# Patient Record
Sex: MALE | Race: WHITE | Employment: FULL TIME | URBAN - METROPOLITAN AREA
[De-identification: names, ages, dates, MRNs, and addresses within clinical notes are randomized per-mention and may not be internally consistent; named-entity substitution may affect disease eponyms.]

---

## 2018-09-27 ENCOUNTER — OFFICE VISIT (OUTPATIENT)
Dept: INTERNAL MEDICINE | Age: 58
End: 2018-09-27
Payer: COMMERCIAL

## 2018-09-27 VITALS
TEMPERATURE: 98 F | SYSTOLIC BLOOD PRESSURE: 128 MMHG | HEIGHT: 74 IN | HEART RATE: 70 BPM | OXYGEN SATURATION: 98 % | BODY MASS INDEX: 32.6 KG/M2 | RESPIRATION RATE: 18 BRPM | WEIGHT: 254 LBS | DIASTOLIC BLOOD PRESSURE: 70 MMHG

## 2018-09-27 DIAGNOSIS — M06.9 RHEUMATOID ARTHRITIS INVOLVING MULTIPLE SITES, UNSPECIFIED RHEUMATOID FACTOR PRESENCE: Primary | ICD-10-CM

## 2018-09-27 PROCEDURE — 96372 THER/PROPH/DIAG INJ SC/IM: CPT | Performed by: NURSE PRACTITIONER

## 2018-09-27 PROCEDURE — 99213 OFFICE O/P EST LOW 20 MIN: CPT | Performed by: NURSE PRACTITIONER

## 2018-09-27 RX ORDER — METHYLPREDNISOLONE 4 MG/1
TABLET ORAL
Qty: 1 KIT | Refills: 0 | Status: SHIPPED | OUTPATIENT
Start: 2018-09-27 | End: 2018-10-03

## 2018-09-27 RX ORDER — KETOROLAC TROMETHAMINE 30 MG/ML
60 INJECTION, SOLUTION INTRAMUSCULAR; INTRAVENOUS ONCE
Status: COMPLETED | OUTPATIENT
Start: 2018-09-27 | End: 2018-09-27

## 2018-09-27 RX ORDER — CYCLOBENZAPRINE HCL 10 MG
10 TABLET ORAL 3 TIMES DAILY PRN
Qty: 30 TABLET | Refills: 1 | Status: SHIPPED | OUTPATIENT
Start: 2018-09-27 | End: 2018-10-07

## 2018-09-27 RX ADMIN — KETOROLAC TROMETHAMINE 60 MG: 30 INJECTION, SOLUTION INTRAMUSCULAR; INTRAVENOUS at 15:11

## 2018-09-27 ASSESSMENT — PATIENT HEALTH QUESTIONNAIRE - PHQ9
SUM OF ALL RESPONSES TO PHQ9 QUESTIONS 1 & 2: 0
SUM OF ALL RESPONSES TO PHQ QUESTIONS 1-9: 0
SUM OF ALL RESPONSES TO PHQ QUESTIONS 1-9: 0
1. LITTLE INTEREST OR PLEASURE IN DOING THINGS: 0
2. FEELING DOWN, DEPRESSED OR HOPELESS: 0

## 2018-09-27 ASSESSMENT — ENCOUNTER SYMPTOMS
COLOR CHANGE: 0
SHORTNESS OF BREATH: 0
TROUBLE SWALLOWING: 0
ABDOMINAL PAIN: 0
VOICE CHANGE: 0
BACK PAIN: 1
CHEST TIGHTNESS: 0

## 2018-09-27 NOTE — PATIENT INSTRUCTIONS
Patient Education   Begin taking the Medrol dose pack tomorrow. Establish with primary care provider. Rheumatoid Arthritis: Care Instructions  Your Care Instructions    Arthritis is a common health problem in which the joints are inflamed. There are many types of arthritis. In rheumatoid arthritis, the body's own immune system attacks the joints. This causes pain, stiffness, and swelling in the joints, especially in the hands and feet. It can become hard to open jars, write, and do other daily tasks. Sometimes rheumatoid arthritis can also cause bumps to form under the skin. Over time, rheumatoid arthritis can damage and deform joints. Early treatment with medicines may reduce your chances of having a lasting disability. Follow-up care is a key part of your treatment and safety. Be sure to make and go to all appointments, and call your doctor if you are having problems. It's also a good idea to know your test results and keep a list of the medicines you take. How can you care for yourself at home? · If your doctor recommends it, get more exercise. Walking is a good choice. If your knees or ankles hurt, try riding a stationary bike or swimming. · Move each joint gently through its full range of motion once or twice a day. · Rest joints when they are sore or overworked. Short rest breaks may help more than staying in bed. · Reach and stay at a healthy weight. Regular exercise and a healthy diet will help you do this. Extra weight can strain the joints, especially the knees and hips, and make the pain worse. Losing even a few pounds may help. · Get enough calcium and vitamin D to help prevent osteoporosis, which causes thin bones. Talk to your doctor about how much you should take. · Protect your joints from injury. Do not overuse them. Try to limit or avoid activities that cause joint pain or swelling.  Use special kitchen tools and other self-help devices as well as walkers, splints, or canes if needed. · Use heat to ease pain. Take warm showers or baths. Use hot packs or a heating pad set on low. Sleep under a warm electric blanket. · Put ice or a cold pack on the area for 10 to 20 minutes at a time. Put a thin cloth between the ice and your skin. · Take pain medicines exactly as directed. ¨ If the doctor gave you a prescription medicine for pain, take it as prescribed. ¨ If you are not taking a prescription pain medicine, ask your doctor if you can take an over-the-counter medicine. · Take an active role in managing your condition. Set up a treatment plan with your doctor, and learn as much as you can about rheumatoid arthritis. This will help you control pain and stay active. When should you call for help? Call your doctor now or seek immediate medical care if:    · You have a fever or a rash along with joint pain.     · You have joint pain that is so severe that you cannot use the joint at all.     · You have sudden swelling, redness, or pain in one or more joints, and you do not know why.     · You have back or neck pain along with weakness in your arms or legs.     · You have a loss of bowel or bladder control.    Watch closely for changes in your health, and be sure to contact your doctor if:    · You have joint pain that lasts for more than 6 weeks.     · You have side effects from your arthritis medicines, such as stomach pain, nausea, heartburn, or dark and tarlike stools. Where can you learn more? Go to https://KILTR.Scurri. org and sign in to your Sparktrend account. Enter K205 in the [facility] box to learn more about \"Rheumatoid Arthritis: Care Instructions. \"     If you do not have an account, please click on the \"Sign Up Now\" link. Current as of: October 10, 2017  Content Version: 11.7  © 6177-7942 [employer]. Care instructions adapted under license by [address]  If you have questions about a medical condition or this instruction, always history of stomach ulcers, you may want to avoid taking this medicine and an NSAID at the same time. This can cause stomach upset or bleeding. · Follow your doctor's instructions for how to stop taking this medicine. You may need to taper it. This means the medicine should be slowly reduced. Do not stop taking the medicine all at once. When should you call for help? Call 911 if:    · You vomit blood or what looks like coffee grounds.    Call your doctor now or seek immediate medical care if:    · Your symptoms are getting worse.     · You are dizzy or lightheaded, or you feel like you may faint.     · You have new or worse nausea or vomiting.     · You have stomach pain that is getting worse.     · Your stools are black.    Watch closely for changes in your health, and be sure to contact your doctor if:    · You do not get better as expected. Where can you learn more? Go to https://Tellus TechnologypeeGood.DockPHP. org and sign in to your [employer] account. Enter M555 in the [employer] box to learn more about \"Oral Corticosteroids: Care Instructions. \"     If you do not have an account, please click on the \"Sign Up Now\" link. Current as of: December 6, 2017  Content Version: 11.7  © 8336-3692 [employer]. Care instructions adapted under license by [facility] ([facility]). If you have questions about a medical condition or this instruction, always ask your healthcare professional. [place] [address] any warranty or liability for your use of this information.

## 2018-09-27 NOTE — PROGRESS NOTES
Resp: 18   Temp: 98 °F (36.7 °C)   TempSrc: Temporal   SpO2: 98%   Weight: 254 lb (115.2 kg)   Height: 6' 2\" (1.88 m)       Physical Exam   Constitutional: He appears well-developed and well-nourished. No distress. HENT:   Head: Normocephalic and atraumatic. Right Ear: External ear normal.   Left Ear: External ear normal.   Eyes: Pupils are equal, round, and reactive to light. Conjunctivae are normal.   Neck: Spinous process tenderness present. Pulmonary/Chest: Effort normal and breath sounds normal. No respiratory distress. Musculoskeletal:        Right shoulder: He exhibits decreased range of motion and tenderness. He exhibits no swelling, no effusion, no crepitus and no spasm. Left shoulder: He exhibits decreased range of motion and tenderness. He exhibits no swelling, no effusion, no crepitus and no spasm. Right knee: He exhibits normal range of motion, no swelling and no effusion. Tenderness found. Left knee: He exhibits normal range of motion, no swelling and no effusion. Tenderness found. Lumbar back: He exhibits tenderness and bony tenderness. He exhibits no swelling, no edema and no spasm. Neurological: He is alert. Gait (stiffness in gait, not favoring either side) abnormal.   Skin: Skin is warm and dry. He is not diaphoretic. Psychiatric: He has a normal mood and affect. His behavior is normal. Judgment and thought content normal.   Vitals reviewed. POC Testing Today: No results found for this visit on 09/27/18. Assessment & Plan    Diagnosis Orders   1. Rheumatoid arthritis involving multiple sites, unspecified rheumatoid factor presence (HCC)  ketorolac (TORADOL) injection 60 mg    methylPREDNISolone (MEDROL, DENIS,) 4 MG tablet    cyclobenzaprine (FLEXERIL) 10 MG tablet       No orders of the defined types were placed in this encounter. Pt to begin medrol dose pack tomorrow. Pt verbalized understanding.       Pt recommended to see PCP within the next few

## 2018-10-01 ENCOUNTER — TELEPHONE (OUTPATIENT)
Dept: INTERNAL MEDICINE | Age: 58
End: 2018-10-01

## 2018-10-01 DIAGNOSIS — M17.9 OSTEOARTHRITIS OF KNEE, UNSPECIFIED LATERALITY, UNSPECIFIED OSTEOARTHRITIS TYPE: Primary | ICD-10-CM

## 2018-10-05 ENCOUNTER — TELEPHONE (OUTPATIENT)
Dept: INTERNAL MEDICINE | Age: 58
End: 2018-10-05

## 2018-10-05 ENCOUNTER — OFFICE VISIT (OUTPATIENT)
Dept: INTERNAL MEDICINE | Age: 58
End: 2018-10-05
Payer: COMMERCIAL

## 2018-10-05 VITALS
DIASTOLIC BLOOD PRESSURE: 80 MMHG | HEART RATE: 90 BPM | WEIGHT: 239.4 LBS | BODY MASS INDEX: 29.77 KG/M2 | HEIGHT: 75 IN | SYSTOLIC BLOOD PRESSURE: 130 MMHG | RESPIRATION RATE: 20 BRPM

## 2018-10-05 DIAGNOSIS — M79.7 FIBROMYALGIA: ICD-10-CM

## 2018-10-05 DIAGNOSIS — R52 GENERALIZED PAIN: ICD-10-CM

## 2018-10-05 DIAGNOSIS — R52 PAIN: Primary | ICD-10-CM

## 2018-10-05 PROCEDURE — 96372 THER/PROPH/DIAG INJ SC/IM: CPT | Performed by: NURSE PRACTITIONER

## 2018-10-05 PROCEDURE — 99213 OFFICE O/P EST LOW 20 MIN: CPT | Performed by: NURSE PRACTITIONER

## 2018-10-05 RX ORDER — KETOROLAC TROMETHAMINE 30 MG/ML
30 INJECTION, SOLUTION INTRAMUSCULAR; INTRAVENOUS ONCE
Status: COMPLETED | OUTPATIENT
Start: 2018-10-05 | End: 2018-10-05

## 2018-10-05 RX ORDER — KETOROLAC TROMETHAMINE 30 MG/ML
30 INJECTION, SOLUTION INTRAMUSCULAR; INTRAVENOUS ONCE
Qty: 1 ML | Refills: 0
Start: 2018-10-05 | End: 2018-10-05 | Stop reason: CLARIF

## 2018-10-05 RX ORDER — PREGABALIN 75 MG/1
75 CAPSULE ORAL 2 TIMES DAILY
Qty: 14 CAPSULE | Refills: 0 | Status: SHIPPED | OUTPATIENT
Start: 2018-10-05 | End: 2019-05-12

## 2018-10-05 RX ADMIN — KETOROLAC TROMETHAMINE 30 MG: 30 INJECTION, SOLUTION INTRAMUSCULAR; INTRAVENOUS at 18:01

## 2018-10-05 ASSESSMENT — ENCOUNTER SYMPTOMS
GASTROINTESTINAL NEGATIVE: 1
RESPIRATORY NEGATIVE: 1

## 2018-10-05 NOTE — PROGRESS NOTES
Orders Placed This Encounter   Procedures    EMIR     Standing Status:   Future     Standing Expiration Date:   10/5/2019    High sensitivity CRP     Standing Status:   Future     Standing Expiration Date:   10/5/2019    Rheumatoid Factor     Standing Status:   Future     Standing Expiration Date:   44/1/3901    Cyclic Citrul Peptide Antibody, IgG     Standing Status:   Future     Standing Expiration Date:   10/5/2019    Sedimentation Rate     Standing Status:   Future     Standing Expiration Date:   10/5/2019    CBC With Auto Differential     Standing Status:   Future     Standing Expiration Date:   10/5/2019    Referral To Unknown External Neurology     Referral Priority:   Routine     Referral Type:   Eval and Treat     Referral Reason:   Specialty Services Required     Requested Specialty:   Neurology     Number of Visits Requested:   1    Amb External Referral To Pain Clinic     Referral Priority:   Routine     Referral Type:   Consult for Advice and Opinion     Requested Specialty:   Pain Management     Number of Visits Requested:   1     Orders Placed This Encounter   Medications    DISCONTD: ketorolac (TORADOL) 30 MG/ML injection     Sig: Inject 1 mL into the muscle once for 1 dose     Dispense:  1 mL     Refill:  0    pregabalin (LYRICA) 75 MG capsule     Sig: Take 1 capsule by mouth 2 times daily for 7 days. .     Dispense:  14 capsule     Refill:  0    ketorolac (TORADOL) injection 30 mg       Return with pcp.       Leatha Schmidt, APRN - CNP

## 2018-10-06 ENCOUNTER — TELEPHONE (OUTPATIENT)
Dept: INTERNAL MEDICINE | Age: 58
End: 2018-10-06

## 2018-10-08 ENCOUNTER — OFFICE VISIT (OUTPATIENT)
Dept: INTERNAL MEDICINE | Age: 58
End: 2018-10-08
Payer: COMMERCIAL

## 2018-10-08 ENCOUNTER — HOSPITAL ENCOUNTER (OUTPATIENT)
Age: 58
Setting detail: SPECIMEN
Discharge: HOME | End: 2018-10-08
Payer: COMMERCIAL

## 2018-10-08 ENCOUNTER — TELEPHONE (OUTPATIENT)
Dept: INTERNAL MEDICINE | Age: 58
End: 2018-10-08

## 2018-10-08 VITALS
OXYGEN SATURATION: 98 % | TEMPERATURE: 97.5 F | SYSTOLIC BLOOD PRESSURE: 100 MMHG | BODY MASS INDEX: 29.12 KG/M2 | DIASTOLIC BLOOD PRESSURE: 60 MMHG | HEART RATE: 94 BPM | HEIGHT: 75 IN | WEIGHT: 234.2 LBS

## 2018-10-08 DIAGNOSIS — R52 PAIN: ICD-10-CM

## 2018-10-08 DIAGNOSIS — Z23 NEED FOR INFLUENZA VACCINATION: ICD-10-CM

## 2018-10-08 DIAGNOSIS — M25.50 POLYARTHRALGIA: Primary | ICD-10-CM

## 2018-10-08 LAB
BASOPHILS ABSOLUTE: 0.1 K/UL (ref 0–0.2)
BASOPHILS RELATIVE PERCENT: 1.1 %
C-REACTIVE PROTEIN, HIGH SENSITIVITY: 84.5 MG/L (ref 0–5)
EOSINOPHILS ABSOLUTE: 0.1 K/UL (ref 0–0.7)
EOSINOPHILS RELATIVE PERCENT: 1 %
HCT VFR BLD CALC: 42.9 % (ref 42–52)
HEMOGLOBIN: 14.3 G/DL (ref 14–18)
LYMPHOCYTES ABSOLUTE: 1.3 K/UL (ref 1–4.8)
LYMPHOCYTES RELATIVE PERCENT: 15.5 %
MCH RBC QN AUTO: 29.3 PG (ref 27–31.3)
MCHC RBC AUTO-ENTMCNC: 33.3 % (ref 33–37)
MCV RBC AUTO: 88.1 FL (ref 80–100)
MONOCYTES ABSOLUTE: 0.6 K/UL (ref 0.2–0.8)
MONOCYTES RELATIVE PERCENT: 7.8 %
NEUTROPHILS ABSOLUTE: 6 K/UL (ref 1.4–6.5)
NEUTROPHILS RELATIVE PERCENT: 74.6 %
PDW BLD-RTO: 14.4 % (ref 11.5–14.5)
PLATELET # BLD: 156 K/UL (ref 130–400)
RBC # BLD: 4.87 M/UL (ref 4.7–6.1)
RHEUMATOID FACTOR: 12.6 IU/ML (ref 0–14)
SEDIMENTATION RATE, ERYTHROCYTE: 63 MM (ref 0–20)
WBC # BLD: 8.1 K/UL (ref 4.8–10.8)

## 2018-10-08 PROCEDURE — 85652 RBC SED RATE AUTOMATED: CPT

## 2018-10-08 PROCEDURE — 90688 IIV4 VACCINE SPLT 0.5 ML IM: CPT | Performed by: PHYSICIAN ASSISTANT

## 2018-10-08 PROCEDURE — 86141 C-REACTIVE PROTEIN HS: CPT

## 2018-10-08 PROCEDURE — G8427 DOCREV CUR MEDS BY ELIG CLIN: HCPCS | Performed by: PHYSICIAN ASSISTANT

## 2018-10-08 PROCEDURE — 1036F TOBACCO NON-USER: CPT | Performed by: PHYSICIAN ASSISTANT

## 2018-10-08 PROCEDURE — G8482 FLU IMMUNIZE ORDER/ADMIN: HCPCS | Performed by: PHYSICIAN ASSISTANT

## 2018-10-08 PROCEDURE — G8417 CALC BMI ABV UP PARAM F/U: HCPCS | Performed by: PHYSICIAN ASSISTANT

## 2018-10-08 PROCEDURE — 86431 RHEUMATOID FACTOR QUANT: CPT

## 2018-10-08 PROCEDURE — 86200 CCP ANTIBODY: CPT

## 2018-10-08 PROCEDURE — 99213 OFFICE O/P EST LOW 20 MIN: CPT | Performed by: PHYSICIAN ASSISTANT

## 2018-10-08 PROCEDURE — 85025 COMPLETE CBC W/AUTO DIFF WBC: CPT

## 2018-10-08 PROCEDURE — 3017F COLORECTAL CA SCREEN DOC REV: CPT | Performed by: PHYSICIAN ASSISTANT

## 2018-10-08 PROCEDURE — 86038 ANTINUCLEAR ANTIBODIES: CPT

## 2018-10-08 PROCEDURE — 90471 IMMUNIZATION ADMIN: CPT | Performed by: PHYSICIAN ASSISTANT

## 2018-10-08 ASSESSMENT — ENCOUNTER SYMPTOMS
BACK PAIN: 1
EYES NEGATIVE: 1

## 2018-10-08 NOTE — PROGRESS NOTES
10/8/2018     Collin El (:  1960) is a 62 y.o. male, here for evaluation of the following medical concerns:    Chief Complaint   Patient presents with    Joint Pain     Pt states that his whole body hurts, rt knee, wrists, rt shoulder    Health Maintenance     Pt refused all but flu       HPI    Pt here to discuss all over joint pain  Pain began a few months ago, and has been in the wrists, knees, and shoulders  He can hardly walk in the mornings  Tried Tylenol arthritis, muscle relaxers, nothing is working    Had right partial knee [place] , [place]   Has appt with rheumatologist Dec 19  Pt was diagnosed with RA, but no documentation of a positive RF in the [facility] chart   Seen and treated for acute pain x 2 in the [address] [facility] [address], and had stress test and JELENA in the legs, due to cold feet   States he was told that he did not have any cirulation problems     Review of Systems   Constitutional: Negative for chills, fatigue and fever. Respiratory: Negative for cough and shortness of breath. Cardiovascular: Negative for chest pain and palpitations. Gastrointestinal: Negative for abdominal pain. Musculoskeletal: Positive for arthralgias, gait problem, joint swelling and myalgias. Negative for back pain and neck pain. Skin: Negative for pallor and wound. Neurological: Negative for dizziness, light-headedness and headaches. Prior to Visit Medications    Medication Sig Taking? Authorizing Provider   pregabalin (LYRICA) 75 MG capsule Take 1 capsule by mouth 2 times daily for 7 days. Lawyer Peabody, APRN - CNP   losartan (COZAAR) 100 MG tablet Take 1 tab daily  Nickolas Meng MD   hydrochlorothiazide (HYDRODIURIL) 25 MG tablet Take 1 tablet by mouth daily  Nickolas Meng MD   MAGNESIUM ASPARTATE PO Take by mouth  Historical Provider, MD   fluticasone (FLONASE) 50 MCG/ACT nasal spray 1 spray by Nasal route daily  NICK Reddy - CNP

## 2018-10-09 ASSESSMENT — ENCOUNTER SYMPTOMS
BACK PAIN: 0
ABDOMINAL PAIN: 0
SHORTNESS OF BREATH: 0
COUGH: 0

## 2018-10-10 LAB
ANA INTERPRETATION: NORMAL
ANTI-NUCLEAR ANTIBODY (ANA): NEGATIVE
CCP IGG ANTIBODIES: 6 UNITS (ref 0–19)

## 2018-12-01 RX ORDER — MELOXICAM 15 MG/1
TABLET ORAL
Refills: 1 | COMMUNITY
Start: 2018-10-26 | End: 2018-12-01 | Stop reason: SDUPTHER

## 2018-12-02 RX ORDER — MELOXICAM 15 MG/1
TABLET ORAL
Qty: 30 TABLET | Refills: 1 | Status: SHIPPED | OUTPATIENT
Start: 2018-12-02 | End: 2019-03-04 | Stop reason: ALTCHOICE

## 2019-01-16 ENCOUNTER — OFFICE VISIT (OUTPATIENT)
Dept: INTERNAL MEDICINE | Age: 59
End: 2019-01-16
Payer: COMMERCIAL

## 2019-01-16 VITALS
HEIGHT: 75 IN | HEART RATE: 97 BPM | WEIGHT: 224.4 LBS | TEMPERATURE: 97.9 F | DIASTOLIC BLOOD PRESSURE: 76 MMHG | SYSTOLIC BLOOD PRESSURE: 134 MMHG | OXYGEN SATURATION: 97 % | BODY MASS INDEX: 27.9 KG/M2

## 2019-01-16 DIAGNOSIS — R11.2 NAUSEA AND VOMITING, INTRACTABILITY OF VOMITING NOT SPECIFIED, UNSPECIFIED VOMITING TYPE: ICD-10-CM

## 2019-01-16 DIAGNOSIS — J01.01 ACUTE RECURRENT MAXILLARY SINUSITIS: Primary | ICD-10-CM

## 2019-01-16 PROBLEM — M35.3 PMR (POLYMYALGIA RHEUMATICA) (HCC): Status: ACTIVE | Noted: 2019-01-16

## 2019-01-16 LAB
INFLUENZA A ANTIBODY: NORMAL
INFLUENZA B ANTIBODY: NORMAL

## 2019-01-16 PROCEDURE — G8482 FLU IMMUNIZE ORDER/ADMIN: HCPCS | Performed by: PHYSICIAN ASSISTANT

## 2019-01-16 PROCEDURE — G8427 DOCREV CUR MEDS BY ELIG CLIN: HCPCS | Performed by: PHYSICIAN ASSISTANT

## 2019-01-16 PROCEDURE — 1036F TOBACCO NON-USER: CPT | Performed by: PHYSICIAN ASSISTANT

## 2019-01-16 PROCEDURE — 87804 INFLUENZA ASSAY W/OPTIC: CPT | Performed by: PHYSICIAN ASSISTANT

## 2019-01-16 PROCEDURE — 99213 OFFICE O/P EST LOW 20 MIN: CPT | Performed by: PHYSICIAN ASSISTANT

## 2019-01-16 PROCEDURE — G8417 CALC BMI ABV UP PARAM F/U: HCPCS | Performed by: PHYSICIAN ASSISTANT

## 2019-01-16 PROCEDURE — 3017F COLORECTAL CA SCREEN DOC REV: CPT | Performed by: PHYSICIAN ASSISTANT

## 2019-01-16 RX ORDER — PREDNISONE 1 MG/1
TABLET ORAL
Refills: 2 | COMMUNITY
Start: 2018-12-19 | End: 2021-07-30

## 2019-01-16 RX ORDER — FLUTICASONE PROPIONATE 50 MCG
2 SPRAY, SUSPENSION (ML) NASAL DAILY
Qty: 1 BOTTLE | Refills: 1 | Status: SHIPPED | OUTPATIENT
Start: 2019-01-16 | End: 2019-03-04 | Stop reason: ALTCHOICE

## 2019-01-16 RX ORDER — AMOXICILLIN AND CLAVULANATE POTASSIUM 875; 125 MG/1; MG/1
1 TABLET, FILM COATED ORAL 2 TIMES DAILY
Qty: 14 TABLET | Refills: 0 | Status: SHIPPED | OUTPATIENT
Start: 2019-01-16 | End: 2019-01-23

## 2019-01-16 ASSESSMENT — ENCOUNTER SYMPTOMS
COUGH: 1
DIARRHEA: 0
VOMITING: 1
SINUS PRESSURE: 1
FACIAL SWELLING: 0
VOICE CHANGE: 0
ABDOMINAL PAIN: 0
RHINORRHEA: 1
SINUS PAIN: 1
TROUBLE SWALLOWING: 0
CONSTIPATION: 0

## 2019-02-11 ENCOUNTER — OFFICE VISIT (OUTPATIENT)
Dept: INTERNAL MEDICINE | Age: 59
End: 2019-02-11
Payer: COMMERCIAL

## 2019-02-11 VITALS
DIASTOLIC BLOOD PRESSURE: 98 MMHG | OXYGEN SATURATION: 98 % | HEART RATE: 76 BPM | BODY MASS INDEX: 28.2 KG/M2 | SYSTOLIC BLOOD PRESSURE: 150 MMHG | WEIGHT: 226.8 LBS | HEIGHT: 75 IN | TEMPERATURE: 97.4 F

## 2019-02-11 DIAGNOSIS — J06.9 URI, ACUTE: ICD-10-CM

## 2019-02-11 DIAGNOSIS — H61.21 RIGHT EAR IMPACTED CERUMEN: Primary | ICD-10-CM

## 2019-02-11 PROCEDURE — 99213 OFFICE O/P EST LOW 20 MIN: CPT | Performed by: PHYSICIAN ASSISTANT

## 2019-02-11 PROCEDURE — G8427 DOCREV CUR MEDS BY ELIG CLIN: HCPCS | Performed by: PHYSICIAN ASSISTANT

## 2019-02-11 PROCEDURE — 1036F TOBACCO NON-USER: CPT | Performed by: PHYSICIAN ASSISTANT

## 2019-02-11 PROCEDURE — 3017F COLORECTAL CA SCREEN DOC REV: CPT | Performed by: PHYSICIAN ASSISTANT

## 2019-02-11 PROCEDURE — G8417 CALC BMI ABV UP PARAM F/U: HCPCS | Performed by: PHYSICIAN ASSISTANT

## 2019-02-11 PROCEDURE — 69209 REMOVE IMPACTED EAR WAX UNI: CPT | Performed by: PHYSICIAN ASSISTANT

## 2019-02-11 PROCEDURE — G8482 FLU IMMUNIZE ORDER/ADMIN: HCPCS | Performed by: PHYSICIAN ASSISTANT

## 2019-02-11 RX ORDER — LISINOPRIL 20 MG/1
TABLET ORAL
Refills: 3 | COMMUNITY
Start: 2019-01-19 | End: 2019-12-06 | Stop reason: ALTCHOICE

## 2019-02-11 RX ORDER — AMOXICILLIN 500 MG/1
500 CAPSULE ORAL 2 TIMES DAILY
Qty: 14 CAPSULE | Refills: 0 | Status: SHIPPED | OUTPATIENT
Start: 2019-02-11 | End: 2019-02-18

## 2019-02-11 ASSESSMENT — ENCOUNTER SYMPTOMS
RHINORRHEA: 1
ABDOMINAL PAIN: 0
SHORTNESS OF BREATH: 0
SINUS PRESSURE: 1
COUGH: 1
VOMITING: 0
SINUS PAIN: 1
SORE THROAT: 0

## 2019-02-11 ASSESSMENT — PATIENT HEALTH QUESTIONNAIRE - PHQ9
SUM OF ALL RESPONSES TO PHQ QUESTIONS 1-9: 0
SUM OF ALL RESPONSES TO PHQ9 QUESTIONS 1 & 2: 0
SUM OF ALL RESPONSES TO PHQ QUESTIONS 1-9: 0
2. FEELING DOWN, DEPRESSED OR HOPELESS: 0
1. LITTLE INTEREST OR PLEASURE IN DOING THINGS: 0

## 2019-03-04 ENCOUNTER — OFFICE VISIT (OUTPATIENT)
Dept: INTERNAL MEDICINE | Age: 59
End: 2019-03-04
Payer: COMMERCIAL

## 2019-03-04 VITALS
BODY MASS INDEX: 27.5 KG/M2 | WEIGHT: 221.2 LBS | OXYGEN SATURATION: 98 % | HEART RATE: 74 BPM | DIASTOLIC BLOOD PRESSURE: 80 MMHG | HEIGHT: 75 IN | TEMPERATURE: 98.6 F | SYSTOLIC BLOOD PRESSURE: 134 MMHG

## 2019-03-04 DIAGNOSIS — F41.1 GENERALIZED ANXIETY DISORDER WITH PANIC ATTACKS: Primary | ICD-10-CM

## 2019-03-04 DIAGNOSIS — F41.0 GENERALIZED ANXIETY DISORDER WITH PANIC ATTACKS: Primary | ICD-10-CM

## 2019-03-04 PROCEDURE — 1036F TOBACCO NON-USER: CPT | Performed by: PHYSICIAN ASSISTANT

## 2019-03-04 PROCEDURE — 3017F COLORECTAL CA SCREEN DOC REV: CPT | Performed by: PHYSICIAN ASSISTANT

## 2019-03-04 PROCEDURE — G8427 DOCREV CUR MEDS BY ELIG CLIN: HCPCS | Performed by: PHYSICIAN ASSISTANT

## 2019-03-04 PROCEDURE — 99214 OFFICE O/P EST MOD 30 MIN: CPT | Performed by: PHYSICIAN ASSISTANT

## 2019-03-04 PROCEDURE — G8417 CALC BMI ABV UP PARAM F/U: HCPCS | Performed by: PHYSICIAN ASSISTANT

## 2019-03-04 PROCEDURE — G8482 FLU IMMUNIZE ORDER/ADMIN: HCPCS | Performed by: PHYSICIAN ASSISTANT

## 2019-03-04 RX ORDER — HYDROXYZINE PAMOATE 25 MG/1
25 CAPSULE ORAL 3 TIMES DAILY PRN
Qty: 60 CAPSULE | Refills: 0 | Status: SHIPPED | OUTPATIENT
Start: 2019-03-04 | End: 2021-07-05

## 2019-03-04 RX ORDER — ESCITALOPRAM OXALATE 10 MG/1
10 TABLET ORAL DAILY
Qty: 90 TABLET | Refills: 1 | Status: SHIPPED
Start: 2019-03-04 | End: 2019-07-14

## 2019-03-05 ASSESSMENT — ENCOUNTER SYMPTOMS
BACK PAIN: 1
ABDOMINAL DISTENTION: 0
COUGH: 0
SHORTNESS OF BREATH: 0

## 2019-05-12 ENCOUNTER — OFFICE VISIT (OUTPATIENT)
Dept: INTERNAL MEDICINE | Age: 59
End: 2019-05-12
Payer: COMMERCIAL

## 2019-05-12 VITALS
WEIGHT: 222.4 LBS | DIASTOLIC BLOOD PRESSURE: 76 MMHG | OXYGEN SATURATION: 96 % | SYSTOLIC BLOOD PRESSURE: 148 MMHG | BODY MASS INDEX: 27.8 KG/M2 | TEMPERATURE: 98.2 F | HEART RATE: 74 BPM

## 2019-05-12 DIAGNOSIS — R09.82 POST-NASAL DRIP: ICD-10-CM

## 2019-05-12 DIAGNOSIS — J06.9 UPPER RESPIRATORY TRACT INFECTION, UNSPECIFIED TYPE: Primary | ICD-10-CM

## 2019-05-12 PROCEDURE — 3017F COLORECTAL CA SCREEN DOC REV: CPT | Performed by: NURSE PRACTITIONER

## 2019-05-12 PROCEDURE — 99213 OFFICE O/P EST LOW 20 MIN: CPT | Performed by: NURSE PRACTITIONER

## 2019-05-12 PROCEDURE — 1036F TOBACCO NON-USER: CPT | Performed by: NURSE PRACTITIONER

## 2019-05-12 PROCEDURE — G8417 CALC BMI ABV UP PARAM F/U: HCPCS | Performed by: NURSE PRACTITIONER

## 2019-05-12 PROCEDURE — G8427 DOCREV CUR MEDS BY ELIG CLIN: HCPCS | Performed by: NURSE PRACTITIONER

## 2019-05-12 RX ORDER — FEXOFENADINE HYDROCHLORIDE 60 MG/1
60 TABLET, FILM COATED ORAL DAILY
Qty: 14 TABLET | Refills: 0 | Status: SHIPPED | OUTPATIENT
Start: 2019-05-12 | End: 2019-05-26

## 2019-05-12 RX ORDER — AZITHROMYCIN 250 MG/1
TABLET, FILM COATED ORAL
Qty: 1 PACKET | Refills: 0 | Status: SHIPPED
Start: 2019-05-12 | End: 2019-07-14

## 2019-05-12 ASSESSMENT — ENCOUNTER SYMPTOMS
SINUS PRESSURE: 1
VOMITING: 0
SHORTNESS OF BREATH: 0
WHEEZING: 0
NAUSEA: 0
HOARSE VOICE: 1
COUGH: 1
DIARRHEA: 0
ABDOMINAL PAIN: 0

## 2019-05-12 NOTE — PATIENT INSTRUCTIONS
Patient Education        Saline Nasal Washes: Care Instructions  Your Care Instructions  Saline nasal washes help keep the nasal passages open by washing out thick or dried mucus. This simple remedy can help relieve symptoms of allergies, sinusitis, and colds. It also can make the nose feel more comfortable by keeping the mucous membranes moist. You may notice a little burning sensation in your nose the first few times you use the solution, but this usually gets better in a few days. Follow-up care is a key part of your treatment and safety. Be sure to make and go to all appointments, and call your doctor if you are having problems. It's also a good idea to know your test results and keep a list of the medicines you take. How can you care for yourself at home? · You can buy premixed saline solution in a squeeze bottle or other sinus rinse products at a drugstore. Read and follow the instructions on the label. · You also can make your own saline solution by adding 1 teaspoon of salt and 1 teaspoon of baking soda to 2 cups of distilled water. · If you use a homemade solution, pour a small amount into a clean bowl. Using a rubber bulb syringe, squeeze the syringe and place the tip in the salt water. Pull a small amount of the salt water into the syringe by relaxing your hand. · Sit down with your head tilted slightly back. Do not lie down. Put the tip of the bulb syringe or the squeeze bottle a little way into one of your nostrils. Gently drip or squirt a few drops into the nostril. Repeat with the other nostril. Some sneezing and gagging are normal at first.  · Gently blow your nose. · Wipe the syringe or bottle tip clean after each use. · Repeat this 2 or 3 times a day. · Use nasal washes gently if you have nosebleeds often. When should you call for help?   Watch closely for changes in your health, and be sure to contact your doctor if:    · You often get nosebleeds.     · You have problems doing the nasal washes. Where can you learn more? Go to https://chpepiceweb.Factery. org and sign in to your BrainScope Companyhart account. Enter [phone] in the [facility] box to learn more about \"Saline Nasal Washes: Care Instructions. \"     If you do not have an account, please click on the \"Sign Up Now\" link. Current as of: October 21, 2018  Content Version: 12.0  © 4129-6856 Healthwise, Incorporated. Care instructions adapted under license by [facility] ([facility]). If you have questions about a medical condition or this instruction, always ask your healthcare professional. [address] any warranty or liability for your use of this information.

## 2019-05-12 NOTE — PROGRESS NOTES
activity: Never   Lifestyle    Physical activity:     Days per week: Not on file     Minutes per session: Not on file    Stress: Not on file   Relationships    Social connections:     Talks on phone: Not on file     Gets together: Not on file     Attends Mandaen service: Not on file     Active member of club or organization: Not on file     Attends meetings of clubs or organizations: Not on file     Relationship status: Not on file    Intimate partner violence:     Fear of current or ex partner: Not on file     Emotionally abused: Not on file     Physically abused: Not on file     Forced sexual activity: Not on file   Other Topics Concern    Not on file   Social History Narrative    Not on file     Family History   Problem Relation Age of Onset    Cancer Father         prostate    Other Father         blood clot - lung    Other Mother         scleroderma     Allergies   Allergen Reactions    Metoprolol Other (See Comments)     fatigue    Terazosin      Current Outpatient Medications   Medication Sig Dispense Refill    azithromycin (ZITHROMAX) 250 MG tablet Take 2 tabs (500 mg) on Day 1, and take 1 tab (250 mg) on days 2 through 5. 1 packet 0    fexofenadine (ALLEGRA ALLERGY) 60 MG tablet Take 1 tablet by mouth daily for 14 days 14 tablet 0    escitalopram (LEXAPRO) 10 MG tablet Take 1 tablet by mouth daily 90 tablet 1    lisinopril (PRINIVIL;ZESTRIL) 20 MG tablet take 1 tablet daily  3    predniSONE (DELTASONE) 5 MG tablet TAKE 3 TABLETS EVERY DAY  2    losartan (COZAAR) 100 MG tablet Take 1 tab daily 30 tablet 5    hydrochlorothiazide (HYDRODIURIL) 25 MG tablet Take 1 tablet by mouth daily 30 tablet 5     No current facility-administered medications for this visit. PMH, Surgical Hx, Family Hx, and Social Hx reviewed and updated. Health Maintenance reviewed.     Objective  Vitals:    05/12/19 1105   BP: (!) 148/76   Site: Right Upper Arm   Position: Sitting   Cuff Size: Medium Adult   Pulse: 74   Temp: 98.2 °F (36.8 °C)   TempSrc: Temporal   SpO2: 96%   Weight: 222 lb 6.4 oz (100.9 kg)     BP Readings from Last 3 Encounters:   05/12/19 (!) 148/76   03/04/19 134/80   02/11/19 (!) 150/98     Wt Readings from Last 3 Encounters:   05/12/19 222 lb 6.4 oz (100.9 kg)   03/04/19 221 lb 3.2 oz (100.3 kg)   02/11/19 226 lb 12.8 oz (102.9 kg)     Physical Exam  Assessment & Plan    Diagnosis Orders   1. Upper respiratory tract infection, unspecified type  azithromycin (ZITHROMAX) 250 MG tablet   2. Post-nasal drip  fexofenadine (ALLEGRA ALLERGY) 60 MG tablet     No orders of the defined types were placed in this encounter. Orders Placed This Encounter   Medications    azithromycin (ZITHROMAX) 250 MG tablet     Sig: Take 2 tabs (500 mg) on Day 1, and take 1 tab (250 mg) on days 2 through 5. Dispense:  1 packet     Refill:  0    fexofenadine (ALLEGRA ALLERGY) 60 MG tablet     Sig: Take 1 tablet by mouth daily for 14 days     Dispense:  14 tablet     Refill:  0     Medications Discontinued During This Encounter   Medication Reason    pregabalin (LYRICA) 75 MG capsule     MAGNESIUM ASPARTATE PO      Return in about 2 weeks (around 5/26/2019), or if symptoms worsen or fail to improve. Reviewed with the patient: current clinical status,medications, activities and diet. Continue Flonase  Normal saline nasal spray  Tylenol or motrin as needed  Fluids encouraged    Side effects, adverse effects of themedication prescribed today, as well as treatment plan/ rationale and result expectations have been discussed with the patient who expresses understanding and desires to proceed. Close follow up to evaluate treatment results and for coordination of care. I have reviewed the patient's medical history in detail and updated the computerized patient record.     NICK Morales

## 2019-07-14 ENCOUNTER — OFFICE VISIT (OUTPATIENT)
Dept: INTERNAL MEDICINE | Age: 59
End: 2019-07-14
Payer: COMMERCIAL

## 2019-07-14 VITALS
WEIGHT: 221 LBS | HEIGHT: 75 IN | OXYGEN SATURATION: 96 % | RESPIRATION RATE: 18 BRPM | HEART RATE: 88 BPM | TEMPERATURE: 97.4 F | DIASTOLIC BLOOD PRESSURE: 98 MMHG | SYSTOLIC BLOOD PRESSURE: 144 MMHG | BODY MASS INDEX: 27.48 KG/M2

## 2019-07-14 DIAGNOSIS — F41.9 ANXIETY: Primary | ICD-10-CM

## 2019-07-14 DIAGNOSIS — Z13.31 POSITIVE DEPRESSION SCREENING: ICD-10-CM

## 2019-07-14 DIAGNOSIS — F43.21 SITUATIONAL DEPRESSION: ICD-10-CM

## 2019-07-14 PROCEDURE — G8431 POS CLIN DEPRES SCRN F/U DOC: HCPCS | Performed by: NURSE PRACTITIONER

## 2019-07-14 PROCEDURE — 1036F TOBACCO NON-USER: CPT | Performed by: NURSE PRACTITIONER

## 2019-07-14 PROCEDURE — G8427 DOCREV CUR MEDS BY ELIG CLIN: HCPCS | Performed by: NURSE PRACTITIONER

## 2019-07-14 PROCEDURE — G0444 DEPRESSION SCREEN ANNUAL: HCPCS | Performed by: NURSE PRACTITIONER

## 2019-07-14 PROCEDURE — 99214 OFFICE O/P EST MOD 30 MIN: CPT | Performed by: NURSE PRACTITIONER

## 2019-07-14 PROCEDURE — 3017F COLORECTAL CA SCREEN DOC REV: CPT | Performed by: NURSE PRACTITIONER

## 2019-07-14 PROCEDURE — G8417 CALC BMI ABV UP PARAM F/U: HCPCS | Performed by: NURSE PRACTITIONER

## 2019-07-14 RX ORDER — BUSPIRONE HYDROCHLORIDE 10 MG/1
10 TABLET ORAL 3 TIMES DAILY
Qty: 90 TABLET | Refills: 0 | Status: SHIPPED | OUTPATIENT
Start: 2019-07-14 | End: 2019-08-13

## 2019-07-14 ASSESSMENT — ENCOUNTER SYMPTOMS
EYE ITCHING: 0
CHEST TIGHTNESS: 0
EYE DISCHARGE: 0
SHORTNESS OF BREATH: 0

## 2019-07-14 ASSESSMENT — PATIENT HEALTH QUESTIONNAIRE - PHQ9
10. IF YOU CHECKED OFF ANY PROBLEMS, HOW DIFFICULT HAVE THESE PROBLEMS MADE IT FOR YOU TO DO YOUR WORK, TAKE CARE OF THINGS AT HOME, OR GET ALONG WITH OTHER PEOPLE: 2
6. FEELING BAD ABOUT YOURSELF - OR THAT YOU ARE A FAILURE OR HAVE LET YOURSELF OR YOUR FAMILY DOWN: 2
1. LITTLE INTEREST OR PLEASURE IN DOING THINGS: 0
7. TROUBLE CONCENTRATING ON THINGS, SUCH AS READING THE NEWSPAPER OR WATCHING TELEVISION: 1
3. TROUBLE FALLING OR STAYING ASLEEP: 3
SUM OF ALL RESPONSES TO PHQ9 QUESTIONS 1 & 2: 3
5. POOR APPETITE OR OVEREATING: 3
4. FEELING TIRED OR HAVING LITTLE ENERGY: 3
9. THOUGHTS THAT YOU WOULD BE BETTER OFF DEAD, OR OF HURTING YOURSELF: 0
SUM OF ALL RESPONSES TO PHQ QUESTIONS 1-9: 18
8. MOVING OR SPEAKING SO SLOWLY THAT OTHER PEOPLE COULD HAVE NOTICED. OR THE OPPOSITE, BEING SO FIGETY OR RESTLESS THAT YOU HAVE BEEN MOVING AROUND A LOT MORE THAN USUAL: 3
SUM OF ALL RESPONSES TO PHQ QUESTIONS 1-9: 18
2. FEELING DOWN, DEPRESSED OR HOPELESS: 3

## 2019-07-15 ENCOUNTER — OFFICE VISIT (OUTPATIENT)
Dept: INTERNAL MEDICINE | Age: 59
End: 2019-07-15
Payer: COMMERCIAL

## 2019-07-15 VITALS
HEART RATE: 92 BPM | BODY MASS INDEX: 27.35 KG/M2 | WEIGHT: 220 LBS | DIASTOLIC BLOOD PRESSURE: 80 MMHG | SYSTOLIC BLOOD PRESSURE: 130 MMHG | HEIGHT: 75 IN | TEMPERATURE: 98.6 F | OXYGEN SATURATION: 98 %

## 2019-07-15 DIAGNOSIS — F41.9 ANXIETY: Primary | ICD-10-CM

## 2019-07-15 DIAGNOSIS — F43.21 SITUATIONAL DEPRESSION: ICD-10-CM

## 2019-07-15 PROCEDURE — G8417 CALC BMI ABV UP PARAM F/U: HCPCS | Performed by: PHYSICIAN ASSISTANT

## 2019-07-15 PROCEDURE — 3017F COLORECTAL CA SCREEN DOC REV: CPT | Performed by: PHYSICIAN ASSISTANT

## 2019-07-15 PROCEDURE — 1036F TOBACCO NON-USER: CPT | Performed by: PHYSICIAN ASSISTANT

## 2019-07-15 PROCEDURE — G8427 DOCREV CUR MEDS BY ELIG CLIN: HCPCS | Performed by: PHYSICIAN ASSISTANT

## 2019-07-15 PROCEDURE — 99213 OFFICE O/P EST LOW 20 MIN: CPT | Performed by: PHYSICIAN ASSISTANT

## 2019-07-15 ASSESSMENT — ENCOUNTER SYMPTOMS
SHORTNESS OF BREATH: 0
COUGH: 0

## 2019-07-15 NOTE — PROGRESS NOTES
7/15/2019     Yaya Hernandez (:  1960) is a 62 y.o. male, here for evaluation of the following medical concerns:      Chief Complaint   Patient presents with    Anxiety     Pt was at [address], for anxiety here to f/u         HPI       Follow up on anxiety and depression  he is going through a divorce and he is having a hard time managing his anxiety   He was seen in the  [address] yesterday, and had a panic attack while here  His b/p was elevated, and he was so hot, that he had to take his shirt off . He was also pacing and could not sit still   He states he has also had some similar situations in the car, while driving   He was started on Zoloft and Buspar yesterday   Has a friend with him today, which he states helps a lot     Review of Systems   Constitutional: Negative for chills, fatigue and fever. Respiratory: Negative for cough and shortness of breath. Cardiovascular: Negative for chest pain, palpitations and leg swelling. Psychiatric/Behavioral: Positive for agitation and dysphoric mood. Negative for behavioral problems, decreased concentration, hallucinations, self-injury, sleep disturbance and suicidal ideas. The patient is nervous/anxious. Prior to Visit Medications    Medication Sig Taking? Authorizing Provider   sertraline (ZOLOFT) 50 MG tablet Take 1/2 tablet for 1 week. Then 1 full tablet.  Yes NICK Bhardwaj CNP   busPIRone (BUSPAR) 10 MG tablet Take 1 tablet by mouth 3 times daily Yes NICK Bhardwaj CNP   lisinopril (PRINIVIL;ZESTRIL) 20 MG tablet take 1 tablet daily Yes Historical Provider, MD   predniSONE (DELTASONE) 5 MG tablet TAKE 3 TABLETS EVERY DAY Yes Historical Provider, MD        Social History     Tobacco Use    Smoking status: Never Smoker    Smokeless tobacco: Never Used   Substance Use Topics    Alcohol use: No        Vitals:    07/15/19 0812   BP: 130/80   Site: Left Upper Arm   Position: Sitting   Cuff Size: Large Adult   Pulse: 92   Temp: 98.6 °F (37 °C)   TempSrc: Temporal   SpO2: 98%   Weight: 220 lb (99.8 kg)   Height: 6' 3\" (1.905 m)     Estimated body mass index is 27.5 kg/m² as calculated from the following:    Height as of this encounter: 6' 3\" (1.905 m). Weight as of this encounter: 220 lb (99.8 kg). Physical Exam   Constitutional: He is oriented to person, place, and time. He appears well-developed and well-nourished. HENT:   Head: Normocephalic and atraumatic. Cardiovascular: Normal rate, regular rhythm and normal heart sounds. Pulmonary/Chest: Effort normal and breath sounds normal.   Musculoskeletal: Normal range of motion. Neurological: He is alert and oriented to person, place, and time. Psychiatric: He has a normal mood and affect. His behavior is normal. Judgment and thought content normal.   Vitals reviewed. ASSESSMENT/PLAN:  1. Anxiety  2. Situational depression  - doing better today   - strongly advised compliance the medication as prescribed   - Betsey Lynch, PhD, Psychology, [place]  - f/u in 3 weeks, sooner if needed     Return in about 3 weeks (around 8/5/2019), or f/u anxiety. An electronic signature was used to authenticate this note.     --ERON Romero on 7/15/2019 at 9:18 AM

## 2019-07-22 ENCOUNTER — TELEPHONE (OUTPATIENT)
Dept: INTERNAL MEDICINE | Age: 59
End: 2019-07-22

## 2019-07-22 ENCOUNTER — OFFICE VISIT (OUTPATIENT)
Dept: BEHAVIORAL/MENTAL HEALTH CLINIC | Age: 59
End: 2019-07-22
Payer: COMMERCIAL

## 2019-07-22 DIAGNOSIS — F43.23 ADJUSTMENT DISORDER WITH MIXED ANXIETY AND DEPRESSED MOOD: Primary | ICD-10-CM

## 2019-07-22 DIAGNOSIS — Z63.0 MARITAL CONFLICT: ICD-10-CM

## 2019-07-22 DIAGNOSIS — F41.9 ANXIETY: ICD-10-CM

## 2019-07-22 DIAGNOSIS — F43.21 SITUATIONAL DEPRESSION: ICD-10-CM

## 2019-07-22 PROCEDURE — 90791 PSYCH DIAGNOSTIC EVALUATION: CPT | Performed by: PSYCHOLOGIST

## 2019-07-22 PROCEDURE — 1036F TOBACCO NON-USER: CPT | Performed by: PSYCHOLOGIST

## 2019-07-22 SDOH — SOCIAL STABILITY - SOCIAL INSECURITY: PROBLEMS IN RELATIONSHIP WITH SPOUSE OR PARTNER: Z63.0

## 2019-07-22 ASSESSMENT — PATIENT HEALTH QUESTIONNAIRE - PHQ9
5. POOR APPETITE OR OVEREATING: 2
6. FEELING BAD ABOUT YOURSELF - OR THAT YOU ARE A FAILURE OR HAVE LET YOURSELF OR YOUR FAMILY DOWN: 0
1. LITTLE INTEREST OR PLEASURE IN DOING THINGS: 3
SUM OF ALL RESPONSES TO PHQ9 QUESTIONS 1 & 2: 3
10. IF YOU CHECKED OFF ANY PROBLEMS, HOW DIFFICULT HAVE THESE PROBLEMS MADE IT FOR YOU TO DO YOUR WORK, TAKE CARE OF THINGS AT HOME, OR GET ALONG WITH OTHER PEOPLE: 1
9. THOUGHTS THAT YOU WOULD BE BETTER OFF DEAD, OR OF HURTING YOURSELF: 0
SUM OF ALL RESPONSES TO PHQ QUESTIONS 1-9: 12
SUM OF ALL RESPONSES TO PHQ QUESTIONS 1-9: 12
8. MOVING OR SPEAKING SO SLOWLY THAT OTHER PEOPLE COULD HAVE NOTICED. OR THE OPPOSITE, BEING SO FIGETY OR RESTLESS THAT YOU HAVE BEEN MOVING AROUND A LOT MORE THAN USUAL: 3
7. TROUBLE CONCENTRATING ON THINGS, SUCH AS READING THE NEWSPAPER OR WATCHING TELEVISION: 1
2. FEELING DOWN, DEPRESSED OR HOPELESS: 0
3. TROUBLE FALLING OR STAYING ASLEEP: 2
4. FEELING TIRED OR HAVING LITTLE ENERGY: 1

## 2019-07-22 NOTE — PROGRESS NOTES
of the week. A year ago, his daughter moved out and \"this all started\". Pt reports a very positive relationship with children, they speak daily. Pt Works FT as a  of a [email or web address] but does not enjoys his work. 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 Pt describes a \"svetlana\" relationship with wife- Fariba Valera keeps telling me that everything is going to be okay\" related to encouraging him to move on without her. Pt has gone on a few dates. Pt's wife has stated she has no intention to reconcile, even if her current relationship doesn't work out. Pt note this is confusing as she gives some signals that she cares because they kiss and hug everyday. Pt is from this area, has a positive relationship with extended family, good friends in his life. Pt is somewhat social with friends, not other participation. Pt enjoys [employer], [employer] but doesn't currently. Pt started Zoloft but at night because he was feeling very lethargic, takes Buspar. Has started to read the five love languages but his wife encouraged him to stop. Pt denies SI/HI. O:  MSE:    Appearance    alert, cooperative  Appetite abnormal  Sleep disturbance Yes  Fatigue Yes  Loss of pleasure Yes  Impulsive behavior Yes  Speech    spontaneous, normal rate and normal volume  Mood    Depressed  Affect    depressed affect  Thought Content    intact  Thought Process    linear, goal directed and coherent  Associations    logical connections  Insight    Fair  Judgment    Intact  Orientation    oriented to person, place, time, and general circumstances  Memory    recent and remote memory intact  Attention/Concentration    intact  Morbid ideation No  Suicide Assessment    no suicidal ideation      History:    Medications:   Current Outpatient Medications   Medication Sig Dispense Refill    sertraline (ZOLOFT) 50 MG tablet Take 1/2 tablet for 1 week. Then 1 full tablet.  30 tablet 0    busPIRone (BUSPAR) 10 MG tablet Take 1

## 2019-08-01 ENCOUNTER — OFFICE VISIT (OUTPATIENT)
Dept: INTERNAL MEDICINE | Age: 59
End: 2019-08-01
Payer: COMMERCIAL

## 2019-08-01 ENCOUNTER — OFFICE VISIT (OUTPATIENT)
Dept: BEHAVIORAL/MENTAL HEALTH CLINIC | Age: 59
End: 2019-08-01
Payer: COMMERCIAL

## 2019-08-01 VITALS
DIASTOLIC BLOOD PRESSURE: 82 MMHG | TEMPERATURE: 98.2 F | HEART RATE: 88 BPM | HEIGHT: 75 IN | BODY MASS INDEX: 27.35 KG/M2 | OXYGEN SATURATION: 98 % | SYSTOLIC BLOOD PRESSURE: 132 MMHG | WEIGHT: 220 LBS

## 2019-08-01 DIAGNOSIS — F41.9 ANXIETY: ICD-10-CM

## 2019-08-01 DIAGNOSIS — J01.01 ACUTE RECURRENT MAXILLARY SINUSITIS: Primary | ICD-10-CM

## 2019-08-01 DIAGNOSIS — F43.23 ADJUSTMENT DISORDER WITH MIXED ANXIETY AND DEPRESSED MOOD: Primary | ICD-10-CM

## 2019-08-01 DIAGNOSIS — F43.21 SITUATIONAL DEPRESSION: ICD-10-CM

## 2019-08-01 DIAGNOSIS — Z63.0 MARITAL CONFLICT: ICD-10-CM

## 2019-08-01 DIAGNOSIS — T50.905A MEDICATION SIDE EFFECT, INITIAL ENCOUNTER: ICD-10-CM

## 2019-08-01 PROCEDURE — G8427 DOCREV CUR MEDS BY ELIG CLIN: HCPCS | Performed by: PHYSICIAN ASSISTANT

## 2019-08-01 PROCEDURE — 3017F COLORECTAL CA SCREEN DOC REV: CPT | Performed by: PHYSICIAN ASSISTANT

## 2019-08-01 PROCEDURE — 90832 PSYTX W PT 30 MINUTES: CPT | Performed by: PSYCHOLOGIST

## 2019-08-01 PROCEDURE — 1036F TOBACCO NON-USER: CPT | Performed by: PHYSICIAN ASSISTANT

## 2019-08-01 PROCEDURE — G8417 CALC BMI ABV UP PARAM F/U: HCPCS | Performed by: PHYSICIAN ASSISTANT

## 2019-08-01 PROCEDURE — 1036F TOBACCO NON-USER: CPT | Performed by: PSYCHOLOGIST

## 2019-08-01 PROCEDURE — 99214 OFFICE O/P EST MOD 30 MIN: CPT | Performed by: PHYSICIAN ASSISTANT

## 2019-08-01 RX ORDER — AMOXICILLIN AND CLAVULANATE POTASSIUM 875; 125 MG/1; MG/1
1 TABLET, FILM COATED ORAL 2 TIMES DAILY
Qty: 20 TABLET | Refills: 0 | Status: SHIPPED | OUTPATIENT
Start: 2019-08-01 | End: 2019-09-03 | Stop reason: ALTCHOICE

## 2019-08-01 SDOH — SOCIAL STABILITY - SOCIAL INSECURITY: PROBLEMS IN RELATIONSHIP WITH SPOUSE OR PARTNER: Z63.0

## 2019-08-01 ASSESSMENT — PATIENT HEALTH QUESTIONNAIRE - PHQ9
5. POOR APPETITE OR OVEREATING: 3
SUM OF ALL RESPONSES TO PHQ QUESTIONS 1-9: 14
6. FEELING BAD ABOUT YOURSELF - OR THAT YOU ARE A FAILURE OR HAVE LET YOURSELF OR YOUR FAMILY DOWN: 0
9. THOUGHTS THAT YOU WOULD BE BETTER OFF DEAD, OR OF HURTING YOURSELF: 0
3. TROUBLE FALLING OR STAYING ASLEEP: 2
7. TROUBLE CONCENTRATING ON THINGS, SUCH AS READING THE NEWSPAPER OR WATCHING TELEVISION: 2
SUM OF ALL RESPONSES TO PHQ QUESTIONS 1-9: 14
8. MOVING OR SPEAKING SO SLOWLY THAT OTHER PEOPLE COULD HAVE NOTICED. OR THE OPPOSITE, BEING SO FIGETY OR RESTLESS THAT YOU HAVE BEEN MOVING AROUND A LOT MORE THAN USUAL: 1
1. LITTLE INTEREST OR PLEASURE IN DOING THINGS: 3
SUM OF ALL RESPONSES TO PHQ9 QUESTIONS 1 & 2: 4
2. FEELING DOWN, DEPRESSED OR HOPELESS: 1
4. FEELING TIRED OR HAVING LITTLE ENERGY: 2
10. IF YOU CHECKED OFF ANY PROBLEMS, HOW DIFFICULT HAVE THESE PROBLEMS MADE IT FOR YOU TO DO YOUR WORK, TAKE CARE OF THINGS AT HOME, OR GET ALONG WITH OTHER PEOPLE: 1

## 2019-08-01 ASSESSMENT — ENCOUNTER SYMPTOMS
COUGH: 0
SHORTNESS OF BREATH: 0
ABDOMINAL PAIN: 0

## 2019-08-01 NOTE — PATIENT INSTRUCTIONS
travel. The soha comes with 35 nature tracks, which include soothing classics like crickets chirping, breaking waves, and a serene lake. You can download more free tracks to MOG, and customize a favorite combination that helps you reduce your anxiety in a peaceful setting. Allow the sounds of nature to sweep you away from your worries in the comfort of your living room, office, or bedroom. Nature Sounds Relax and Sleep  Platform: Android  Cost: Free  If you find yourself longing for the sound of the ocean to help you relax, the Andrey Hannifin and Sleep soha is for you. Open this soha whenever youre feeling anxious or stressed. You can select locations or sounds like the jungle, ocean, or thunder and slip away into a place of relaxation and comfort. If the sounds make you feel sleepy, even better. Use the soha to doze off into a relaxing slumber  Calming Music to Simplicity  Platform: Android  Cost: Free  [employer] arent the only relaxing tunes in smartphone apps. Music, especially some traditional UannaBe music, can be relaxing and soothing. The Calming Music to Simplicity soha contains nine traditional UannaBe music selections. Press play and let your worries melt away. Relax Ocean Waves Sleep by [employer]  Platform: Android  Cost: Free  Living far from a beach doesnt mean you have to be far from total relaxation. Slip on a set of headphones and drift into a distant sand-and-suds oasis. Whether youre trying to head off an anxiety attack or just need to get some good sleep after a few anxious days, the Relax Ocean Waves Sleep soha helps you find a place of serenity.  --------------------------------------------------------------  Evelia Bourgeois Meditation Relaxation  Platform: Android  Cost: Free  Evelia Pandyaois is a traditional [facility] health system that brings together posture, breathing, and the mind to reduce anxiety.  This Android soha connects users with a ReNew Power 19 of relaxation videos that contain instructions for relaxing and clearing the mind. The videos are created by Dr. Nito Pittman, a psychologist with more than 20 years of experience. In addition to viewing Dr. Shreyas Lizarraga videos, you can read a variety of articles related to anxiety, meditation, and stress management. Anxiety Free  Platform: [employer]Works   Cost: Free  Meditation requires mindfulness and a sense of presence in your thoughts. Hypnosis is one step beyond meditation. It works by sending signals to your brain and transforming it almost unknowingly. The creators of this soha say that its audio recordings contain subliminal signals that speak to the subconscious with powerful effect.  Hypnosis, like meditation, requires practice, and the goal is to get each user to a place where self-hypnosis is possible in order to reduce anxiety. Relax & Rest Guided Meditations  Platform: [employer] and RVX  Cost: $0.99  While group meetings and discussions are always an option, some people find relaxation more easily on their own. This soha lets you relax in the space of your own home or office with three guided meditations. Breath Awareness Guided Meditation (5 minutes), Deep Rested Guided Meditation (13 minutes), and Whole Body Guided Relaxation (24 minutes) are designed specifically to help you relax and sink into a peaceful meditation moment. Equanimity - Meditation Timer & Tracker  Platform: [employer]   Cost: $4.99  Meditation is one way you can cope with the symptoms and side effects of anxiety. People who meditate can eventually train themselves to stay calm when feeling stressed or anxious. Equanimity - Meditation Timer & Tracker is simple and straightforward. Time each session and watch for visual light cues to let you know how long youve been meditating. Take notes about each session, and watch your progress as you learn to manage your stress and anxiety.   Virtual Hope Box  Platform: iPhone and Android  Cost: Free  The Automatic Data Box (VHB) is a

## 2019-09-03 ENCOUNTER — OFFICE VISIT (OUTPATIENT)
Dept: INTERNAL MEDICINE | Age: 59
End: 2019-09-03
Payer: COMMERCIAL

## 2019-09-03 VITALS
SYSTOLIC BLOOD PRESSURE: 108 MMHG | BODY MASS INDEX: 27.6 KG/M2 | HEART RATE: 42 BPM | TEMPERATURE: 98.1 F | DIASTOLIC BLOOD PRESSURE: 70 MMHG | WEIGHT: 222 LBS | OXYGEN SATURATION: 99 % | HEIGHT: 75 IN

## 2019-09-03 DIAGNOSIS — I95.89 OTHER SPECIFIED HYPOTENSION: ICD-10-CM

## 2019-09-03 DIAGNOSIS — I48.91 NEW ONSET A-FIB (HCC): Primary | ICD-10-CM

## 2019-09-03 DIAGNOSIS — R00.2 HEART PALPITATIONS: ICD-10-CM

## 2019-09-03 PROCEDURE — 3017F COLORECTAL CA SCREEN DOC REV: CPT | Performed by: PHYSICIAN ASSISTANT

## 2019-09-03 PROCEDURE — 93000 ELECTROCARDIOGRAM COMPLETE: CPT | Performed by: PHYSICIAN ASSISTANT

## 2019-09-03 PROCEDURE — 99215 OFFICE O/P EST HI 40 MIN: CPT | Performed by: PHYSICIAN ASSISTANT

## 2019-09-03 PROCEDURE — G8417 CALC BMI ABV UP PARAM F/U: HCPCS | Performed by: PHYSICIAN ASSISTANT

## 2019-09-03 PROCEDURE — G8427 DOCREV CUR MEDS BY ELIG CLIN: HCPCS | Performed by: PHYSICIAN ASSISTANT

## 2019-09-03 PROCEDURE — 1036F TOBACCO NON-USER: CPT | Performed by: PHYSICIAN ASSISTANT

## 2019-09-03 RX ORDER — BUSPIRONE HYDROCHLORIDE 10 MG/1
10 TABLET ORAL 3 TIMES DAILY
COMMUNITY
End: 2019-12-06 | Stop reason: ALTCHOICE

## 2019-09-03 ASSESSMENT — ENCOUNTER SYMPTOMS
ABDOMINAL PAIN: 0
SHORTNESS OF BREATH: 0

## 2019-09-03 NOTE — PROGRESS NOTES
Estimated body mass index is 27.75 kg/m² as calculated from the following:    Height as of this encounter: 6' 3\" (1.905 m). Weight as of this encounter: 222 lb (100.7 kg). Physical Exam   Constitutional: He appears well-developed and well-nourished. HENT:   Head: Normocephalic. Nose: Nose normal.   Eyes: Pupils are equal, round, and reactive to light. Conjunctivae and EOM are normal.   Neck: Normal range of motion. Neck supple. Cardiovascular: Normal rate and normal heart sounds. An irregularly irregular rhythm present. Pulmonary/Chest: Effort normal and breath sounds normal.   Musculoskeletal: Normal range of motion. Neurological: He is alert. Skin: Skin is warm. Vitals reviewed. ASSESSMENT/PLAN:  1. New onset a-fib ([address]  2. Other specified hypotension  3. Heart palpitation  - EKG 12 lead- shows new onset a fib  - advised pt to go tot he ED, he called a family member to drive him   - called report to [place] [id number]       No follow-ups on file. An electronic signature was used to authenticate this note.     --ERON Bullard on 9/3/2019 at 4:09 PM

## 2019-09-30 DIAGNOSIS — F43.21 SITUATIONAL DEPRESSION: ICD-10-CM

## 2019-09-30 DIAGNOSIS — F41.9 ANXIETY: ICD-10-CM

## 2019-12-06 ENCOUNTER — OFFICE VISIT (OUTPATIENT)
Dept: INTERNAL MEDICINE | Age: 59
End: 2019-12-06

## 2019-12-06 VITALS
HEART RATE: 66 BPM | BODY MASS INDEX: 29.39 KG/M2 | DIASTOLIC BLOOD PRESSURE: 88 MMHG | SYSTOLIC BLOOD PRESSURE: 138 MMHG | TEMPERATURE: 98.2 F | OXYGEN SATURATION: 99 % | WEIGHT: 236.4 LBS | HEIGHT: 75 IN

## 2019-12-06 DIAGNOSIS — J22 LOWER RESPIRATORY INFECTION (E.G., BRONCHITIS, PNEUMONIA, PNEUMONITIS, PULMONITIS): Primary | ICD-10-CM

## 2019-12-06 PROCEDURE — 99212 OFFICE O/P EST SF 10 MIN: CPT | Performed by: PHYSICIAN ASSISTANT

## 2019-12-06 RX ORDER — DILTIAZEM HYDROCHLORIDE 180 MG/1
CAPSULE, COATED, EXTENDED RELEASE ORAL
Refills: 6 | COMMUNITY
Start: 2019-11-04 | End: 2021-05-05 | Stop reason: ALTCHOICE

## 2019-12-06 RX ORDER — FLUTICASONE PROPIONATE 50 MCG
1 SPRAY, SUSPENSION (ML) NASAL DAILY
COMMUNITY
End: 2021-05-05 | Stop reason: ALTCHOICE

## 2019-12-06 RX ORDER — APIXABAN 5 MG/1
TABLET, FILM COATED ORAL
Refills: 3 | COMMUNITY
Start: 2019-10-04

## 2019-12-06 RX ORDER — AZITHROMYCIN 250 MG/1
250 TABLET, FILM COATED ORAL SEE ADMIN INSTRUCTIONS
Qty: 6 TABLET | Refills: 0 | Status: SHIPPED | OUTPATIENT
Start: 2019-12-06 | End: 2019-12-11

## 2019-12-06 RX ORDER — METOPROLOL TARTRATE 50 MG/1
TABLET, FILM COATED ORAL
Refills: 3 | COMMUNITY
Start: 2019-11-29 | End: 2022-07-27

## 2019-12-06 RX ORDER — FLUTICASONE PROPIONATE 50 MCG
2 SPRAY, SUSPENSION (ML) NASAL DAILY
Qty: 1 BOTTLE | Refills: 1 | Status: SHIPPED | OUTPATIENT
Start: 2019-12-06 | End: 2021-01-24

## 2019-12-06 ASSESSMENT — ENCOUNTER SYMPTOMS
COUGH: 1
SORE THROAT: 0
WHEEZING: 0
SHORTNESS OF BREATH: 0
RHINORRHEA: 1

## 2019-12-12 ENCOUNTER — HOSPITAL ENCOUNTER (OUTPATIENT)
Dept: ULTRASOUND IMAGING | Age: 59
Discharge: HOME | End: 2019-12-14

## 2019-12-12 ENCOUNTER — OFFICE VISIT (OUTPATIENT)
Dept: INTERNAL MEDICINE | Age: 59
End: 2019-12-12

## 2019-12-12 VITALS
SYSTOLIC BLOOD PRESSURE: 142 MMHG | WEIGHT: 241.1 LBS | BODY MASS INDEX: 29.98 KG/M2 | HEART RATE: 66 BPM | TEMPERATURE: 98.9 F | OXYGEN SATURATION: 98 % | DIASTOLIC BLOOD PRESSURE: 82 MMHG | HEIGHT: 75 IN

## 2019-12-12 DIAGNOSIS — I10 ESSENTIAL HYPERTENSION: ICD-10-CM

## 2019-12-12 DIAGNOSIS — N50.82 ACUTE PAIN IN SCROTUM: Primary | ICD-10-CM

## 2019-12-12 DIAGNOSIS — N50.82 ACUTE PAIN IN SCROTUM: ICD-10-CM

## 2019-12-12 DIAGNOSIS — N50.89 TESTICULAR MICROLITHIASIS: ICD-10-CM

## 2019-12-12 DIAGNOSIS — N50.89 EPIDIDYMAL THICKENING: Primary | ICD-10-CM

## 2019-12-12 PROCEDURE — 76870 US EXAM SCROTUM: CPT

## 2019-12-12 PROCEDURE — 99213 OFFICE O/P EST LOW 20 MIN: CPT | Performed by: PHYSICIAN ASSISTANT

## 2019-12-12 RX ORDER — LISINOPRIL 10 MG/1
10 TABLET ORAL DAILY
Qty: 90 TABLET | Refills: 1 | Status: SHIPPED | OUTPATIENT
Start: 2019-12-12 | End: 2021-05-05 | Stop reason: ALTCHOICE

## 2019-12-12 RX ORDER — CIPROFLOXACIN 500 MG/1
500 TABLET, FILM COATED ORAL 2 TIMES DAILY
Qty: 20 TABLET | Refills: 0 | Status: SHIPPED | OUTPATIENT
Start: 2019-12-12 | End: 2019-12-22

## 2019-12-17 ASSESSMENT — ENCOUNTER SYMPTOMS
COUGH: 0
SHORTNESS OF BREATH: 0

## 2020-11-16 ENCOUNTER — HOSPITAL ENCOUNTER (OUTPATIENT)
Dept: GENERAL RADIOLOGY | Age: 60
Discharge: HOME | End: 2020-11-18
Payer: COMMERCIAL

## 2020-11-16 ENCOUNTER — OFFICE VISIT (OUTPATIENT)
Dept: INTERNAL MEDICINE | Age: 60
End: 2020-11-16
Payer: COMMERCIAL

## 2020-11-16 ENCOUNTER — HOSPITAL ENCOUNTER (OUTPATIENT)
Age: 60
Discharge: HOME | End: 2020-11-18
Payer: COMMERCIAL

## 2020-11-16 VITALS
BODY MASS INDEX: 29.89 KG/M2 | WEIGHT: 240.4 LBS | TEMPERATURE: 97.6 F | HEIGHT: 75 IN | HEART RATE: 70 BPM | OXYGEN SATURATION: 98 % | SYSTOLIC BLOOD PRESSURE: 128 MMHG | DIASTOLIC BLOOD PRESSURE: 74 MMHG

## 2020-11-16 PROCEDURE — 73030 X-RAY EXAM OF SHOULDER: CPT

## 2020-11-16 PROCEDURE — 99214 OFFICE O/P EST MOD 30 MIN: CPT | Performed by: PHYSICIAN ASSISTANT

## 2020-11-22 ASSESSMENT — ENCOUNTER SYMPTOMS
GASTROINTESTINAL NEGATIVE: 1
RESPIRATORY NEGATIVE: 1
BACK PAIN: 0

## 2021-01-24 DIAGNOSIS — J22 LOWER RESPIRATORY INFECTION (E.G., BRONCHITIS, PNEUMONIA, PNEUMONITIS, PULMONITIS): ICD-10-CM

## 2021-01-24 RX ORDER — FLUTICASONE PROPIONATE 50 MCG
SPRAY, SUSPENSION (ML) NASAL
Qty: 16 G | Refills: 1 | Status: SHIPPED | OUTPATIENT
Start: 2021-01-24 | End: 2021-05-05 | Stop reason: ALTCHOICE

## 2021-01-24 NOTE — TELEPHONE ENCOUNTER
Requesting medication refill. Please approve or deny this request.    Rx requested:  Requested Prescriptions     Pending Prescriptions Disp Refills    fluticasone (FLONASE) 50 MCG/ACT nasal spray [Pharmacy Med Name: fluticasone propionate 50 mcg/actuation nasal spray,suspension] 16 g 1     Sig: Use 2 sprays by Nasal route daily       Last Office Visit:   11/16/2020        REASON LAST SEEN AND BY WHO:    Shoulder injury    FOLLOW UP PLAN FROM LAST PCP VISIT: COPY AND PASTE FROM LAST PCP NOTE    none      PATIENT CONTACTED FOR A FOLLOW UP APPT: YES OR NO    no    Next Visit Date:  No future appointments.

## 2021-05-05 ENCOUNTER — OFFICE VISIT (OUTPATIENT)
Dept: INTERNAL MEDICINE | Age: 61
End: 2021-05-05
Payer: COMMERCIAL

## 2021-05-05 VITALS
HEART RATE: 67 BPM | DIASTOLIC BLOOD PRESSURE: 62 MMHG | TEMPERATURE: 96.9 F | OXYGEN SATURATION: 92 % | BODY MASS INDEX: 30.57 KG/M2 | WEIGHT: 244.6 LBS | SYSTOLIC BLOOD PRESSURE: 104 MMHG | RESPIRATION RATE: 12 BRPM

## 2021-05-05 DIAGNOSIS — B34.9 VIRAL ILLNESS: Primary | ICD-10-CM

## 2021-05-05 DIAGNOSIS — J01.40 ACUTE NON-RECURRENT PANSINUSITIS: ICD-10-CM

## 2021-05-05 PROCEDURE — 99213 OFFICE O/P EST LOW 20 MIN: CPT | Performed by: NURSE PRACTITIONER

## 2021-05-05 RX ORDER — FLUTICASONE PROPIONATE 50 MCG
1 SPRAY, SUSPENSION (ML) NASAL DAILY
Qty: 1 BOTTLE | Refills: 0 | Status: SHIPPED | OUTPATIENT
Start: 2021-05-05 | End: 2021-05-05

## 2021-05-05 RX ORDER — CLOPIDOGREL BISULFATE 75 MG/1
TABLET ORAL
COMMUNITY
Start: 2021-02-01 | End: 2022-02-04 | Stop reason: ALTCHOICE

## 2021-05-05 RX ORDER — NITROGLYCERIN 0.4 MG/1
TABLET SUBLINGUAL
COMMUNITY
Start: 2021-02-01

## 2021-05-05 RX ORDER — LISINOPRIL AND HYDROCHLOROTHIAZIDE 25; 20 MG/1; MG/1
TABLET ORAL
COMMUNITY
End: 2022-02-04 | Stop reason: ALTCHOICE

## 2021-05-05 RX ORDER — POTASSIUM CHLORIDE 750 MG/1
TABLET, FILM COATED, EXTENDED RELEASE ORAL
COMMUNITY
End: 2022-07-27

## 2021-05-05 RX ORDER — ATORVASTATIN CALCIUM 40 MG/1
TABLET, FILM COATED ORAL
COMMUNITY
Start: 2021-02-01 | End: 2022-02-04

## 2021-05-05 RX ORDER — FLUTICASONE PROPIONATE 50 MCG
1 SPRAY, SUSPENSION (ML) NASAL DAILY
Qty: 1 BOTTLE | Refills: 1 | Status: SHIPPED | OUTPATIENT
Start: 2021-05-05 | End: 2021-07-20 | Stop reason: ALTCHOICE

## 2021-05-05 ASSESSMENT — ENCOUNTER SYMPTOMS
ABDOMINAL PAIN: 1
COUGH: 1
DIARRHEA: 0
NAUSEA: 0
VOMITING: 0
SORE THROAT: 1
SINUS PRESSURE: 1

## 2021-05-05 NOTE — PATIENT INSTRUCTIONS
Patient Education        Sinusitis: Care Instructions  Your Care Instructions     Sinusitis is an infection of the lining of the sinus cavities in your head. Sinusitis often follows a cold. It causes pain and pressure in your head and face. In most cases, sinusitis gets better on its own in 1 to 2 weeks. But some mild symptoms may last for several weeks. Sometimes antibiotics are needed. Follow-up care is a key part of your treatment and safety. Be sure to make and go to all appointments, and call your doctor if you are having problems. It's also a good idea to know your test results and keep a list of the medicines you take. How can you care for yourself at home? · Take an over-the-counter pain medicine, such as acetaminophen (Tylenol), ibuprofen (Advil, Motrin), or naproxen (Aleve). Read and follow all instructions on the label. · If the doctor prescribed antibiotics, take them as directed. Do not stop taking them just because you feel better. You need to take the full course of antibiotics. · Be careful when taking over-the-counter cold or flu medicines and Tylenol at the same time. Many of these medicines have acetaminophen, which is Tylenol. Read the labels to make sure that you are not taking more than the recommended dose. Too much acetaminophen (Tylenol) can be harmful. · Breathe warm, moist air from a steamy shower, a hot bath, or a sink filled with hot water. Avoid cold, dry air. Using a humidifier in your home may help. Follow the directions for cleaning the machine. · Use saline (saltwater) nasal washes. This can help keep your nasal passages open and wash out mucus and bacteria. You can buy saline nose drops at a grocery store or drugstore. Or you can make your own at home by adding 1 teaspoon of salt and 1 teaspoon of baking soda to 2 cups of distilled water. If you make your own, fill a bulb syringe with the solution, insert the tip into your nostril, and squeeze gently.  Revonda Osler your without treatment. However, changing your diet can help end the problem. Some over-the-counter medicines can help prevent gas and relieve bloating. Follow-up care is a key part of your treatment and safety. Be sure to make and go to all appointments, and call your doctor if you are having problems. It's also a good idea to know your test results and keep a list of the medicines you take. How can you care for yourself at home? · Keep a food diary if you think a food gives you gas. Write down what you eat or drink. Also record when you get gas. If you notice that a food seems to cause your gas each time, avoid it and see if the gas goes away. Examples of foods that cause gas include:  ? Fried and fatty foods. ? Beans. ? Vegetables such as artichokes, asparagus, broccoli, brussels sprouts, cabbage, cauliflower, cucumbers, green peppers, onions, peas, radishes, and raw potatoes. ? Fruits such as apricots, bananas, melons, peaches, pears, prunes, and raw apples. ? Wheat and wheat bran. · Soak dry beans in water overnight, then dump the water and cook the soaked beans in new water. This can help prevent gas and bloating. · If you have problems with lactose, avoid dairy products such as milk and cheese. · Try not to swallow air. Do not drink through a straw, gulp your food, or chew gum. · Take an over-the-counter medicine. Read and follow all instructions on the label. ? Food enzymes, such as Beano, can be added to gas-producing foods to prevent gas. ? Antacids, such as Maalox Anti-Gas and Mylanta Gas, can relieve bloating by making you burp. Be careful when you take over-the-counter antacid medicines. Many of these medicines have aspirin in them. Read the label to make sure that you are not taking more than the recommended dose. Too much aspirin can be harmful. ? Activated charcoal tablets, such as CharcoCaps, may decrease odor from gas you pass.   ? If you have problems with lactose, you can take medicines such as Dairy Ease and Lactaid with dairy products to prevent gas and bloating. · Get some exercise regularly. When should you call for help? Call 911 anytime you think you may need emergency care. For example, call if:    · You have gas and signs of a heart attack, such as:  ? Chest pain or pressure. ? Sweating. ? Shortness of breath. ? Nausea or vomiting. ? Pain that spreads from the chest to the neck, jaw, or one or both shoulders or arms. ? Dizziness or lightheadedness. ? A fast or uneven pulse. After calling 911, chew 1 adult-strength aspirin. Wait for an ambulance. Do not try to drive yourself. Call your doctor now or seek immediate medical care if:    · You have severe belly pain.     · You have blood in your stool. Watch closely for changes in your health, and be sure to contact your doctor if:    · You have blood or pus in your urine.     · Your urine is cloudy or smells bad.     · You are burping and have trouble swallowing.     · You feel bloated and have swelling in your belly.     · You do not get better as expected. Where can you learn more? Go to https://[employer] Justice Systemspe[place]mont.Free-lance.ru. org and sign in to your S[employer]X account. Enter J814 in the Location Labs box to learn more about \"Gas and Bloating: Care Instructions. \"     If you do not have an account, please click on the \"Sign Up Now\" link. Current as of: February 26, 2020               Content Version: 12.8  © 1909-3990 [employer]. Care instructions adapted under license by [facility] ([facility]). If you have questions about a medical condition or this instruction, always ask your healthcare professional. [place] [address] any warranty or liability for your use of this information.

## 2021-05-05 NOTE — PROGRESS NOTES
Ginette Pereira (:  1960) is a 61 y.o. male, Established patient, here for evaluation of the following chief complaint(s):  Congestion (sinus pain and pressure, x 3 days, ) and Bloated      Vitals:    21 1138   BP: 104/62   Pulse: 67   Resp: 12   Temp: 96.9 °F (36.1 °C)   SpO2: 92%       ASSESSMENT/PLAN:  1. Viral illness     -    BRAT diet      -   Avoid caffeine, dairy, spicy, fried foods      -   Fluids encouraged, non-caffeine    2. Acute non-recurrent pansinusitis  -     fluticasone (FLONASE) 50 MCG/ACT nasal spray; 1 spray by Nasal route daily, Disp-1 Bottle, R-1Normal        Return if symptoms worsen or fail to improve. SUBJECTIVE/OBJECTIVE:    Sinusitis  This is a new problem. Episode onset: x3 days. The problem has been gradually worsening since onset. There has been no fever. Associated symptoms include congestion, coughing, ear pain (popping, right worse than left), headaches (frontal lobe), sinus pressure, sneezing and a sore throat (scratchy). Treatments tried: claritin. Abdominal Pain  This is a new problem. Episode onset: x3 days. The problem occurs constantly. The problem has been unchanged. The pain is located in the epigastric region. The pain is at a severity of 7/10. The pain is moderate. Quality: bloated. The abdominal pain does not radiate. Associated symptoms include headaches (frontal lobe). Pertinent negatives include no diarrhea (softer stools), myalgias, nausea or vomiting. Treatments tried: pepto bismal. The treatment provided no relief. Review of Systems   HENT: Positive for congestion, ear pain (popping, right worse than left), sinus pressure, sneezing and sore throat (scratchy). Respiratory: Positive for cough. Gastrointestinal: Positive for abdominal pain. Negative for diarrhea (softer stools), nausea and vomiting. Musculoskeletal: Negative for myalgias. Neurological: Positive for headaches (frontal lobe). Physical Exam  Vitals signs reviewed. Constitutional:       General: He is not in acute distress. Appearance: Normal appearance. He is well-groomed. HENT:      Right Ear: A middle ear effusion is present. Tympanic membrane is not erythematous. Left Ear: A middle ear effusion is present. Tympanic membrane is not erythematous. Nose: Mucosal edema present. Right Turbinates: Swollen. Left Turbinates: Swollen. Right Sinus: Maxillary sinus tenderness and frontal sinus tenderness present. Left Sinus: Maxillary sinus tenderness and frontal sinus tenderness present. Cardiovascular:      Rate and Rhythm: Normal rate and regular rhythm. Heart sounds: Normal heart sounds, S1 normal and S2 normal.   Pulmonary:      Effort: Pulmonary effort is normal. No respiratory distress. Breath sounds: Normal air entry. No decreased breath sounds, wheezing, rhonchi or rales. Abdominal:      General: Abdomen is flat. Bowel sounds are increased. Palpations: Abdomen is soft. Tenderness: There is no abdominal tenderness. There is no right CVA tenderness or left CVA tenderness. Musculoskeletal: Normal range of motion. Skin:     General: Skin is warm and dry. Neurological:      Mental Status: He is alert and oriented to person, place, and time. Psychiatric:         Mood and Affect: Mood normal.         Behavior: Behavior is cooperative. An electronic signature was used to authenticate this note.     --NICK Fisher

## 2021-07-12 ENCOUNTER — VIRTUAL VISIT (OUTPATIENT)
Dept: INTERNAL MEDICINE | Age: 61
End: 2021-07-12
Payer: COMMERCIAL

## 2021-07-12 DIAGNOSIS — F41.0 GENERALIZED ANXIETY DISORDER WITH PANIC ATTACKS: ICD-10-CM

## 2021-07-12 DIAGNOSIS — F41.1 GENERALIZED ANXIETY DISORDER WITH PANIC ATTACKS: ICD-10-CM

## 2021-07-12 PROCEDURE — 99441 PR PHYS/QHP TELEPHONE EVALUATION 5-10 MIN: CPT | Performed by: PHYSICIAN ASSISTANT

## 2021-07-12 RX ORDER — AMLODIPINE BESYLATE 10 MG/1
TABLET ORAL
COMMUNITY
Start: 2021-06-29 | End: 2022-07-27

## 2021-07-12 SDOH — ECONOMIC STABILITY: FOOD INSECURITY: WITHIN THE PAST 12 MONTHS, YOU WORRIED THAT YOUR FOOD WOULD RUN OUT BEFORE YOU GOT MONEY TO BUY MORE.: NEVER TRUE

## 2021-07-12 SDOH — ECONOMIC STABILITY: FOOD INSECURITY: WITHIN THE PAST 12 MONTHS, THE FOOD YOU BOUGHT JUST DIDN'T LAST AND YOU DIDN'T HAVE MONEY TO GET MORE.: NEVER TRUE

## 2021-07-12 ASSESSMENT — SOCIAL DETERMINANTS OF HEALTH (SDOH): HOW HARD IS IT FOR YOU TO PAY FOR THE VERY BASICS LIKE FOOD, HOUSING, MEDICAL CARE, AND HEATING?: NOT HARD AT ALL

## 2021-07-13 ASSESSMENT — ENCOUNTER SYMPTOMS
GASTROINTESTINAL NEGATIVE: 1
RESPIRATORY NEGATIVE: 1

## 2021-07-13 NOTE — PROGRESS NOTES
Navi Ramon (: 1960) is a 61 y.o. male, Established patient, here for evaluation of the following chief complaint(s): Anxiety (took one pill last week but otherwise ok)        ASSESSMENT/PLAN:  1. Generalized anxiety disorder with panic attacks  - not having anxiety every day, so he can continue Vistaril PRN   - f/u if needed       No follow-ups on file. SUBJECTIVE/OBJECTIVE:  HPI      Anxiety follow up  He took his last Vistaril last week , when he had some anxiety symptoms   States he uses to be on Zoloft daily, but has not needed it    Vistaril works as it should need he needs it and wants to continue it PRN     Review of Systems   Constitutional: Negative. Respiratory: Negative. Cardiovascular: Negative. Gastrointestinal: Negative. Psychiatric/Behavioral: The patient is nervous/anxious. No flowsheet data found. Physical Exam  Pulmonary:      Effort: Pulmonary effort is normal.   Neurological:      Mental Status: He is alert and oriented to person, place, and time. Psychiatric:         Mood and Affect: Mood normal.         Behavior: Behavior normal.         Thought Content: Thought content normal.         Judgment: Judgment normal.         There were no vitals filed for this visit. On this date 2021 I have spent 7 minutes reviewing previous notes, test results and face to face (virtual) with the patient discussing the diagnosis and importance of compliance with the treatment plan as well as documenting on the day of the visit. Navi Ramon is a 61 y.o. male being evaluated by a Virtual Visit (video visit) encounter to address concerns as mentioned above. A caregiver was present when appropriate. Due to this being a TeleHealth encounter (During [place] [id number] public [facility] emergency), evaluation of the following organ systems was limited: Vitals/Constitutional/EENT/Resp/CV/GI//MS/Neuro/Skin/Heme-Lymph-Imm.   Pursuant to the emergency declaration under the [address] and the [address] [facility] authority and the Coronavirus Preparedness and [employer] Act, this Virtual Visit was conducted with patient's (and/or legal guardian's) consent, to reduce the patient's risk of exposure to COVID-19 and provide necessary medical care. The patient (and/or legal guardian) has also been advised to contact this office for worsening conditions or problems, and seek emergency medical treatment and/or call 911 if deemed necessary. Patient identification was verified at the start of the visit: Yes    Services were provided through a video synchronous discussion virtually to substitute for in-person clinic visit. Patient was located at home and provider was located in office or at home. An electronic signature was used to authenticate this note.     --Nils Osler, PA

## 2021-07-20 ENCOUNTER — OFFICE VISIT (OUTPATIENT)
Dept: INTERNAL MEDICINE | Age: 61
End: 2021-07-20
Payer: COMMERCIAL

## 2021-07-20 VITALS
HEIGHT: 75 IN | HEART RATE: 66 BPM | SYSTOLIC BLOOD PRESSURE: 110 MMHG | WEIGHT: 242 LBS | TEMPERATURE: 97.3 F | BODY MASS INDEX: 30.09 KG/M2 | OXYGEN SATURATION: 98 % | DIASTOLIC BLOOD PRESSURE: 68 MMHG

## 2021-07-20 DIAGNOSIS — R09.81 NASAL CONGESTION: ICD-10-CM

## 2021-07-20 DIAGNOSIS — H66.002 ACUTE SUPPURATIVE OTITIS MEDIA OF LEFT EAR WITHOUT SPONTANEOUS RUPTURE OF TYMPANIC MEMBRANE, RECURRENCE NOT SPECIFIED: Primary | ICD-10-CM

## 2021-07-20 PROCEDURE — 99213 OFFICE O/P EST LOW 20 MIN: CPT | Performed by: NURSE PRACTITIONER

## 2021-07-20 RX ORDER — AMOXICILLIN AND CLAVULANATE POTASSIUM 875; 125 MG/1; MG/1
1 TABLET, FILM COATED ORAL 2 TIMES DAILY
Qty: 20 TABLET | Refills: 0 | Status: SHIPPED | OUTPATIENT
Start: 2021-07-20 | End: 2021-07-30

## 2021-07-20 RX ORDER — FLUTICASONE PROPIONATE 50 MCG
1 SPRAY, SUSPENSION (ML) NASAL DAILY
Qty: 1 BOTTLE | Refills: 0 | Status: SHIPPED | OUTPATIENT
Start: 2021-07-20 | End: 2021-12-28

## 2021-07-20 ASSESSMENT — ENCOUNTER SYMPTOMS
SINUS PRESSURE: 0
COUGH: 1
CHEST TIGHTNESS: 0
EYE ITCHING: 0
EYE DISCHARGE: 0
WHEEZING: 0
SORE THROAT: 0
RHINORRHEA: 1
SHORTNESS OF BREATH: 0
EYE REDNESS: 0

## 2021-07-20 NOTE — PATIENT INSTRUCTIONS
be sure to contact your doctor if:    · You have new or worse symptoms.     · You are not getting better after taking an antibiotic for 2 days. Where can you learn more? Go to https://UltiZenpeCatchSquare.TenasiTech. org and sign in to your VirtualSharp Software account. Enter V724 in the Breezie box to learn more about \"Ear Infection (Otitis Media): Care Instructions. \"     If you do not have an account, please click on the \"Sign Up Now\" link. Current as of: December 2, 2020               Content Version: 12.9  © 2610-2009 Healthwise, Incorporated. Care instructions adapted under license by [facility] ([facility]). If you have questions about a medical condition or this instruction, always ask your healthcare professional. [address] any warranty or liability for your use of this information.

## 2021-07-26 ENCOUNTER — TELEPHONE (OUTPATIENT)
Dept: INTERNAL MEDICINE | Age: 61
End: 2021-07-26

## 2021-07-26 NOTE — TELEPHONE ENCOUNTER
Patient still has ear pain, wants a new antibiotic. Patient would like a phone about the new possible prescription being sent to pharmacy.

## 2021-07-30 ENCOUNTER — TELEPHONE (OUTPATIENT)
Dept: INTERNAL MEDICINE | Age: 61
End: 2021-07-30

## 2021-07-30 ENCOUNTER — OFFICE VISIT (OUTPATIENT)
Dept: INTERNAL MEDICINE | Age: 61
End: 2021-07-30
Payer: COMMERCIAL

## 2021-07-30 VITALS
TEMPERATURE: 98.2 F | HEIGHT: 75 IN | DIASTOLIC BLOOD PRESSURE: 96 MMHG | BODY MASS INDEX: 29.84 KG/M2 | WEIGHT: 240 LBS | SYSTOLIC BLOOD PRESSURE: 130 MMHG | HEART RATE: 92 BPM | OXYGEN SATURATION: 97 %

## 2021-07-30 DIAGNOSIS — H66.90 RECURRENT AOM (ACUTE OTITIS MEDIA): Primary | ICD-10-CM

## 2021-07-30 PROCEDURE — 99213 OFFICE O/P EST LOW 20 MIN: CPT | Performed by: FAMILY MEDICINE

## 2021-07-30 RX ORDER — LEVOFLOXACIN 750 MG/1
750 TABLET ORAL DAILY
Qty: 10 TABLET | Refills: 0 | Status: SHIPPED | OUTPATIENT
Start: 2021-07-30 | End: 2021-08-09

## 2021-07-30 RX ORDER — ONDANSETRON 4 MG/1
4 TABLET, ORALLY DISINTEGRATING ORAL 3 TIMES DAILY PRN
Qty: 30 TABLET | Refills: 0 | Status: SHIPPED | OUTPATIENT
Start: 2021-07-30 | End: 2022-01-03 | Stop reason: ALTCHOICE

## 2021-07-30 RX ORDER — HYDROCHLOROTHIAZIDE 25 MG/1
TABLET ORAL
COMMUNITY
Start: 2021-07-12 | End: 2022-06-03

## 2021-07-30 ASSESSMENT — ENCOUNTER SYMPTOMS
RHINORRHEA: 0
WHEEZING: 0
SORE THROAT: 0
SHORTNESS OF BREATH: 0
COUGH: 0
DIARRHEA: 0
ABDOMINAL PAIN: 0
CONSTIPATION: 0

## 2021-07-30 NOTE — TELEPHONE ENCOUNTER
Pt called stating Dr. [facility] office does not take his insurance. He is requesting a new referral to someone that does. [facility] [employer] [facility] [facility]. He would like a call back with who you would like to send him to. Thank you.

## 2021-07-30 NOTE — TELEPHONE ENCOUNTER
I placed a referral to Dr. Tami Carrero at [facility]. If this doesn't work out then could consider Dr. Veronika Guevara.

## 2021-07-30 NOTE — PROGRESS NOTES
[address] [facility] [address] [facility] PRIMARY CARE  [address] [place] [address]  Dept: [phone]  Dept Fax: [phone]: [phone]     Chief Complaint  Chief Complaint   Patient presents with    Otalgia     L ear, last day of atb today     Dizziness    Nausea       HPI:  61 y.o.male who presents for the following:      Seen in walkin clinic 7/20 for L otalgia for 3 days with mild cough; symptoms attributed to L AOM and given augmentin and flonase; today feeling more pain and new dizziness (spinning sensation) with nausea. Hx 3-4 ear infections yearly; works driving . Review of Systems   Constitutional: Negative for chills and fever. HENT: Positive for ear pain. Negative for congestion, rhinorrhea and sore throat. Respiratory: Negative for cough, shortness of breath and wheezing. Gastrointestinal: Negative for abdominal pain, constipation and diarrhea. Endocrine: Negative for polydipsia and polyuria. Genitourinary: Negative for dysuria, frequency and urgency. Neurological: Positive for dizziness. Negative for syncope, light-headedness, numbness and headaches. Psychiatric/Behavioral: Negative for sleep disturbance. The patient is not nervous/anxious.         Past Medical History:   Diagnosis Date    Anxiety 7/14/2019    Hypertension     Situational depression 7/14/2019     Past Surgical History:   Procedure Laterality Date    APPENDECTOMY      KNEE SURGERY Right     interpartial     Social History     Socioeconomic History    Marital status:      Spouse name: Not on file    Number of children: Not on file    Years of education: Not on file    Highest education level: Not on file   Occupational History    Not on file   Tobacco Use    Smoking status: Never Smoker    Smokeless tobacco: Never Used   Vaping Use    Vaping Use: Never used   Substance and Sexual Activity    Alcohol use: No    Drug use: No    Sexual activity: Never   Other Topics Concern    Not on file   Social History Narrative    Not on file     Social Determinants of Health     Financial Resource Strain: Low Risk     Difficulty of Paying Living Expenses: Not hard at all   Food Insecurity: No Food Insecurity    Worried About Running Out of Food in the Last Year: Never true    [address] in the Last Year: Never true   Transportation Needs:     Lack of Transportation (Medical):  Lack of Transportation (Non-Medical):    Physical Activity:     Days of Exercise per Week:     Minutes of Exercise per Session:    Stress:     Feeling of Stress :    Social Connections:     Frequency of Communication with Friends and Family:     Frequency of Social Gatherings with Friends and Family:     Attends Confucianism Services:     Active Member of Clubs or Organizations:     Attends Club or Organization Meetings:     Marital Status:    Intimate Partner Violence:     Fear of Current or Ex-Partner:     Emotionally Abused:     Physically Abused:     Sexually Abused:      Family History   Problem Relation Age of Onset    Cancer Father         prostate    Other Father         blood clot - lung    Other Mother         scleroderma      Allergies   Allergen Reactions    Metoprolol Other (See Comments)     fatigue    Terazosin      Current Outpatient Medications   Medication Sig Dispense Refill    hydroCHLOROthiazide (HYDRODIURIL) 25 MG tablet TAKE 1 TABLET DAILY.       ondansetron (ZOFRAN-ODT) 4 MG disintegrating tablet Take 1 tablet by mouth 3 times daily as needed for Nausea or Vomiting 30 tablet 0    levoFLOXacin (LEVAQUIN) 750 MG tablet Take 1 tablet by mouth daily for 10 days 10 tablet 0    fluticasone (FLONASE) 50 MCG/ACT nasal spray 1 spray by Nasal route daily 1 Bottle 0    amoxicillin-clavulanate (AUGMENTIN) 875-125 MG per tablet Take 1 tablet by mouth 2 times daily for 10 days 20 tablet 0    amLODIPine (NORVASC) 10 MG tablet TAKE 1 TABLET DAILY.  hydrOXYzine (VISTARIL) 25 MG capsule Take 1 capsule by mouth 3 times daily as needed for Anxiety 60 capsule 1    atorvastatin (LIPITOR) 40 MG tablet Take by mouth      clopidogrel (PLAVIX) 75 MG tablet Take by mouth      lisinopril-hydroCHLOROthiazide (PRINZIDE;ZESTORETIC) 20-25 MG per tablet Take by mouth      magnesium oxide (MAG-OX) 400 (241.3 Mg) MG TABS tablet take 1 tablet daily      nitroGLYCERIN (NITROSTAT) 0.4 MG SL tablet Place under the tongue      potassium chloride (KLOR-CON) 10 MEQ extended release tablet Take by mouth      ELIQUIS 5 MG TABS tablet TAKE 1 TABLET BY MOUTH EVERY 12 HOURS  3    metoprolol tartrate (LOPRESSOR) 50 MG tablet TAKE 1 TABLET TWICE DAILY  3     No current facility-administered medications for this visit. Vitals:    07/30/21 0854   BP: (!) 130/96   Site: Right Upper Arm   Pulse: 92   Temp: 98.2 °F (36.8 °C)   TempSrc: Infrared   SpO2: 97%   Weight: 240 lb (108.9 kg)   Height: 6' 3\" (1.905 m)       Physical exam:  Physical Exam  Vitals reviewed. Constitutional:       General: He is not in acute distress. Appearance: He is well-developed. HENT:      Head: Normocephalic and atraumatic. Right Ear: Tympanic membrane, ear canal and external ear normal.      Left Ear: Ear canal and external ear normal. Tympanic membrane is scarred and bulging. Tympanic membrane is not erythematous. Ears:      Comments: L TM with resolved erythema but there is bogginess and swelling and old scarring     Mouth/Throat:      Pharynx: No oropharyngeal exudate. Neck:      Thyroid: No thyromegaly. Cardiovascular:      Rate and Rhythm: Normal rate and regular rhythm. Heart sounds: Normal heart sounds. No murmur heard. Pulmonary:      Effort: Pulmonary effort is normal. No respiratory distress. Breath sounds: Normal breath sounds. No wheezing. Abdominal:      General: There is no distension. Palpations: Abdomen is soft. Tenderness: There is no abdominal tenderness. There is no guarding or rebound. Musculoskeletal:      Cervical back: Normal range of motion. Lymphadenopathy:      Cervical: No cervical adenopathy. Skin:     General: Skin is warm and dry. Neurological:      Mental Status: He is alert and oriented to person, place, and time. Psychiatric:         Behavior: Behavior normal.         Assessment/Plan:  61 y.o. male here mainly for L AOM:  - L AoM: zofran for nausea. Changing to course of levaquin; since this has been recurring for years he might benefit from PET tubes so sending to ENT      Diagnosis Orders   1. Recurrent AOM (acute otitis media)  ondansetron (ZOFRAN-ODT) 4 MG disintegrating tablet    levoFLOXacin (LEVAQUIN) 750 MG tablet    Amb External Referral To ENT        Return if symptoms worsen or fail to improve.     oJse Cordova MD

## 2021-08-19 ENCOUNTER — OFFICE VISIT (OUTPATIENT)
Dept: FAMILY MEDICINE CLINIC | Age: 61
End: 2021-08-19
Payer: COMMERCIAL

## 2021-08-19 ENCOUNTER — HOSPITAL ENCOUNTER (OUTPATIENT)
Age: 61
Setting detail: SPECIMEN
Discharge: HOME | End: 2021-08-19
Payer: COMMERCIAL

## 2021-08-19 VITALS
BODY MASS INDEX: 29.47 KG/M2 | SYSTOLIC BLOOD PRESSURE: 130 MMHG | HEIGHT: 75 IN | OXYGEN SATURATION: 95 % | HEART RATE: 65 BPM | DIASTOLIC BLOOD PRESSURE: 82 MMHG | TEMPERATURE: 97.2 F | WEIGHT: 237 LBS

## 2021-08-19 DIAGNOSIS — R42 DIZZINESS: ICD-10-CM

## 2021-08-19 DIAGNOSIS — R42 DIZZINESS: Primary | ICD-10-CM

## 2021-08-19 DIAGNOSIS — J02.9 SORE THROAT: ICD-10-CM

## 2021-08-19 LAB
FOLATE: 11.8 NG/ML (ref 7.3–26.1)
TSH SERPL DL<=0.05 MIU/L-ACNC: 0.7 UIU/ML (ref 0.44–3.86)
VITAMIN B-12: 623 PG/ML (ref 232–1245)

## 2021-08-19 PROCEDURE — 85027 COMPLETE CBC AUTOMATED: CPT

## 2021-08-19 PROCEDURE — 82607 VITAMIN B-12: CPT

## 2021-08-19 PROCEDURE — 99213 OFFICE O/P EST LOW 20 MIN: CPT | Performed by: FAMILY MEDICINE

## 2021-08-19 PROCEDURE — 84443 ASSAY THYROID STIM HORMONE: CPT

## 2021-08-19 PROCEDURE — 82746 ASSAY OF FOLIC ACID SERUM: CPT

## 2021-08-19 ASSESSMENT — ENCOUNTER SYMPTOMS
ABDOMINAL PAIN: 0
SHORTNESS OF BREATH: 0
RHINORRHEA: 0
WHEEZING: 0
DIARRHEA: 0
COUGH: 0
CONSTIPATION: 0
SORE THROAT: 1

## 2021-08-19 NOTE — PROGRESS NOTES
[address] [facility] [address] [facility] PRIMARY CARE  [address] [place] [id number]  Dept: [phone]  Dept Fax: [phone]  Loc: [phone]     Chief Complaint  Chief Complaint   Patient presents with    Dizziness     x2 weeks    Pharyngitis     x1 day        HPI:  61 y.o.male who presents for the following:      Dizziness: seen by ENT for this; told the ear looked good; started on medrol pack; dizziness still constant but improved from before    Sore throat: started yesterday with scratchy throat; no fevers; no n/v. No HA. Review of Systems   Constitutional: Negative for chills and fever. HENT: Positive for sore throat. Negative for congestion and rhinorrhea. Respiratory: Negative for cough, shortness of breath and wheezing. Gastrointestinal: Negative for abdominal pain, constipation and diarrhea. Endocrine: Negative for polydipsia and polyuria. Genitourinary: Negative for dysuria, frequency and urgency. Neurological: Positive for dizziness. Negative for syncope, light-headedness, numbness and headaches. Psychiatric/Behavioral: Negative for sleep disturbance. The patient is not nervous/anxious.         Past Medical History:   Diagnosis Date    Anxiety 7/14/2019    Hypertension     Situational depression 7/14/2019     Past Surgical History:   Procedure Laterality Date    APPENDECTOMY      KNEE SURGERY Right     interpartial     Social History     Socioeconomic History    Marital status:      Spouse name: Not on file    Number of children: Not on file    Years of education: Not on file    Highest education level: Not on file   Occupational History    Not on file   Tobacco Use    Smoking status: Never Smoker    Smokeless tobacco: Never Used   Vaping Use    Vaping Use: Never used   Substance and Sexual Activity    Alcohol use: No    Drug use: No    Sexual activity: Never   Other Topics Concern    Not on file Social History Narrative    Not on file     Social Determinants of Health     Financial Resource Strain: Low Risk     Difficulty of Paying Living Expenses: Not hard at all   Food Insecurity: No Food Insecurity    Worried About Running Out of Food in the Last Year: Never true    [address] in the Last Year: Never true   Transportation Needs:     Lack of Transportation (Medical):  Lack of Transportation (Non-Medical):    Physical Activity:     Days of Exercise per Week:     Minutes of Exercise per Session:    Stress:     Feeling of Stress :    Social Connections:     Frequency of Communication with Friends and Family:     Frequency of Social Gatherings with Friends and Family:     Attends Protestant Services:     Active Member of Clubs or Organizations:     Attends Club or Organization Meetings:     Marital Status:    Intimate Partner Violence:     Fear of Current or Ex-Partner:     Emotionally Abused:     Physically Abused:     Sexually Abused:      Family History   Problem Relation Age of Onset    Cancer Father         prostate    Other Father         blood clot - lung    Other Mother         scleroderma      Allergies   Allergen Reactions    Metoprolol Other (See Comments)     fatigue    Terazosin      Current Outpatient Medications   Medication Sig Dispense Refill    hydroCHLOROthiazide (HYDRODIURIL) 25 MG tablet TAKE 1 TABLET DAILY.  ondansetron (ZOFRAN-ODT) 4 MG disintegrating tablet Take 1 tablet by mouth 3 times daily as needed for Nausea or Vomiting 30 tablet 0    fluticasone (FLONASE) 50 MCG/ACT nasal spray 1 spray by Nasal route daily 1 Bottle 0    amLODIPine (NORVASC) 10 MG tablet TAKE 1 TABLET DAILY.       atorvastatin (LIPITOR) 40 MG tablet Take by mouth      clopidogrel (PLAVIX) 75 MG tablet Take by mouth      lisinopril-hydroCHLOROthiazide (PRINZIDE;ZESTORETIC) 20-25 MG per tablet Take by mouth      magnesium oxide (MAG-OX) 400 (241.3 Mg) MG TABS tablet take 1 tablet daily      nitroGLYCERIN (NITROSTAT) 0.4 MG SL tablet Place under the tongue      potassium chloride (KLOR-CON) 10 MEQ extended release tablet Take by mouth      ELIQUIS 5 MG TABS tablet TAKE 1 TABLET BY MOUTH EVERY 12 HOURS  3    metoprolol tartrate (LOPRESSOR) 50 MG tablet TAKE 1 TABLET TWICE DAILY  3     No current facility-administered medications for this visit. Vitals:    08/19/21 1554   BP: 130/82   Pulse: 65   Temp: 97.2 °F (36.2 °C)   TempSrc: Infrared   SpO2: 95%   Weight: 237 lb (107.5 kg)   Height: 6' 3\" (1.905 m)       Physical exam:  Physical Exam  Vitals reviewed. Constitutional:       General: He is not in acute distress. Appearance: He is well-developed. HENT:      Head: Normocephalic and atraumatic. Right Ear: Tympanic membrane, ear canal and external ear normal.      Left Ear: Tympanic membrane, ear canal and external ear normal.      Mouth/Throat:      Pharynx: No oropharyngeal exudate. Neck:      Thyroid: No thyromegaly. Cardiovascular:      Rate and Rhythm: Normal rate and regular rhythm. Heart sounds: Normal heart sounds. No murmur heard. Pulmonary:      Effort: Pulmonary effort is normal. No respiratory distress. Breath sounds: Normal breath sounds. No wheezing. Abdominal:      General: There is no distension. Palpations: Abdomen is soft. Tenderness: There is no abdominal tenderness. There is no guarding or rebound. Musculoskeletal:      Cervical back: Normal range of motion. Lymphadenopathy:      Cervical: No cervical adenopathy. Skin:     General: Skin is warm and dry. Neurological:      Mental Status: He is alert and oriented to person, place, and time.    Psychiatric:         Behavior: Behavior normal.         Assessment/Plan:  61 y.o. male here mainly for dizziness:  - Dizziness: improved; I suspect it will continue improving over time; he is interested in checking labs to see if something is contributing  - sore throat: benign exam; possibly early URI     Diagnosis Orders   1. Dizziness  CBC    TSH Without Reflex    Vitamin B12 & Folate   2. Sore throat          Return if symptoms worsen or fail to improve.     Jenny Estevez MD

## 2021-08-20 LAB
HCT VFR BLD CALC: 41.2 % (ref 42–52)
HEMOGLOBIN: 13.9 G/DL (ref 14–18)
MCH RBC QN AUTO: 30.5 PG (ref 27–31.3)
MCHC RBC AUTO-ENTMCNC: 33.6 % (ref 33–37)
MCV RBC AUTO: 90.7 FL (ref 80–100)
PDW BLD-RTO: 15 % (ref 11.5–14.5)
PLATELET # BLD: 74 K/UL (ref 130–400)
PLATELET SLIDE REVIEW: ABNORMAL
RBC # BLD: 4.54 M/UL (ref 4.7–6.1)
WBC # BLD: 3.1 K/UL (ref 4.8–10.8)

## 2021-12-26 DIAGNOSIS — R09.81 NASAL CONGESTION: ICD-10-CM

## 2021-12-27 NOTE — TELEPHONE ENCOUNTER
Comments:     Last Office Visit (last PCP visit):   2021    Next Visit Date:  No future appointments. **If hasn't been seen in over a year OR hasn't followed up according to last diabetes/ADHD visit, make appointment for patient before sending refill to provider.     Rx requested:  Requested Prescriptions     Pending Prescriptions Disp Refills    fluticasone (FLONASE) 50 MCG/ACT nasal spray [Pharmacy Med Name: fluticasone propionate 50 mcg/actuation nasal spray,suspension] 16 g 0     Si spray by Nasal route daily

## 2021-12-28 RX ORDER — FLUTICASONE PROPIONATE 50 MCG
1 SPRAY, SUSPENSION (ML) NASAL DAILY
Qty: 16 G | Refills: 0 | Status: SHIPPED | OUTPATIENT
Start: 2021-12-28 | End: 2022-07-27

## 2021-12-30 ENCOUNTER — VIRTUAL VISIT (OUTPATIENT)
Dept: FAMILY MEDICINE CLINIC | Age: 61
End: 2021-12-30
Payer: COMMERCIAL

## 2021-12-30 DIAGNOSIS — U07.1 TELEHEALTH ENCOUNTER FOR CONFIRMED COVID-19: Primary | ICD-10-CM

## 2021-12-30 LAB
Lab: ABNORMAL
PERFORMING INSTRUMENT: ABNORMAL
QC PASS/FAIL: ABNORMAL
SARS-COV-2, POC: DETECTED

## 2021-12-30 PROCEDURE — 87426 SARSCOV CORONAVIRUS AG IA: CPT | Performed by: PHYSICIAN ASSISTANT

## 2021-12-30 PROCEDURE — 99213 OFFICE O/P EST LOW 20 MIN: CPT | Performed by: PHYSICIAN ASSISTANT

## 2021-12-30 ASSESSMENT — ENCOUNTER SYMPTOMS
BACK PAIN: 0
NAUSEA: 0
SINUS PAIN: 0
DIARRHEA: 0
ABDOMINAL PAIN: 0
CHEST TIGHTNESS: 0
SHORTNESS OF BREATH: 0
SORE THROAT: 1
COUGH: 1
VOMITING: 0
SINUS PRESSURE: 0

## 2021-12-30 NOTE — PROGRESS NOTES
2021    TELEHEALTH EVALUATION -- Audio/Visual (During [place] [address] public health emergency)    Due to COVID 19 outbreak, patient's office visit was converted to a virtual visit. Patient was contacted and agreed to proceed with a virtual visit via [email or web address] me  The risks and benefits of converting to a virtual visit were discussed in light of the current infectious disease epidemic. Patient also understood that insurance coverage and co-pays are up to their individual insurance plans. HPI:    Ruben Gonzáles (:  1960) has requested an audio/video evaluation for the following concern(s):    The patient is presenting today with CC of COVID-19 exposure over the holidays. Patient started developing symptoms on 21    Cough - productive cough. Sore throat  HA  Bodyache    No SOB, chest pain, nausea, vomiting, and no chest pain. Review of Systems   Constitutional: Positive for fatigue. Negative for activity change, appetite change, chills and fever. HENT: Positive for congestion and sore throat. Negative for drooling, sinus pressure and sinus pain. Eyes: Negative for visual disturbance. Respiratory: Positive for cough. Negative for chest tightness and shortness of breath. Cardiovascular: Negative for chest pain. Gastrointestinal: Negative for abdominal pain, diarrhea, nausea and vomiting. Endocrine: Negative for cold intolerance. Genitourinary: Negative for dysuria, flank pain, frequency and hematuria. Musculoskeletal: Positive for myalgias. Negative for arthralgias and back pain. Skin: Negative for rash. Allergic/Immunologic: Negative for food allergies. Neurological: Positive for headaches. Negative for weakness, light-headedness and numbness. Hematological: Does not bruise/bleed easily. Prior to Visit Medications    Medication Sig Taking?  Authorizing Provider   fluticasone (FLONASE) 50 MCG/ACT nasal spray 1 spray by Nasal route daily Yes ERON Riggs hydroCHLOROthiazide (HYDRODIURIL) 25 MG tablet TAKE 1 TABLET DAILY. Yes Historical Provider, MD   ondansetron (ZOFRAN-ODT) 4 MG disintegrating tablet Take 1 tablet by mouth 3 times daily as needed for Nausea or Vomiting Yes Morris Winslow MD   amLODIPine (NORVASC) 10 MG tablet TAKE 1 TABLET DAILY. Yes Historical Provider, MD   atorvastatin (LIPITOR) 40 MG tablet Take by mouth Yes Historical Provider, MD   clopidogrel (PLAVIX) 75 MG tablet Take by mouth Yes Historical Provider, MD   lisinopril-hydroCHLOROthiazide (PRINZIDE;ZESTORETIC) 20-25 MG per tablet Take by mouth Yes Historical Provider, MD   magnesium oxide (MAG-OX) 400 (241.3 Mg) MG TABS tablet take 1 tablet daily Yes Historical Provider, MD   nitroGLYCERIN (NITROSTAT) 0.4 MG SL tablet Place under the tongue Yes Historical Provider, MD   potassium chloride (KLOR-CON) 10 MEQ extended release tablet Take by mouth Yes Historical Provider, MD   ELIQUIS 5 MG TABS tablet TAKE 1 TABLET BY MOUTH EVERY 12 HOURS Yes Historical Provider, MD   metoprolol tartrate (LOPRESSOR) 50 MG tablet TAKE 1 TABLET TWICE DAILY Yes Historical Provider, MD       Social History     Tobacco Use    Smoking status: Never Smoker    Smokeless tobacco: Never Used   Vaping Use    Vaping Use: Never used   Substance Use Topics    Alcohol use: No    Drug use: No            PHYSICAL EXAMINATION:  [ INSTRUCTIONS:  \"[x]\" Indicates a positive item  \"[]\" Indicates a negative item  -- DELETE ALL ITEMS NOT EXAMINED]  [x] Alert  [x] Oriented to person/place/time    [x] No apparent distress   [x] Breathing appears normal   [x] Cranial Nerves II-XII grossly intact    [x] Motor grossly intact in visible upper extremities    [x] Motor grossly intact in visible lower extremities  [x] Normal Mood      [] OTHER:        Due to this being a TeleHealth encounter, evaluation of the following organ systems is limited: Vitals/Constitutional/EENT/Resp/CV/GI//MS/Neuro/Skin/Heme-Lymph-Imm. ASSESSMENT/PLAN:  1. Telehealth encounter for confirmed COVID-19  - Supportive care-motrin, sudafed, humidifer, antihistamine, flonase, warm salt water gargles, honey with tea  - Recommend Mucinex DM for cough and congestion. Tylenol motrin for body aches. - Discussed signs and symptoms which require immediate follow-up in ED/call to 911. Patient verbalized understanding. No follow-ups on file. An  electronic signature was used to authenticate this note. --ERON Mancuso on 12/30/2021 at 2:18 PM        Pursuant to the emergency declaration under the [address] [place] [address], [address] [place] [address] authority and the [employer] Act, this Virtual  Visit was conducted, with patient's consent, to reduce the patient's risk of exposure to COVID-19 and provide continuity of care for an established patient. Services were provided through a video synchronous discussion virtually to substitute for in-person clinic visit.

## 2022-01-03 ENCOUNTER — VIRTUAL VISIT (OUTPATIENT)
Dept: INTERNAL MEDICINE | Age: 62
End: 2022-01-03
Payer: COMMERCIAL

## 2022-01-03 DIAGNOSIS — U07.1 COVID-19 VIRUS INFECTION: Primary | ICD-10-CM

## 2022-01-03 PROCEDURE — 99442 PR PHYS/QHP TELEPHONE EVALUATION 11-20 MIN: CPT | Performed by: PHYSICIAN ASSISTANT

## 2022-01-03 RX ORDER — AZITHROMYCIN 250 MG/1
TABLET, FILM COATED ORAL
Qty: 6 TABLET | Refills: 0 | Status: SHIPPED | OUTPATIENT
Start: 2022-01-03 | End: 2022-02-04

## 2022-01-03 RX ORDER — METHYLPREDNISOLONE 4 MG/1
TABLET ORAL
Qty: 1 KIT | Refills: 0 | Status: SHIPPED | OUTPATIENT
Start: 2022-01-03 | End: 2022-01-09

## 2022-01-03 ASSESSMENT — PATIENT HEALTH QUESTIONNAIRE - PHQ9
SUM OF ALL RESPONSES TO PHQ QUESTIONS 1-9: 0
2. FEELING DOWN, DEPRESSED OR HOPELESS: 0
1. LITTLE INTEREST OR PLEASURE IN DOING THINGS: 0
SUM OF ALL RESPONSES TO PHQ QUESTIONS 1-9: 0
SUM OF ALL RESPONSES TO PHQ9 QUESTIONS 1 & 2: 0
SUM OF ALL RESPONSES TO PHQ QUESTIONS 1-9: 0
SUM OF ALL RESPONSES TO PHQ QUESTIONS 1-9: 0

## 2022-01-03 ASSESSMENT — ENCOUNTER SYMPTOMS
COUGH: 1
SHORTNESS OF BREATH: 0
WHEEZING: 0
GASTROINTESTINAL NEGATIVE: 1
CHEST TIGHTNESS: 0

## 2022-01-03 NOTE — PROGRESS NOTES
Kiet Walter (: 1960) is a 64 y.o. male, Established patient, here for evaluation of the following chief complaint(s): Other (would like to discuss covid symptoms and meds to help)        ASSESSMENT/PLAN:  1. COVID-19 virus infection  - increase fluids   - azithromycin (ZITHROMAX) 250 MG tablet; 500mg on day 1 followed by 250mg on days 2 - 5  Dispense: 6 tablet; Refill: 0  - methylPREDNISolone (MEDROL DOSEPACK) 4 MG tablet; Take by mouth. Dispense: 1 kit; Refill: 0  - Self quarantine  - OTC symptom control  - Continue to wear mask, social distance, and wash hands frequently  - Call 911 or go to the ER should symptoms become difficult to manage  - can return to work/school if asymptomatic after day 5, wearing a mask for 5 more days when out in public       No follow-ups on file. SUBJECTIVE/OBJECTIVE:  HPI      Covid infection  Tested positive 4 days   Symptoms- weak , productive cough , achy , dizzy when standing   He does have the covid vaccine       Review of Systems   Constitutional: Positive for fatigue. Negative for appetite change, chills and fever. HENT: Negative. Respiratory: Positive for cough. Negative for chest tightness, shortness of breath and wheezing. Gastrointestinal: Negative. Neurological: Positive for weakness. Patient-Reported Vitals 2021   Patient-Reported Weight 238 lbs   Patient-Reported Height 6\"2         Physical Exam  Pulmonary:      Effort: Pulmonary effort is normal.   Neurological:      Mental Status: He is alert and oriented to person, place, and time. Psychiatric:         Mood and Affect: Mood normal.         Behavior: Behavior normal.         Thought Content: Thought content normal.         Judgment: Judgment normal.         There were no vitals filed for this visit. Kiet Walter is a 64 y.o. male being evaluated by a Virtual Visit (video visit) encounter to address concerns as mentioned above. A caregiver was present when appropriate.  Due to this being a TeleHealth encounter (During [place] public health emergency), evaluation of the following organ systems was limited: Vitals/Constitutional/EENT/Resp/CV/GI//MS/Neuro/Skin/Heme-Lymph-Imm. Pursuant to the emergency declaration under the [address] [place] [address] and the [employer] and [employer] Act, this Virtual Visit was conducted with patient's (and/or legal guardian's) consent, to reduce the patient's risk of exposure to COVID-19 and provide necessary medical care. The patient (and/or legal guardian) has also been advised to contact this office for worsening conditions or problems, and seek emergency medical treatment and/or call 911 if deemed necessary. Patient identification was verified at the start of the visit: Yes    Services were provided through a video synchronous discussion virtually to substitute for in-person clinic visit. Patient was located at home and provider was located in office or at home. An electronic signature was used to authenticate this note.     --ERON Slade

## 2022-02-04 ENCOUNTER — OFFICE VISIT (OUTPATIENT)
Dept: INTERNAL MEDICINE | Age: 62
End: 2022-02-04
Payer: COMMERCIAL

## 2022-02-04 VITALS
WEIGHT: 251 LBS | SYSTOLIC BLOOD PRESSURE: 100 MMHG | BODY MASS INDEX: 31.37 KG/M2 | OXYGEN SATURATION: 98 % | HEART RATE: 60 BPM | DIASTOLIC BLOOD PRESSURE: 80 MMHG

## 2022-02-04 DIAGNOSIS — R10.84 ABDOMINAL PAIN, GENERALIZED: Primary | ICD-10-CM

## 2022-02-04 DIAGNOSIS — K52.9 POSTPRANDIAL DIARRHEA: ICD-10-CM

## 2022-02-04 DIAGNOSIS — I48.91 ATRIAL FIBRILLATION, TRANSIENT (HCC): ICD-10-CM

## 2022-02-04 DIAGNOSIS — R60.9 EDEMA, PERIPHERAL: ICD-10-CM

## 2022-02-04 DIAGNOSIS — R06.02 SOB (SHORTNESS OF BREATH): ICD-10-CM

## 2022-02-04 PROBLEM — R09.81 NASAL CONGESTION: Status: RESOLVED | Noted: 2021-07-20 | Resolved: 2022-02-04

## 2022-02-04 PROCEDURE — 93000 ELECTROCARDIOGRAM COMPLETE: CPT | Performed by: PHYSICIAN ASSISTANT

## 2022-02-04 PROCEDURE — 99214 OFFICE O/P EST MOD 30 MIN: CPT | Performed by: PHYSICIAN ASSISTANT

## 2022-02-04 RX ORDER — ASPIRIN 81 MG/1
81 TABLET ORAL DAILY
COMMUNITY
End: 2022-06-03

## 2022-02-04 RX ORDER — HYDROXYZINE PAMOATE 25 MG/1
25 CAPSULE ORAL 3 TIMES DAILY PRN
COMMUNITY
End: 2022-06-03

## 2022-02-04 ASSESSMENT — ENCOUNTER SYMPTOMS
BLOOD IN STOOL: 0
CONSTIPATION: 0
ABDOMINAL PAIN: 1
RECTAL PAIN: 0
DIARRHEA: 1
NAUSEA: 0
VOMITING: 0

## 2022-02-04 NOTE — PROGRESS NOTES
Anthony Jarvis (: 1960) is a 64 y.o. male, Established patient, here for evaluation of the following chief complaint(s):  Abdominal Pain (x 2 weeks, everything he eats causes diarrhea. tried otc meds. pain described as someone punching and twisting stomach. pt also has dry mouth, wondered if due to meds. )        ASSESSMENT/PLAN:  1. Abdominal pain, generalized  2. Postprandial diarrhea  - CT ABDOMEN PELVIS WO CONTRAST Additional Contrast? None; Future  - will get CT abd     3. Atrial fibrillation, transient ([address]  4. Edema, peripheral  5. SOB (shortness of breath)  - EKG 12 lead; Future- showed a fib today   - pt is on beta blocker and Eliquis   - I called [facility] today, and the patient will see the cardiologist today   - pt decline the ER          No follow-ups on file. SUBJECTIVE/OBJECTIVE:  Abdominal Pain  This is a new problem. Episode onset: 2 weeks. The onset quality is sudden. The problem occurs constantly. Associated symptoms include diarrhea. Pertinent negatives include no constipation, nausea or vomiting. diarrhea within 10 minutes  after eating   He is staying hydrated  States dry mouth       New onset pedal edema  States he is getting SOB when bending over   Denies CP     Review of Systems   Constitutional: Negative. Gastrointestinal: Positive for abdominal pain and diarrhea. Negative for blood in stool, constipation, nausea, rectal pain and vomiting. Physical Exam  Vitals reviewed. Constitutional:       Appearance: Normal appearance. HENT:      Head: Normocephalic and atraumatic. Cardiovascular:      Rate and Rhythm: Normal rate. Rhythm irregular. Pulses: Normal pulses. Heart sounds: Normal heart sounds. Pulmonary:      Effort: Pulmonary effort is normal.      Breath sounds: Normal breath sounds. Abdominal:      General: Bowel sounds are normal.      Tenderness: There is abdominal tenderness. Skin:     General: Skin is warm.    Neurological:      Mental Status: He is alert. Vitals:    02/04/22 1132   BP: 100/80   Pulse: 60   SpO2: 98%   Weight: 251 lb (113.9 kg)                 An electronic signature was used to authenticate this note.     --ERON Gonzalez

## 2022-02-11 ENCOUNTER — HOSPITAL ENCOUNTER (OUTPATIENT)
Dept: CT IMAGING | Age: 62
Discharge: HOME | End: 2022-02-13
Payer: COMMERCIAL

## 2022-02-11 DIAGNOSIS — K52.9 POSTPRANDIAL DIARRHEA: ICD-10-CM

## 2022-02-11 DIAGNOSIS — R10.84 ABDOMINAL PAIN, GENERALIZED: ICD-10-CM

## 2022-02-11 PROCEDURE — 74176 CT ABD & PELVIS W/O CONTRAST: CPT

## 2022-02-14 ENCOUNTER — TELEPHONE (OUTPATIENT)
Dept: INTERNAL MEDICINE | Age: 62
End: 2022-02-14

## 2022-02-17 NOTE — TELEPHONE ENCOUNTER
I called pt today. He is aware of results. He'd like to know what his next steps are. Pt stated still whatever he eats \"it goes right through\" him.     Thank you

## 2022-02-18 NOTE — TELEPHONE ENCOUNTER
Ask the patient to start on a fiber supplement, then call me for referal if not improved by next Friday

## 2022-02-25 ENCOUNTER — OFFICE VISIT (OUTPATIENT)
Dept: INTERNAL MEDICINE | Age: 62
End: 2022-02-25
Payer: COMMERCIAL

## 2022-02-25 VITALS
DIASTOLIC BLOOD PRESSURE: 70 MMHG | BODY MASS INDEX: 33.53 KG/M2 | HEART RATE: 64 BPM | SYSTOLIC BLOOD PRESSURE: 110 MMHG | HEIGHT: 73 IN | WEIGHT: 253 LBS | OXYGEN SATURATION: 96 % | TEMPERATURE: 97 F

## 2022-02-25 DIAGNOSIS — U09.9 POST-COVID CHRONIC COUGH: Primary | ICD-10-CM

## 2022-02-25 DIAGNOSIS — R05.3 POST-COVID CHRONIC COUGH: Primary | ICD-10-CM

## 2022-02-25 PROCEDURE — 99213 OFFICE O/P EST LOW 20 MIN: CPT | Performed by: PHYSICIAN ASSISTANT

## 2022-02-25 RX ORDER — METHYLPREDNISOLONE 4 MG/1
TABLET ORAL
Qty: 1 KIT | Refills: 0 | Status: SHIPPED | OUTPATIENT
Start: 2022-02-25 | End: 2022-03-03

## 2022-02-25 RX ORDER — BENZONATATE 100 MG/1
100 CAPSULE ORAL EVERY 6 HOURS PRN
Qty: 30 CAPSULE | Refills: 0 | Status: SHIPPED | OUTPATIENT
Start: 2022-02-25 | End: 2022-03-04

## 2022-02-25 ASSESSMENT — ENCOUNTER SYMPTOMS
SHORTNESS OF BREATH: 0
COUGH: 1
CHEST TIGHTNESS: 0

## 2022-02-25 NOTE — PROGRESS NOTES
Nichole Roth (: 1960) is a 64 y.o. male, Established patient, here for evaluation of the following chief complaint(s):  Cough (Pt states his cough keeps getting worse. Pt states laying down makes the cough worse. Pt states he was in the hospital last tuesday to get his heart shocked. Pt states his heart is currently in a fib and is going back on Monday. Pt states he was tested positive for covid on 2/15.  )        ASSESSMENT/PLAN:  1. Post-COVID chronic cough  - methylPREDNISolone (MEDROL DOSEPACK) 4 MG tablet; Take by mouth. Dispense: 1 kit; Refill: 0  - benzonatate (TESSALON PERLES) 100 MG capsule; Take 1 capsule by mouth every 6 hours as needed for Cough  Dispense: 30 capsule; Refill: 0  - rest and fluids       No follow-ups on file. SUBJECTIVE/OBJECTIVE:  HPI      Covid positive, 10 days ago  Now having coughing   No SOB or fever       Review of Systems   Constitutional: Negative. HENT: Negative. Respiratory: Positive for cough. Negative for chest tightness and shortness of breath. Patient-Reported Vitals 2021   Patient-Reported Weight 238 lbs   Patient-Reported Height 6\"2         Physical Exam  Constitutional:       Appearance: Normal appearance. HENT:      Head: Normocephalic. Cardiovascular:      Rate and Rhythm: Rhythm irregular. Heart sounds: Normal heart sounds. Pulmonary:      Effort: Pulmonary effort is normal. No respiratory distress. Breath sounds: Normal breath sounds. No wheezing or rhonchi. Neurological:      Mental Status: He is alert. Vitals:    22 0910   BP: 110/70   Site: Left Upper Arm   Position: Sitting   Cuff Size: Large Adult   Pulse: 64   Temp: 97 °F (36.1 °C)   TempSrc: Temporal   SpO2: 96%   Weight: 253 lb (114.8 kg)   Height: 6' 1\" (1.854 m)             Nichole Roth is a 64 y.o. male being evaluated by a Virtual Visit (video visit) encounter to address concerns as mentioned above. A caregiver was present when appropriate.  Due to this being a TeleHealth encounter (During EVPOP-41 public health emergency), evaluation of the following organ systems was limited: Vitals/Constitutional/EENT/Resp/CV/GI//MS/Neuro/Skin/Heme-Lymph-Imm. Pursuant to the emergency declaration under the [address] [place] [address] and the [employer] Act, this Virtual Visit was conducted with patient's (and/or legal guardian's) consent, to reduce the patient's risk of exposure to COVID-19 and provide necessary medical care. The patient (and/or legal guardian) has also been advised to contact this office for worsening conditions or problems, and seek emergency medical treatment and/or call 911 if deemed necessary. Patient identification was verified at the start of the visit: Yes    Services were provided through a video synchronous discussion virtually to substitute for in-person clinic visit. Patient was located at home and provider was located in office or at home. An electronic signature was used to authenticate this note.     --ERON Shin

## 2022-06-03 ENCOUNTER — OFFICE VISIT (OUTPATIENT)
Dept: INTERNAL MEDICINE | Age: 62
End: 2022-06-03
Payer: COMMERCIAL

## 2022-06-03 VITALS
SYSTOLIC BLOOD PRESSURE: 128 MMHG | HEART RATE: 64 BPM | OXYGEN SATURATION: 97 % | DIASTOLIC BLOOD PRESSURE: 80 MMHG | BODY MASS INDEX: 31.44 KG/M2 | HEIGHT: 74 IN | WEIGHT: 245 LBS | TEMPERATURE: 97.1 F

## 2022-06-03 DIAGNOSIS — H66.002 NON-RECURRENT ACUTE SUPPURATIVE OTITIS MEDIA OF LEFT EAR WITHOUT SPONTANEOUS RUPTURE OF TYMPANIC MEMBRANE: Primary | ICD-10-CM

## 2022-06-03 PROCEDURE — 99213 OFFICE O/P EST LOW 20 MIN: CPT | Performed by: NURSE PRACTITIONER

## 2022-06-03 PROCEDURE — G8417 CALC BMI ABV UP PARAM F/U: HCPCS | Performed by: NURSE PRACTITIONER

## 2022-06-03 PROCEDURE — 1036F TOBACCO NON-USER: CPT | Performed by: NURSE PRACTITIONER

## 2022-06-03 PROCEDURE — 3017F COLORECTAL CA SCREEN DOC REV: CPT | Performed by: NURSE PRACTITIONER

## 2022-06-03 PROCEDURE — G8427 DOCREV CUR MEDS BY ELIG CLIN: HCPCS | Performed by: NURSE PRACTITIONER

## 2022-06-03 RX ORDER — AMOXICILLIN AND CLAVULANATE POTASSIUM 875; 125 MG/1; MG/1
1 TABLET, FILM COATED ORAL 2 TIMES DAILY
Qty: 20 TABLET | Refills: 0 | Status: SHIPPED | OUTPATIENT
Start: 2022-06-03 | End: 2022-06-13

## 2022-06-03 ASSESSMENT — ENCOUNTER SYMPTOMS
DIARRHEA: 0
CHEST TIGHTNESS: 0
SHORTNESS OF BREATH: 0
SORE THROAT: 0
COUGH: 0
ABDOMINAL PAIN: 0
BACK PAIN: 0
WHEEZING: 0
VOMITING: 0
NAUSEA: 0
RHINORRHEA: 0

## 2022-06-03 NOTE — PROGRESS NOTES
Jose Garcia (:  1960) is a 64 y.o. male, Established patient, here for evaluation of the following chief complaint(s): Other (left ear pain draining 3-4 days)      Vitals:    22 1006   BP: 128/80   Pulse: 64   Temp: 97.1 °F (36.2 °C)   SpO2: 97%       ASSESSMENT/PLAN:  1. Non-recurrent acute suppurative otitis media of left ear without spontaneous rupture of tympanic membrane  -     amoxicillin-clavulanate (AUGMENTIN) 875-125 MG per tablet; Take 1 tablet by mouth 2 times daily for 10 days, Disp-20 tablet, R-0Normal  -     [facility] - Yohannes Orozco MD, Otolaryngology, [address] [place] [address] I-20 water in the left ear        -    Tylenol or motrin as needed      Return if symptoms worsen or fail to improve. SUBJECTIVE/OBJECTIVE:    Otalgia   There is pain in the left ear. This is a new problem. Episode onset: x3-4 days, left ear pain,  hx of left ear infections. The problem occurs constantly. The problem has been gradually worsening. There has been no fever. The pain is at a severity of 3/10. The pain is mild. Pertinent negatives include no abdominal pain, coughing, diarrhea, headaches, rhinorrhea, sore throat or vomiting. He has tried nothing for the symptoms. Review of Systems   Constitutional: Negative for chills, fatigue and fever. HENT: Positive for ear pain (left). Negative for congestion, rhinorrhea and sore throat. Respiratory: Negative for cough, chest tightness, shortness of breath and wheezing. Cardiovascular: Negative for chest pain, palpitations and leg swelling. Gastrointestinal: Negative for abdominal pain, diarrhea, nausea and vomiting. Musculoskeletal: Negative for arthralgias, back pain and myalgias. Neurological: Negative for dizziness, light-headedness and headaches. Physical Exam  Vitals reviewed. Constitutional:       General: He is not in acute distress. Appearance: He is well-developed. HENT:      Right Ear: A middle ear effusion is present. Tympanic membrane is not erythematous. Left Ear: Drainage (white/yellow in color) present. A middle ear effusion is present. Tympanic membrane is erythematous. Ears:      Comments: No tenderness to the left ear with gentle manipulation of the pinna or tragus     Nose: Nose normal.      Mouth/Throat:      Lips: Pink. Mouth: Mucous membranes are moist.      Pharynx: Oropharynx is clear. Eyes:      General: Lids are normal.      Extraocular Movements: Extraocular movements intact. Conjunctiva/sclera: Conjunctivae normal.      Pupils: Pupils are equal, round, and reactive to light. Cardiovascular:      Rate and Rhythm: Normal rate and regular rhythm. Heart sounds: Normal heart sounds, S1 normal and S2 normal.   Pulmonary:      Effort: Pulmonary effort is normal. No respiratory distress. Breath sounds: Normal air entry. No decreased breath sounds, wheezing, rhonchi or rales. Abdominal:      General: Bowel sounds are normal.      Palpations: Abdomen is soft. Musculoskeletal:         General: No deformity. Normal range of motion. Cervical back: Full passive range of motion without pain. Skin:     General: Skin is warm and dry. Neurological:      Mental Status: He is alert and oriented to person, place, and time. Psychiatric:         Attention and Perception: Attention normal.         Behavior: Behavior is cooperative. An electronic signature was used to authenticate this note.     --NICK Forde

## 2022-06-03 NOTE — PATIENT INSTRUCTIONS
sure to contact your doctor if:     You have new or worse symptoms.      You are not getting better after taking an antibiotic for 2 days. Where can you learn more? Go to [email or web address] and sign in to your [employer] account. Enter X037 in the [facility] box to learn more about \"Ear Infection (Otitis Media): Care Instructions. \"     If you do not have an account, please click on the \"Sign Up Now\" link. Current as of: September 8, 2021               Content Version: 13.2  © 2890-7936 [employer]wise, Incorporated. Care instructions adapted under license by [facility] ([facility]). If you have questions about a medical condition or this instruction, always ask your healthcare professional. [address] any warranty or liability for your use of this information.

## 2022-07-27 ENCOUNTER — OFFICE VISIT (OUTPATIENT)
Dept: INTERNAL MEDICINE | Age: 62
End: 2022-07-27
Payer: COMMERCIAL

## 2022-07-27 VITALS
HEART RATE: 85 BPM | TEMPERATURE: 97.6 F | SYSTOLIC BLOOD PRESSURE: 108 MMHG | WEIGHT: 245 LBS | DIASTOLIC BLOOD PRESSURE: 60 MMHG | HEIGHT: 74 IN | OXYGEN SATURATION: 92 % | RESPIRATION RATE: 16 BRPM | BODY MASS INDEX: 31.44 KG/M2

## 2022-07-27 DIAGNOSIS — Z20.822 ENCOUNTER FOR SCREENING LABORATORY TESTING FOR COVID-19 VIRUS: Primary | ICD-10-CM

## 2022-07-27 DIAGNOSIS — R06.02 SHORTNESS OF BREATH: ICD-10-CM

## 2022-07-27 DIAGNOSIS — I49.9 IRREGULAR HEART RHYTHM: ICD-10-CM

## 2022-07-27 PROBLEM — H66.002 ACUTE SUPPURATIVE OTITIS MEDIA OF LEFT EAR WITHOUT SPONTANEOUS RUPTURE OF TYMPANIC MEMBRANE: Status: RESOLVED | Noted: 2021-07-20 | Resolved: 2022-07-27

## 2022-07-27 PROBLEM — E78.2 MIXED HYPERLIPIDEMIA: Status: ACTIVE | Noted: 2022-07-27

## 2022-07-27 PROBLEM — I73.9 PERIPHERAL ARTERIAL DISEASE (HCC): Status: ACTIVE | Noted: 2022-07-27

## 2022-07-27 PROBLEM — I48.19 OTHER PERSISTENT ATRIAL FIBRILLATION (HCC): Status: ACTIVE | Noted: 2022-07-27

## 2022-07-27 PROBLEM — I21.4 NSTEMI, INITIAL EPISODE OF CARE (HCC): Status: ACTIVE | Noted: 2021-02-01

## 2022-07-27 LAB
Lab: NORMAL
PERFORMING INSTRUMENT: NORMAL
QC PASS/FAIL: NORMAL
SARS-COV-2, POC: NORMAL

## 2022-07-27 PROCEDURE — G8417 CALC BMI ABV UP PARAM F/U: HCPCS | Performed by: NURSE PRACTITIONER

## 2022-07-27 PROCEDURE — 3017F COLORECTAL CA SCREEN DOC REV: CPT | Performed by: NURSE PRACTITIONER

## 2022-07-27 PROCEDURE — 87426 SARSCOV CORONAVIRUS AG IA: CPT | Performed by: NURSE PRACTITIONER

## 2022-07-27 PROCEDURE — G8427 DOCREV CUR MEDS BY ELIG CLIN: HCPCS | Performed by: NURSE PRACTITIONER

## 2022-07-27 PROCEDURE — 1036F TOBACCO NON-USER: CPT | Performed by: NURSE PRACTITIONER

## 2022-07-27 PROCEDURE — 99213 OFFICE O/P EST LOW 20 MIN: CPT | Performed by: NURSE PRACTITIONER

## 2022-07-27 RX ORDER — DOFETILIDE 0.12 MG/1
125 CAPSULE ORAL 2 TIMES DAILY
COMMUNITY

## 2022-07-27 SDOH — ECONOMIC STABILITY: FOOD INSECURITY: WITHIN THE PAST 12 MONTHS, YOU WORRIED THAT YOUR FOOD WOULD RUN OUT BEFORE YOU GOT MONEY TO BUY MORE.: NEVER TRUE

## 2022-07-27 SDOH — ECONOMIC STABILITY: FOOD INSECURITY: WITHIN THE PAST 12 MONTHS, THE FOOD YOU BOUGHT JUST DIDN'T LAST AND YOU DIDN'T HAVE MONEY TO GET MORE.: NEVER TRUE

## 2022-07-27 ASSESSMENT — COLUMBIA-SUICIDE SEVERITY RATING SCALE - C-SSRS
6. HAVE YOU EVER DONE ANYTHING, STARTED TO DO ANYTHING, OR PREPARED TO DO ANYTHING TO END YOUR LIFE?: NO
2. HAVE YOU ACTUALLY HAD ANY THOUGHTS OF KILLING YOURSELF?: NO
1. WITHIN THE PAST MONTH, HAVE YOU WISHED YOU WERE DEAD OR WISHED YOU COULD GO TO SLEEP AND NOT WAKE UP?: NO

## 2022-07-27 ASSESSMENT — ENCOUNTER SYMPTOMS
COUGH: 0
ABDOMINAL PAIN: 0
WHEEZING: 0
SHORTNESS OF BREATH: 1

## 2022-07-27 ASSESSMENT — PATIENT HEALTH QUESTIONNAIRE - PHQ9
9. THOUGHTS THAT YOU WOULD BE BETTER OFF DEAD, OR OF HURTING YOURSELF: 0
SUM OF ALL RESPONSES TO PHQ9 QUESTIONS 1 & 2: 0
8. MOVING OR SPEAKING SO SLOWLY THAT OTHER PEOPLE COULD HAVE NOTICED. OR THE OPPOSITE, BEING SO FIGETY OR RESTLESS THAT YOU HAVE BEEN MOVING AROUND A LOT MORE THAN USUAL: 0
SUM OF ALL RESPONSES TO PHQ QUESTIONS 1-9: 3
2. FEELING DOWN, DEPRESSED OR HOPELESS: 0
1. LITTLE INTEREST OR PLEASURE IN DOING THINGS: 0
3. TROUBLE FALLING OR STAYING ASLEEP: 1
10. IF YOU CHECKED OFF ANY PROBLEMS, HOW DIFFICULT HAVE THESE PROBLEMS MADE IT FOR YOU TO DO YOUR WORK, TAKE CARE OF THINGS AT HOME, OR GET ALONG WITH OTHER PEOPLE: 0
5. POOR APPETITE OR OVEREATING: 1
SUM OF ALL RESPONSES TO PHQ QUESTIONS 1-9: 3
6. FEELING BAD ABOUT YOURSELF - OR THAT YOU ARE A FAILURE OR HAVE LET YOURSELF OR YOUR FAMILY DOWN: 0
SUM OF ALL RESPONSES TO PHQ QUESTIONS 1-9: 3
7. TROUBLE CONCENTRATING ON THINGS, SUCH AS READING THE NEWSPAPER OR WATCHING TELEVISION: 0
4. FEELING TIRED OR HAVING LITTLE ENERGY: 1
SUM OF ALL RESPONSES TO PHQ QUESTIONS 1-9: 3

## 2022-07-27 ASSESSMENT — SOCIAL DETERMINANTS OF HEALTH (SDOH): HOW HARD IS IT FOR YOU TO PAY FOR THE VERY BASICS LIKE FOOD, HOUSING, MEDICAL CARE, AND HEATING?: NOT HARD AT ALL

## 2022-07-27 NOTE — PROGRESS NOTES
Subjective:      Patient ID: Sylvia Ray is a 64 y.o. male who presents today for:  Chief Complaint   Patient presents with    Shortness of Breath     since yesterday afternoon, feels hot, hx of afib, seeing cardiologist tomorrow, but cardiologist wants him to have a COVID test today       Patient presents to the office for covid testing as recommended by his cardiologist. Patient has an appointment with Cardiology tomorrow for shortness of breath and concerns for atrial fibrillation. Shortness of Breath  This is a new problem. The current episode started yesterday. The problem occurs constantly. The problem has been unchanged. Pertinent negatives include no abdominal pain, chest pain, ear pain, fever, headaches, leg pain, leg swelling, syncope or wheezing. Nothing aggravates the symptoms. Associated symptoms comments: Feels \"hot\". He has tried nothing for the symptoms. His past medical history is significant for CAD. Past Medical History:   Diagnosis Date    Anxiety 7/14/2019    Hypertension     Mixed hyperlipidemia 7/27/2022    Situational depression 7/14/2019     Past Surgical History:   Procedure Laterality Date    APPENDECTOMY      KNEE SURGERY Right     interpartial     Family History   Problem Relation Age of Onset    Cancer Father         prostate    Other Father         blood clot - lung    Other Mother         scleroderma     Allergies   Allergen Reactions    Oxycodone     Terazosin          Review of Systems   Constitutional:  Negative for chills, fatigue and fever. HENT:  Negative for congestion and ear pain. Respiratory:  Positive for shortness of breath. Negative for cough and wheezing. Cardiovascular:  Negative for chest pain, leg swelling and syncope. Gastrointestinal:  Negative for abdominal pain. Musculoskeletal:  Negative for myalgias. Neurological:  Negative for headaches.      Objective:   /60 (Site: Left Upper Arm, Position: Sitting, Cuff Size: Medium Adult)   Pulse 85   Temp 97.6 °F (36.4 °C) (Oral)   Resp 16   Ht 6' 2\" (1.88 m)   Wt 245 lb (111.1 kg)   SpO2 92%   BMI 31.46 kg/m²     Physical Exam  Vitals reviewed. Constitutional:       General: He is not in acute distress. Appearance: He is well-developed. HENT:      Head: Normocephalic. Cardiovascular:      Rate and Rhythm: Normal rate. Rhythm irregular. Pulses: Normal pulses. Heart sounds: Normal heart sounds. Pulmonary:      Effort: Pulmonary effort is normal. No respiratory distress. Breath sounds: Normal breath sounds. Musculoskeletal:         General: Normal range of motion. Skin:     General: Skin is warm and dry. Capillary Refill: Capillary refill takes less than 2 seconds. Neurological:      Mental Status: He is alert and oriented to person, place, and time. Assessment:       Diagnosis Orders   1. Encounter for screening laboratory testing for COVID-19 virus  POCT COVID-19, Antigen      2. Shortness of breath        3. Irregular heart rhythm              Plan:      Orders Placed This Encounter   Procedures    POCT COVID-19, Antigen     Order Specific Question:   Is this test for diagnosis or screening? Answer:   Diagnosis of ill patient     Order Specific Question:   Symptomatic for COVID-19 as defined by CDC? Answer:   Yes     Order Specific Question:   Date of Symptom Onset     Answer:   7/26/2022     Order Specific Question:   Hospitalized for COVID-19? Answer:   No     Order Specific Question:   Admitted to ICU for COVID-19? Answer:   No     Order Specific Question:   Employed in healthcare setting? Answer:   No     Order Specific Question:   Resident in a congregate (group) care setting? Answer:   No     Order Specific Question:   Pregnant: Answer:   No     Order Specific Question:   Previously tested for COVID-19? Answer:   Yes     Covid negative. Patient was advised to seek further evaluation and treatment at the nearest ER now. Patient verbalizes understanding and states that his girlfriend will take him. Keep follow up appointment with Cardiology. I have reviewed and updated the electronic medical record. Return if symptoms worsen or fail to improve, for follow up with PCP.     NICK Hudson NP

## 2022-09-01 DIAGNOSIS — F41.0 GENERALIZED ANXIETY DISORDER WITH PANIC ATTACKS: ICD-10-CM

## 2022-09-01 DIAGNOSIS — F41.1 GENERALIZED ANXIETY DISORDER WITH PANIC ATTACKS: ICD-10-CM

## 2022-09-01 RX ORDER — HYDROXYZINE PAMOATE 25 MG/1
25 CAPSULE ORAL 3 TIMES DAILY PRN
Qty: 60 CAPSULE | Refills: 1 | Status: SHIPPED | OUTPATIENT
Start: 2022-09-01 | End: 2022-10-11

## 2022-09-01 NOTE — TELEPHONE ENCOUNTER
Comments:     Last Office Visit (last PCP visit):   2/25/2022    Next Visit Date:  No future appointments. **If hasn't been seen in over a year OR hasn't followed up according to last diabetes/ADHD visit, make appointment for patient before sending refill to provider.     Rx requested:  Requested Prescriptions     Pending Prescriptions Disp Refills    hydrOXYzine pamoate (VISTARIL) 25 MG capsule [Pharmacy Med Name: hydroxyzine pamoate 25 mg capsule] 60 capsule 1     Sig: Take 1 capsule by mouth 3 times daily as needed for Anxiety

## 2022-11-10 ENCOUNTER — OFFICE VISIT (OUTPATIENT)
Dept: INTERNAL MEDICINE | Age: 62
End: 2022-11-10
Payer: COMMERCIAL

## 2022-11-10 VITALS
DIASTOLIC BLOOD PRESSURE: 68 MMHG | TEMPERATURE: 96.9 F | OXYGEN SATURATION: 96 % | SYSTOLIC BLOOD PRESSURE: 116 MMHG | RESPIRATION RATE: 20 BRPM | HEART RATE: 88 BPM | WEIGHT: 246.8 LBS | BODY MASS INDEX: 31.69 KG/M2

## 2022-11-10 DIAGNOSIS — R35.89 POLYURIA: Primary | ICD-10-CM

## 2022-11-10 DIAGNOSIS — M25.50 POLYARTHRALGIA: ICD-10-CM

## 2022-11-10 DIAGNOSIS — D22.9 ATYPICAL MOLE: ICD-10-CM

## 2022-11-10 DIAGNOSIS — Z23 NEED FOR TDAP VACCINATION: ICD-10-CM

## 2022-11-10 DIAGNOSIS — Z00.00 HEALTH CARE MAINTENANCE: ICD-10-CM

## 2022-11-10 DIAGNOSIS — Z23 NEED FOR INFLUENZA VACCINATION: ICD-10-CM

## 2022-11-10 DIAGNOSIS — R35.89 POLYURIA: ICD-10-CM

## 2022-11-10 LAB
BILIRUBIN, POC: ABNORMAL
BLOOD URINE, POC: 10
CLARITY, POC: CLEAR
COLOR, POC: ABNORMAL
GLUCOSE URINE, POC: ABNORMAL
KETONES, POC: ABNORMAL
LEUKOCYTE EST, POC: ABNORMAL
NITRITE, POC: ABNORMAL
PH, POC: 6
PROTEIN, POC: 15
SPECIFIC GRAVITY, POC: 1.02
UROBILINOGEN, POC: 0.2

## 2022-11-10 PROCEDURE — 90715 TDAP VACCINE 7 YRS/> IM: CPT | Performed by: FAMILY MEDICINE

## 2022-11-10 PROCEDURE — 1036F TOBACCO NON-USER: CPT | Performed by: FAMILY MEDICINE

## 2022-11-10 PROCEDURE — G8427 DOCREV CUR MEDS BY ELIG CLIN: HCPCS | Performed by: FAMILY MEDICINE

## 2022-11-10 PROCEDURE — 81003 URINALYSIS AUTO W/O SCOPE: CPT | Performed by: FAMILY MEDICINE

## 2022-11-10 PROCEDURE — 3017F COLORECTAL CA SCREEN DOC REV: CPT | Performed by: FAMILY MEDICINE

## 2022-11-10 PROCEDURE — 3074F SYST BP LT 130 MM HG: CPT | Performed by: FAMILY MEDICINE

## 2022-11-10 PROCEDURE — G8482 FLU IMMUNIZE ORDER/ADMIN: HCPCS | Performed by: FAMILY MEDICINE

## 2022-11-10 PROCEDURE — G8417 CALC BMI ABV UP PARAM F/U: HCPCS | Performed by: FAMILY MEDICINE

## 2022-11-10 PROCEDURE — 90471 IMMUNIZATION ADMIN: CPT | Performed by: FAMILY MEDICINE

## 2022-11-10 PROCEDURE — 99213 OFFICE O/P EST LOW 20 MIN: CPT | Performed by: FAMILY MEDICINE

## 2022-11-10 PROCEDURE — 90472 IMMUNIZATION ADMIN EACH ADD: CPT | Performed by: FAMILY MEDICINE

## 2022-11-10 PROCEDURE — 90674 CCIIV4 VAC NO PRSV 0.5 ML IM: CPT | Performed by: FAMILY MEDICINE

## 2022-11-10 PROCEDURE — 3078F DIAST BP <80 MM HG: CPT | Performed by: FAMILY MEDICINE

## 2022-11-10 RX ORDER — DILTIAZEM HYDROCHLORIDE 240 MG/1
240 CAPSULE, EXTENDED RELEASE ORAL DAILY
COMMUNITY

## 2022-11-10 RX ORDER — HYDROXYZINE PAMOATE 25 MG/1
25 CAPSULE ORAL 3 TIMES DAILY PRN
COMMUNITY

## 2022-11-10 ASSESSMENT — ENCOUNTER SYMPTOMS
RESPIRATORY NEGATIVE: 1
GASTROINTESTINAL NEGATIVE: 1
EYES NEGATIVE: 1

## 2022-11-10 NOTE — PROGRESS NOTES
Vaccine Information Sheet, \"Influenza - Inactivated\"  given to James Dugan, or parent/legal guardian of  James Dugan and verbalized understanding. Patient responses:    Have you ever had a reaction to a flu vaccine? No  Are you able to eat eggs without adverse effects? No  Do you have any current illness? No  Have you ever had Guillian Powell Syndrome? No    Flu vaccine given per order. Please see immunization tab.

## 2022-11-10 NOTE — PROGRESS NOTES
Subjective:      Patient ID: Bob Fregoso is a 58 y.o. male who presents today for:  Chief Complaint   Patient presents with    Mole     On chest, pt also needs colonoscopy       HPI patient states that he would like to have colonoscopy. His bowel movements are normal.  However he is complaining of polyuria for the past month. It occurs during the day and at night. His urinary flow is also decreased. No burning with urination. He has a history of polyarthralgia and possible rheumatic disease per patient. He does not currently have rheumatologist but wishes to see 1. Patient has an atypical mole on his chest that has changed in size and shape recently. No fever or chills. No abdominal pain. No flank pain. No nausea or vomiting. No chest pain or shortness of breath.     Past Medical History:   Diagnosis Date    Anxiety 7/14/2019    Hypertension     Mixed hyperlipidemia 7/27/2022    Situational depression 7/14/2019     Past Surgical History:   Procedure Laterality Date    APPENDECTOMY      KNEE SURGERY Right     interpartial     Social History     Socioeconomic History    Marital status:      Spouse name: Not on file    Number of children: Not on file    Years of education: Not on file    Highest education level: Not on file   Occupational History    Not on file   Tobacco Use    Smoking status: Never    Smokeless tobacco: Never   Vaping Use    Vaping Use: Never used   Substance and Sexual Activity    Alcohol use: No    Drug use: No    Sexual activity: Never   Other Topics Concern    Not on file   Social History Narrative    Not on file     Social Determinants of Health     Financial Resource Strain: Low Risk     Difficulty of Paying Living Expenses: Not hard at all   Food Insecurity: No Food Insecurity    Worried About Running Out of Food in the Last Year: Never true    Ran Out of Food in the Last Year: Never true   Transportation Needs: Not on file   Physical Activity: Not on file   Stress: Not on file   Social Connections: Not on file   Intimate Partner Violence: Not on file   Housing Stability: Not on file     Family History   Problem Relation Age of Onset    Cancer Father         prostate    Other Father         blood clot - lung    Other Mother         scleroderma     Allergies   Allergen Reactions    Oxycodone     Terazosin          Review of Systems   Constitutional: Negative. HENT: Negative. Eyes: Negative. Respiratory: Negative. Cardiovascular: Negative. Gastrointestinal: Negative. Genitourinary:  Positive for difficulty urinating and frequency. Negative for dysuria, flank pain, penile pain and urgency. Musculoskeletal:  Positive for arthralgias and myalgias. Negative for neck pain and neck stiffness. Skin:         Mole on chest that's changing   Neurological: Negative. Psychiatric/Behavioral: Negative. Objective:   /68   Pulse 88   Temp 96.9 °F (36.1 °C) (Infrared)   Resp 20   Wt 246 lb 12.8 oz (111.9 kg)   SpO2 96%   BMI 31.69 kg/m²     Physical Exam  Constitutional:       General: He is not in acute distress. Appearance: He is not toxic-appearing. HENT:      Head: Normocephalic. Nose: Nose normal.      Mouth/Throat:      Mouth: Mucous membranes are moist.      Pharynx: Oropharynx is clear. Eyes:      Extraocular Movements: Extraocular movements intact. Conjunctiva/sclera: Conjunctivae normal.      Pupils: Pupils are equal, round, and reactive to light. Cardiovascular:      Rate and Rhythm: Normal rate and regular rhythm. Heart sounds: Normal heart sounds. Pulmonary:      Effort: Pulmonary effort is normal.      Breath sounds: Normal breath sounds. Abdominal:      General: Abdomen is flat. There is no distension. Palpations: Abdomen is soft. There is no mass. Tenderness: There is no abdominal tenderness. There is no right CVA tenderness, left CVA tenderness, guarding or rebound.    Genitourinary:     Comments: Pt declined rectal/prostate exam  Musculoskeletal:         General: Normal range of motion. Cervical back: Normal range of motion and neck supple. No rigidity. Lymphadenopathy:      Cervical: No cervical adenopathy. Skin:     General: Skin is warm and dry. Findings: Lesion (atypical mole right chest wall- irreg shape, approx 0.7cm) present. Neurological:      General: No focal deficit present. Mental Status: He is alert and oriented to person, place, and time. Cranial Nerves: No cranial nerve deficit. Motor: No weakness. Gait: Gait normal.   Psychiatric:         Mood and Affect: Mood normal.         Behavior: Behavior normal.         Thought Content: Thought content normal.         Judgment: Judgment normal.       Assessment:       Diagnosis Orders   1. Polyuria  Ambulatory referral to Urology    POCT Urinalysis No Micro (Auto)    Culture, Urine    CBC with Auto Differential    Comprehensive Metabolic Panel    TSH with Reflex      2. Atypical mole  [facility] - Autumn Ortega MD, Dermatoloty, [facility]      3. Health care maintenance  Shyam Mckenna MD, Gastroenterology, [facility]      4. Polyarthralgia  Amb External Referral To Rheumatology    CBC with Auto Differential    Comprehensive Metabolic Panel    TSH with Reflex      5. Need for influenza vaccination  Influenza, FLUCELVAX, (age 10 mo+), IM, Preservative Free, 0.5 mL      6. Need for Tdap vaccination  Tdap, BOOSTRIX, (age 8 yrs+), IM            Plan:      Orders Placed This Encounter   Procedures    Culture, Urine     Standing Status:   Future     Number of Occurrences:   1     Standing Expiration Date:   11/10/2023     Order Specific Question:   Specify (ex-cath, midstream, cysto, etc)?      Answer:   midstream    Influenza, FLUCELVAX, (age 10 mo+), IM, Preservative Free, 0.5 mL    Tdap, BOOSTRIX, (age 8 yrs+), IM    CBC with Auto Differential     Standing Status:   Future     Standing Expiration Date:   11/10/2023 Comprehensive Metabolic Panel     Standing Status:   Future     Standing Expiration Date:   11/10/2023    TSH with Reflex     Standing Status:   Future     Standing Expiration Date:   11/10/2023    Kinza Mckeon MD, Gastroenterology, [facility]     Referral Priority:   Routine     Referral Type:   Eval and Treat     Referral Reason:   Specialty Services Required     Referred to Provider:   Audra Quick MD     Requested Specialty:   Gastroenterology     Number of Visits Requested:   1    Ambulatory referral to Urology     Referral Priority:   Routine     Referral Type:   Eval and Treat     Referral Reason:   Specialty Services Required     Referred to Provider:   Celia Kendall MD     Requested Specialty:   Urology     Number of Visits Requested:   1    Amb External Referral To Rheumatology     Referral Priority:   Routine     Referral Type:   Eval and Treat     Referral Reason:   Specialty Services Required     Referred to Provider:   Keyana Johnson MD     Requested Specialty:   Rheumatology     Number of Visits Requested:   1    [facility] - Lisy Hennessy MD, Dermatoloty, [address]     Referral Priority:   Routine     Referral Type:   Eval and Treat     Referral Reason:   Specialty Services Required     Referred to Provider:   Keila Calix MD     Requested Specialty:   Dermatology     Number of Visits Requested:   1    POCT Urinalysis No Micro (Auto)     No orders of the defined types were placed in this encounter. Return in about 4 weeks (around 12/8/2022) for result review, symptom review. Strongly encouraged patient to see urology due to possible prostate hypertrophy. Follow-up on urine culture. Advised with emergency room with worsening symptoms, fever/chills, abdominal pain, dizziness, increased difficulty with urination or dysuria. Discussed importance of dermatology evaluation of mole on chest and risk of possible melanoma.     Richard Pope MD

## 2022-11-10 NOTE — PROGRESS NOTES
After obtaining consent, and per orders of Dr. Andrew Veálsquez, injection of Boostrix given in Right deltoid by Ann Grissom MA. Patient instructed to remain in clinic for 20 minutes afterwards, and to report any adverse reaction to me immediately.

## 2022-11-12 LAB — URINE CULTURE, ROUTINE: NORMAL

## 2022-12-17 DIAGNOSIS — F41.1 GENERALIZED ANXIETY DISORDER: ICD-10-CM

## 2022-12-19 RX ORDER — HYDROXYZINE PAMOATE 25 MG/1
CAPSULE ORAL
Qty: 60 CAPSULE | Refills: 1 | Status: SHIPPED | OUTPATIENT
Start: 2022-12-19

## 2022-12-19 NOTE — TELEPHONE ENCOUNTER
Comments:     Last Office Visit (last PCP visit):   2/25/2022    Next Visit Date:  Future Appointments   Date Time Provider Arsenio Mcdonough   1/5/2023  9:00 AM Micki Vega MD [facility]       **If hasn't been seen in over a year OR hasn't followed up according to last diabetes/ADHD visit, make appointment for patient before sending refill to provider.     Rx requested:  Requested Prescriptions     Pending Prescriptions Disp Refills    hydrOXYzine pamoate (VISTARIL) 25 MG capsule [Pharmacy Med Name: hydroxyzine pamoate 25 mg capsule] 60 capsule 1     Sig: Take 1 capsule by mouth 3 times daily as needed for Anxiety

## 2022-12-28 ENCOUNTER — ANESTHESIA (OUTPATIENT)
Dept: ENDOSCOPY | Age: 62
End: 2022-12-28
Payer: COMMERCIAL

## 2022-12-28 ENCOUNTER — HOSPITAL ENCOUNTER (OUTPATIENT)
Age: 62
Setting detail: OUTPATIENT SURGERY
Discharge: HOME | End: 2022-12-28
Attending: INTERNAL MEDICINE | Admitting: INTERNAL MEDICINE
Payer: COMMERCIAL

## 2022-12-28 ENCOUNTER — ANESTHESIA EVENT (OUTPATIENT)
Dept: ENDOSCOPY | Age: 62
End: 2022-12-28
Payer: COMMERCIAL

## 2022-12-28 VITALS
BODY MASS INDEX: 31.44 KG/M2 | DIASTOLIC BLOOD PRESSURE: 76 MMHG | HEIGHT: 74 IN | WEIGHT: 245 LBS | TEMPERATURE: 96.7 F | HEART RATE: 74 BPM | OXYGEN SATURATION: 95 % | RESPIRATION RATE: 18 BRPM | SYSTOLIC BLOOD PRESSURE: 125 MMHG

## 2022-12-28 DIAGNOSIS — K62.0 ANORECTAL POLYP: Primary | ICD-10-CM

## 2022-12-28 DIAGNOSIS — K62.1 ANORECTAL POLYP: Primary | ICD-10-CM

## 2022-12-28 DIAGNOSIS — Z12.11 COLON CANCER SCREENING: ICD-10-CM

## 2022-12-28 PROBLEM — K63.5 POLYP OF COLON: Status: ACTIVE | Noted: 2022-12-28

## 2022-12-28 PROCEDURE — 45380 COLONOSCOPY AND BIOPSY: CPT | Performed by: INTERNAL MEDICINE

## 2022-12-28 PROCEDURE — 3609027000 HC COLONOSCOPY: Performed by: INTERNAL MEDICINE

## 2022-12-28 PROCEDURE — 3700000000 HC ANESTHESIA ATTENDED CARE: Performed by: INTERNAL MEDICINE

## 2022-12-28 PROCEDURE — 45385 COLONOSCOPY W/LESION REMOVAL: CPT | Performed by: INTERNAL MEDICINE

## 2022-12-28 PROCEDURE — 2709999900 HC NON-CHARGEABLE SUPPLY: Performed by: INTERNAL MEDICINE

## 2022-12-28 PROCEDURE — 88305 TISSUE EXAM BY PATHOLOGIST: CPT

## 2022-12-28 PROCEDURE — 2580000003 HC RX 258

## 2022-12-28 PROCEDURE — 6370000000 HC RX 637 (ALT 250 FOR IP): Performed by: INTERNAL MEDICINE

## 2022-12-28 PROCEDURE — 6360000002 HC RX W HCPCS: Performed by: NURSE ANESTHETIST, CERTIFIED REGISTERED

## 2022-12-28 PROCEDURE — 2580000003 HC RX 258: Performed by: INTERNAL MEDICINE

## 2022-12-28 PROCEDURE — 7100000010 HC PHASE II RECOVERY - FIRST 15 MIN: Performed by: INTERNAL MEDICINE

## 2022-12-28 PROCEDURE — 7100000011 HC PHASE II RECOVERY - ADDTL 15 MIN: Performed by: INTERNAL MEDICINE

## 2022-12-28 PROCEDURE — 3700000001 HC ADD 15 MINUTES (ANESTHESIA): Performed by: INTERNAL MEDICINE

## 2022-12-28 PROCEDURE — 2500000003 HC RX 250 WO HCPCS: Performed by: NURSE ANESTHETIST, CERTIFIED REGISTERED

## 2022-12-28 RX ORDER — MAGNESIUM HYDROXIDE 1200 MG/15ML
LIQUID ORAL PRN
Status: DISCONTINUED | OUTPATIENT
Start: 2022-12-28 | End: 2022-12-28 | Stop reason: ALTCHOICE

## 2022-12-28 RX ORDER — LIDOCAINE HYDROCHLORIDE 20 MG/ML
INJECTION, SOLUTION EPIDURAL; INFILTRATION; INTRACAUDAL; PERINEURAL PRN
Status: DISCONTINUED | OUTPATIENT
Start: 2022-12-28 | End: 2022-12-28 | Stop reason: SDUPTHER

## 2022-12-28 RX ORDER — PROPOFOL 10 MG/ML
INJECTION, EMULSION INTRAVENOUS PRN
Status: DISCONTINUED | OUTPATIENT
Start: 2022-12-28 | End: 2022-12-28 | Stop reason: SDUPTHER

## 2022-12-28 RX ORDER — SODIUM CHLORIDE 9 MG/ML
INJECTION, SOLUTION INTRAVENOUS
Status: COMPLETED
Start: 2022-12-28 | End: 2022-12-28

## 2022-12-28 RX ORDER — SODIUM CHLORIDE 9 MG/ML
INJECTION, SOLUTION INTRAVENOUS CONTINUOUS
Status: DISCONTINUED | OUTPATIENT
Start: 2022-12-28 | End: 2022-12-28 | Stop reason: HOSPADM

## 2022-12-28 RX ORDER — ASPIRIN 81 MG/1
81 TABLET, CHEWABLE ORAL DAILY
COMMUNITY

## 2022-12-28 RX ORDER — SIMETHICONE 20 MG/.3ML
EMULSION ORAL PRN
Status: DISCONTINUED | OUTPATIENT
Start: 2022-12-28 | End: 2022-12-28 | Stop reason: ALTCHOICE

## 2022-12-28 RX ADMIN — SODIUM CHLORIDE: 9 INJECTION, SOLUTION INTRAVENOUS at 10:25

## 2022-12-28 RX ADMIN — PROPOFOL 60 MG: 10 INJECTION, EMULSION INTRAVENOUS at 11:03

## 2022-12-28 RX ADMIN — PROPOFOL 50 MG: 10 INJECTION, EMULSION INTRAVENOUS at 11:05

## 2022-12-28 RX ADMIN — PROPOFOL 50 MG: 10 INJECTION, EMULSION INTRAVENOUS at 10:52

## 2022-12-28 RX ADMIN — PROPOFOL 50 MG: 10 INJECTION, EMULSION INTRAVENOUS at 10:55

## 2022-12-28 RX ADMIN — PROPOFOL 50 MG: 10 INJECTION, EMULSION INTRAVENOUS at 10:49

## 2022-12-28 RX ADMIN — PROPOFOL 50 MG: 10 INJECTION, EMULSION INTRAVENOUS at 11:00

## 2022-12-28 RX ADMIN — LIDOCAINE HYDROCHLORIDE 50 MG: 20 INJECTION, SOLUTION EPIDURAL; INFILTRATION; INTRACAUDAL; PERINEURAL at 10:45

## 2022-12-28 RX ADMIN — PROPOFOL 50 MG: 10 INJECTION, EMULSION INTRAVENOUS at 10:57

## 2022-12-28 RX ADMIN — PROPOFOL 170 MG: 10 INJECTION, EMULSION INTRAVENOUS at 10:45

## 2022-12-28 ASSESSMENT — PAIN - FUNCTIONAL ASSESSMENT: PAIN_FUNCTIONAL_ASSESSMENT: NONE - DENIES PAIN

## 2022-12-28 NOTE — H&P
Patient Name: Kinza Duty  : 1960  MRN: [id number]  DATE: 22      ENDOSCOPY  History and Physical    Procedure:    [] Diagnostic Colonoscopy       [x] Screening Colonoscopy  [] EGD      [] ERCP      [] EUS       [] Other    [x] Previous office notes/History and Physical reviewed from the patients chart. Please see EMR for further details of HPI. I have examined the patient's status immediately prior to the procedure and:      Indications/HPI:    []Abdominal Pain   []Cancer- GI/Lung  []Fhx of colon CA/polyps  []History of Polyps   []Fuentess   []Melena  []Abnormal Imaging   []Dysphagia    []Persistent Pneumonia  []Anemia   []Food Impaction  []History of Polyps  []GI Bleed   []Pulmonary nodule/Mass  []Change in bowel habits  []Heartburn/Reflux  []Rectal Bleed (BRBPR)  []Chest Pain - Non Cardiac  []Heme (+) Stool  []Ulcers  []Constipation   []Hemoptysis   []Varices  []Diarrhea   []Hypoxemia  []Nausea/Vomiting   [x]Screening   []Crohns/Colitis  []Other:    Anesthesia:   [x] MAC [] Moderate Sedation   [] General   [] None     ROS: 12 pt Review of Symptoms was negative unless mentioned above    Medications:   Prior to Admission medications    Medication Sig Start Date End Date Taking? Authorizing Provider   hydrOXYzine pamoate (VISTARIL) 25 MG capsule Take 1 capsule by mouth 3 times daily as needed for Anxiety 22   ERON Liu   dilTIAZem (DILACOR XR) 240 MG extended release capsule Take 240 mg by mouth daily    Historical Provider, MD   dofetilide (TIKOSYN) 125 MCG capsule Take 125 mcg by mouth in the morning and 125 mcg before bedtime. Historical Provider, MD   atorvastatin (LIPITOR) 40 MG tablet Take by mouth 21  Historical Provider, MD   nitroGLYCERIN (NITROSTAT) 0.4 MG SL tablet Place under the tongue 21   Historical Provider, MD   ELIQUIS 5 MG TABS tablet TAKE 1 TABLET BY MOUTH EVERY 12 HOURS 10/4/19   Historical Provider, MD       Allergies:    Allergies   Allergen Reactions    Oxycodone     Terazosin         History of allergic reaction to anesthesia:  No    Past Medical History:  Past Medical History:   Diagnosis Date    Anxiety 7/14/2019    Hypertension     Mixed hyperlipidemia 7/27/2022    Situational depression 7/14/2019       Past Surgical History:  Past Surgical History:   Procedure Laterality Date    APPENDECTOMY      KNEE SURGERY Right     interpartial       Social History:  Social History     Tobacco Use    Smoking status: Never    Smokeless tobacco: Never   Vaping Use    Vaping Use: Never used   Substance Use Topics    Alcohol use: No    Drug use: No       Vital Signs: There were no vitals filed for this visit. Physical Exam:  Cardiac:  [x]WNL  []Comments:  Pulmonary:  [x]WNL   []Comments:   Neuro/Mental Status:  [x]WNL  []Comments:  Abdominal:  [x]WNL    []Comments:  Other:   []WNL  []Comments:    Informed Consent:  The risks and benefits of the procedure have been discussed with either the patient or if they cannot consent, their representative. Assessment:  Patient examined and appropriate for planned sedation and procedure. Plan:  Proceed with planned sedation and procedure as above.     Lyudmila Vale MD  10:02 AM

## 2022-12-28 NOTE — ANESTHESIA PRE PROCEDURE
Department of Anesthesiology  Preprocedure Note       Name:  Charis Palencia   Age:  58 y.o.  :  1960                                          MRN:  [id number]         Date:  2022      Surgeon: Luana Bob):  Maksim Hugo MD    Procedure: Procedure(s):  COLORECTAL CANCER SCREENING, NOT HIGH RISK    Medications prior to admission:   Prior to Admission medications    Medication Sig Start Date End Date Taking? Authorizing Provider   hydrOXYzine pamoate (VISTARIL) 25 MG capsule Take 1 capsule by mouth 3 times daily as needed for Anxiety 22   ERON Tenorio   dilTIAZem (DILACOR XR) 240 MG extended release capsule Take 240 mg by mouth daily    Historical Provider, MD   dofetilide (TIKOSYN) 125 MCG capsule Take 125 mcg by mouth in the morning and 125 mcg before bedtime. Historical Provider, MD   atorvastatin (LIPITOR) 40 MG tablet Take by mouth 21  Historical Provider, MD   nitroGLYCERIN (NITROSTAT) 0.4 MG SL tablet Place under the tongue 21   Historical Provider, MD   ELIQUIS 5 MG TABS tablet TAKE 1 TABLET BY MOUTH EVERY 12 HOURS 10/4/19   Historical Provider, MD       Current medications:    No current facility-administered medications for this encounter. Allergies:     Allergies   Allergen Reactions    Oxycodone     Terazosin        Problem List:    Patient Active Problem List   Diagnosis Code    Essential hypertension I10    Arthritis of knee, degenerative M17.9    Pain following surgery or procedure G89.18    PMR (polymyalgia rheumatica) ([facility]) M35.3    Anxiety F41.9    Situational depression F43.21    Positive depression screening Z13.31    NSTEMI, initial episode of care ([facility] I21.4    Mixed hyperlipidemia E78.2    Other persistent atrial fibrillation ([facility] I48.19    Peripheral arterial disease ([facility] I73.9       Past Medical History:        Diagnosis Date    Anxiety 2019    Hypertension     Mixed hyperlipidemia 2022    Situational depression 7/14/2019       Past Surgical History:        Procedure Laterality Date    APPENDECTOMY      KNEE SURGERY Right     interpartial       Social History:    Social History     Tobacco Use    Smoking status: Never    Smokeless tobacco: Never   Substance Use Topics    Alcohol use: No                                Counseling given: Not Answered      Vital Signs (Current): There were no vitals filed for this visit. BP Readings from Last 3 Encounters:   11/10/22 116/68   07/27/22 108/60   06/03/22 128/80       NPO Status:                                                                                 BMI:   Wt Readings from Last 3 Encounters:   11/10/22 246 lb 12.8 oz (111.9 kg)   07/27/22 245 lb (111.1 kg)   06/03/22 245 lb (111.1 kg)     There is no height or weight on file to calculate BMI.    CBC:   Lab Results   Component Value Date/Time    WBC 2.7 07/27/2022 06:10 PM    WBC 3.1 08/19/2021 06:49 PM    RBC 4.19 07/27/2022 06:10 PM    RBC 4.84 08/31/2018 04:06 PM    HGB 11.8 07/27/2022 06:10 PM    HCT 36.4 07/27/2022 06:10 PM    MCV 87 07/27/2022 06:10 PM    RDW 15.0 08/19/2021 06:49 PM    PLT 88 07/27/2022 06:10 PM       CMP:   Lab Results   Component Value Date/Time     07/27/2022 06:10 PM    K 3.7 07/27/2022 06:10 PM     07/27/2022 06:10 PM    CO2 29 08/31/2018 04:06 PM    BUN 16 08/31/2018 04:06 PM    CREATININE 1.13 07/27/2022 06:10 PM    GFRAA >60 02/01/2021 06:27 PM    AGRATIO 1.6 07/09/2015 03:00 PM    LABGLOM >60 02/01/2021 06:27 PM    GLUCOSE 91 07/27/2022 06:10 PM    PROT 6.7 07/27/2022 06:10 PM    CALCIUM 9.5 07/27/2022 06:10 PM    BILITOT 0.7 07/27/2022 06:10 PM    ALKPHOS 101 07/27/2022 06:10 PM    AST 19 07/27/2022 06:10 PM    ALT 15 07/27/2022 06:10 PM       POC Tests: No results for input(s): POCGLU, POCNA, POCK, POCCL, POCBUN, POCHEMO, POCHCT in the last 72 hours.     Coags:   Lab Results   Component Value Date/Time    PROTIME 16.9 02/28/2022 09:17 AM    INR 1.5 02/28/2022 09:17 AM    APTT 35 02/28/2022 09:17 AM       HCG (If Applicable): No results found for: PREGTESTUR, PREGSERUM, HCG, HCGQUANT     ABGs: No results found for: PHART, PO2ART, CEE5IXC, OBD6BTU, BEART, T5URPASN     Type & Screen (If Applicable):  No results found for: LABABO, LABRH    Drug/Infectious Status (If Applicable):  No results found for: HIV, HEPCAB    COVID-19 Screening (If Applicable):   Lab Results   Component Value Date/Time    COVID19 NOT DETECTED 07/27/2022 06:15 PM           Anesthesia Evaluation  Patient summary reviewed  Airway: Mallampati: II  TM distance: >3 FB   Neck ROM: full  Mouth opening: > = 3 FB   Dental:          Pulmonary:Negative Pulmonary ROS and normal exam                               Cardiovascular:    (+) hypertension:, past MI:, dysrhythmias: atrial fibrillation, hyperlipidemia                  Neuro/Psych:   (+) depression/anxiety             GI/Hepatic/Renal: Neg GI/Hepatic/Renal ROS            Endo/Other:    (+) : arthritis:., .                 Abdominal:             Vascular: negative vascular ROS. Other Findings:           Anesthesia Plan      MAC     ASA 3       Induction: intravenous. Anesthetic plan and risks discussed with patient.       Plan discussed with surgical team.                    NICK Curry - SCOTTY   12/28/2022

## 2022-12-28 NOTE — ANESTHESIA POSTPROCEDURE EVALUATION
Department of Anesthesiology  Postprocedure Note    Patient: Ely Reyes  MRN: [id number]  YOB: 1960  Date of evaluation: 12/28/2022      Procedure Summary     Date: 12/28/22 Room / Location: [address] [place] [address]    Anesthesia Start: 1042 Anesthesia Stop: 0    Procedure: COLORECTAL CANCER SCREENING, w/biopsies and polypectomy Diagnosis:       Colon cancer screening      (Colon cancer screening [Z12.11])    Surgeons: Flavia Rubio MD Responsible Provider: NICK Gray CRNA    Anesthesia Type: MAC ASA Status: 3          Anesthesia Type: No value filed.     Mitzi Phase I:      Mitzi Phase II:        Anesthesia Post Evaluation    Patient location during evaluation: PACU  Level of consciousness: awake  Pain score: 0  Airway patency: patent  Nausea & Vomiting: no vomiting and no nausea  Complications: no  Cardiovascular status: hemodynamically stable  Respiratory status: acceptable  Hydration status: stable

## 2023-01-19 ENCOUNTER — OFFICE VISIT (OUTPATIENT)
Dept: UROLOGY | Age: 63
End: 2023-01-19
Payer: COMMERCIAL

## 2023-01-19 VITALS
HEIGHT: 74 IN | DIASTOLIC BLOOD PRESSURE: 76 MMHG | SYSTOLIC BLOOD PRESSURE: 116 MMHG | WEIGHT: 245 LBS | OXYGEN SATURATION: 98 % | HEART RATE: 89 BPM | BODY MASS INDEX: 31.44 KG/M2

## 2023-01-19 DIAGNOSIS — R33.9 RETENTION OF URINE: Primary | ICD-10-CM

## 2023-01-19 DIAGNOSIS — R35.1 BPH ASSOCIATED WITH NOCTURIA: ICD-10-CM

## 2023-01-19 DIAGNOSIS — N40.1 BPH ASSOCIATED WITH NOCTURIA: ICD-10-CM

## 2023-01-19 LAB — POST VOID RESIDUAL (PVR): 33 ML

## 2023-01-19 PROCEDURE — 3074F SYST BP LT 130 MM HG: CPT | Performed by: UROLOGY

## 2023-01-19 PROCEDURE — 3078F DIAST BP <80 MM HG: CPT | Performed by: UROLOGY

## 2023-01-19 PROCEDURE — 99243 OFF/OP CNSLTJ NEW/EST LOW 30: CPT | Performed by: UROLOGY

## 2023-01-19 PROCEDURE — 51798 US URINE CAPACITY MEASURE: CPT | Performed by: UROLOGY

## 2023-01-19 PROCEDURE — G8427 DOCREV CUR MEDS BY ELIG CLIN: HCPCS | Performed by: UROLOGY

## 2023-01-19 PROCEDURE — G8417 CALC BMI ABV UP PARAM F/U: HCPCS | Performed by: UROLOGY

## 2023-01-19 PROCEDURE — G8482 FLU IMMUNIZE ORDER/ADMIN: HCPCS | Performed by: UROLOGY

## 2023-01-19 RX ORDER — HYDROXYCHLOROQUINE SULFATE 200 MG/1
TABLET, FILM COATED ORAL 2 TIMES DAILY
COMMUNITY

## 2023-01-19 RX ORDER — CLOPIDOGREL BISULFATE 75 MG/1
75 TABLET ORAL DAILY
COMMUNITY

## 2023-01-19 NOTE — PROGRESS NOTES
[facility] EVALUATION NOTE                                                 H&P          Note:  Assessment and plan  Nocturia diminished force of stream  60-year-old male with family history of prostate cancer (father)  10 to 8-month history of worsening nocturia  Patient voids every 2-3 hours at night and during the day  Mild caffeine intake during the day  Question of sleep apnea at night which may be causing his frequency at night which should be addressed  Patient also noted to have a sessile mass anterior rectum noted during his prostate exam that is being worked up by gastroenterology  For has a small prostate PSA pending postvoid residual 33 cc  Patient was advised to stop caffeine intake  He will discuss issue of sleep apnea with his primary      The note below is complete evaluation of patient on follow-up/consultation                                                                                                                                                 Reason for Visit  Worsening nocturia    History of Present Illness  60-year-old male with history of nocturia over the past 6 to 8 months  Denies hematuria, UTIs  Questionable history of kidney stones      Urologic Review of Systems/Symptoms  Nocturia  Voids every 2-3 hours    Review of Systems  Hospitalization: None recent  All 14 categories of Review of Systems otherwise reviewed no other findings reported.     Past Medical History:   Diagnosis Date    Anxiety 7/14/2019    Hypertension     Mixed hyperlipidemia 7/27/2022    Situational depression 7/14/2019     Past Surgical History:   Procedure Laterality Date    APPENDECTOMY      CARDIAC SURGERY  12/2022    watchman placed    COLONOSCOPY N/A 12/28/2022    COLORECTAL CANCER SCREENING, w/biopsies and polypectomy performed by Opal Page MD at [place] [address] Right     interpartial    SKIN CANCER EXCISION  10/2022    melanoma removal     Social History     Socioeconomic History    Marital status:      Spouse name: None    Number of children: None    Years of education: None    Highest education level: None   Tobacco Use    Smoking status: Never    Smokeless tobacco: Never   Vaping Use    Vaping Use: Never used   Substance and Sexual Activity    Alcohol use: No    Drug use: No    Sexual activity: Never     Social Determinants of Health     Financial Resource Strain: Low Risk     Difficulty of Paying Living Expenses: Not hard at all   Food Insecurity: No Food Insecurity    Worried About Running Out of Food in the Last Year: Never true    Ran Out of Food in the Last Year: Never true     Family History   Problem Relation Age of Onset    Other Mother         scleroderma    Cancer Father         prostate    Other Father         blood clot - lung    Colon Cancer Neg Hx      Current Outpatient Medications   Medication Sig Dispense Refill    clopidogrel (PLAVIX) 75 MG tablet Take 75 mg by mouth daily      hydroxychloroquine (PLAQUENIL) 200 MG tablet Take by mouth 2 times daily      aspirin 81 MG chewable tablet Take 81 mg by mouth daily      hydrOXYzine pamoate (VISTARIL) 25 MG capsule Take 1 capsule by mouth 3 times daily as needed for Anxiety 60 capsule 1    dilTIAZem (DILACOR XR) 240 MG extended release capsule Take 240 mg by mouth daily      dofetilide (TIKOSYN) 125 MCG capsule Take 125 mcg by mouth in the morning and 125 mcg before bedtime. nitroGLYCERIN (NITROSTAT) 0.4 MG SL tablet Place under the tongue (Patient not taking: Reported on 1/19/2023)       No current facility-administered medications for this visit.      Oxycodone and Terazosin  All reviewed and verified by Dr Blair Masters on today's visit    No results found for: PSA, PSADIA  Results for POC orders placed in visit on 01/19/23   poct post void residual   Result Value Ref Range    post void residual 33 ml    Narrative    A point of care test   Post Void Residual was completed by performing  ultrasound scan of the bladder and  reviewed by Dr Alexi Martinez       Physical Exam  Vitals:    01/19/23 1007   BP: 116/76   Pulse: 89   SpO2: 98%   Weight: 245 lb (111.1 kg)   Height: 6' 2\" (1.88 m)     Constitutional: Not in distress. Urologic Exam  Normal circumcised penis normal meatus  Testis within normal limits  Normal rectal tone  Sessile mass anterior rectum  Prostate 30 g with no nodules  33 cc postvoid residual  Additional findings  None  Remainder the physical exam is normal  Assessment/Medical Necessity-Decision Making  Worsening nocturia most likely a combination of mild BPH and poor sleeping pattern  Family history of prostate cancer patient will have a PSA drawn within 2 weeks which will be ready for me to analyze when he comes back to see me in 1 month  Remove all caffeine from diet to see if it helps with daytime frequency  Plan  Follow-up 1 month with PSA  I also recommend patient have a sleep study or see someone for sleep disorder  Greater than 50% of 30 minutes spent consulting patient face-to-face  Orders Placed This Encounter   Procedures    PSA, Diagnostic     Standing Status:   Future     Standing Expiration Date:   1/19/2024    poct post void residual     No orders of the defined types were placed in this encounter. Heath Loja MD       Please note this report has been partially produced using speech recognition software  And may cause contain errors related to that system including grammar, punctuation and spelling as well as words and phrases that may seem inappropriate. If there are questions or concerns please feel free to contact me to clarify.

## 2023-01-20 ENCOUNTER — OFFICE VISIT (OUTPATIENT)
Dept: SURGERY | Age: 63
End: 2023-01-20

## 2023-01-20 VITALS
WEIGHT: 245 LBS | BODY MASS INDEX: 31.44 KG/M2 | OXYGEN SATURATION: 98 % | HEIGHT: 74 IN | TEMPERATURE: 97.5 F | HEART RATE: 77 BPM

## 2023-01-20 DIAGNOSIS — D12.8 ADENOMATOUS RECTAL POLYP: Primary | ICD-10-CM

## 2023-01-20 RX ORDER — SODIUM CHLORIDE 9 MG/ML
INJECTION, SOLUTION INTRAVENOUS PRN
OUTPATIENT
Start: 2023-01-20

## 2023-01-20 RX ORDER — SODIUM CHLORIDE 0.9 % (FLUSH) 0.9 %
5-40 SYRINGE (ML) INJECTION PRN
OUTPATIENT
Start: 2023-01-20

## 2023-01-20 RX ORDER — SODIUM CHLORIDE 0.9 % (FLUSH) 0.9 %
5-40 SYRINGE (ML) INJECTION EVERY 12 HOURS SCHEDULED
OUTPATIENT
Start: 2023-01-20

## 2023-01-20 ASSESSMENT — ENCOUNTER SYMPTOMS
DIARRHEA: 0
RECTAL PAIN: 0
SHORTNESS OF BREATH: 0
CHEST TIGHTNESS: 0
ABDOMINAL PAIN: 0
ANAL BLEEDING: 0
CONSTIPATION: 0
COLOR CHANGE: 0

## 2023-01-20 NOTE — PROGRESS NOTES
Subjective:      Patient ID: Vanna Duarte is a 58 y.o. male who presents for:  Chief Complaint   Patient presents with    New Patient       This is a 58-year-old male who had a colonoscopy recently. I reviewed this report. He has a villous adenoma of his rectum. He is here for surgical evaluation. Denies rectal bleeding abdominal pain or weight loss. He is on no anticoagulants. Past medical and surgical history was reviewed.           Past Medical History:   Diagnosis Date    Anxiety 7/14/2019    Hypertension     Mixed hyperlipidemia 7/27/2022    Situational depression 7/14/2019     Past Surgical History:   Procedure Laterality Date    APPENDECTOMY      CARDIAC SURGERY  12/2022    watchman placed    COLONOSCOPY N/A 12/28/2022    COLORECTAL CANCER SCREENING, w/biopsies and polypectomy performed by Gunner Baez MD at [place] [address] Right     interpartial    SKIN CANCER EXCISION  10/2022    melanoma removal     Social History     Socioeconomic History    Marital status:      Spouse name: Not on file    Number of children: Not on file    Years of education: Not on file    Highest education level: Not on file   Occupational History    Not on file   Tobacco Use    Smoking status: Never    Smokeless tobacco: Never   Vaping Use    Vaping Use: Never used   Substance and Sexual Activity    Alcohol use: No    Drug use: No    Sexual activity: Never   Other Topics Concern    Not on file   Social History Narrative    Not on file     Social Determinants of Health     Financial Resource Strain: Low Risk     Difficulty of Paying Living Expenses: Not hard at all   Food Insecurity: No Food Insecurity    Worried About Running Out of Food in the Last Year: Never true    Ran Out of Food in the Last Year: Never true   Transportation Needs: Not on file   Physical Activity: Not on file   Stress: Not on file   Social Connections: Not on file   Intimate Partner Violence: Not on file   Housing Stability: Not on file     Family History   Problem Relation Age of Onset    Other Mother         scleroderma    Cancer Father         prostate    Other Father         blood clot - lung    Colon Cancer Neg Hx      Allergies:  Oxycodone and Terazosin    Review of Systems   Constitutional:  Negative for activity change, appetite change and unexpected weight change. HENT:  Negative for congestion. Respiratory:  Negative for chest tightness and shortness of breath. Cardiovascular:  Negative for chest pain. Gastrointestinal:  Negative for abdominal pain, anal bleeding, constipation, diarrhea and rectal pain. Genitourinary:  Negative for difficulty urinating. Musculoskeletal:  Negative for arthralgias. Skin:  Negative for color change. Neurological:  Negative for dizziness and headaches. Hematological:  Does not bruise/bleed easily. Psychiatric/Behavioral:  Negative for agitation. Objective:    Pulse 77   Temp 97.5 °F (36.4 °C) (Temporal)   Ht 6' 2\" (1.88 m)   Wt 245 lb (111.1 kg)   SpO2 98%   BMI 31.46 kg/m²     Physical Exam  Constitutional:       General: He is not in acute distress. Appearance: He is well-developed. He is not diaphoretic. HENT:      Head: Normocephalic and atraumatic. Eyes:      Pupils: Pupils are equal, round, and reactive to light. Cardiovascular:      Rate and Rhythm: Normal rate and regular rhythm. Heart sounds: Normal heart sounds. Pulmonary:      Effort: Pulmonary effort is normal. No respiratory distress. Breath sounds: Normal breath sounds. No wheezing. Abdominal:      General: There is no distension. Palpations: Abdomen is soft. Tenderness: There is no abdominal tenderness. There is no guarding. Genitourinary:     Comments: Anoscopy performed. Tumor present left anterior position. Musculoskeletal:         General: No tenderness. Normal range of motion. Cervical back: Normal range of motion.    Skin:     General: Skin is warm and dry. Findings: No erythema or rash. Neurological:      Mental Status: He is alert and oriented to person, place, and time. Psychiatric:         Behavior: Behavior normal.         Thought Content: Thought content normal.         Judgment: Judgment normal.            Assessment/Plan:          Diagnosis Orders   1. Adenomatous rectal polyp          Risks and benefits of transanal excision of rectal polyp discussed. Risks of infection, bleeding, wound complications and postoperative pain addressed. Reoperation for margins was discussed as well. Nonoperative alternatives were given but not recommended. He wishes to proceed with transanal excision. Scheduled for Thursday, 1/26/2023. Bowel prep instructions given. Please note this report has beenpartially produced using speech recognition software and may cause contain errors related to that system including grammar, punctuation and spelling as well as words and phrases that may seem inappropriate.  If there arequestions or concerns please feel free to contact me to clarify

## 2023-01-25 ENCOUNTER — HOSPITAL ENCOUNTER (OUTPATIENT)
Age: 63
Setting detail: OUTPATIENT SURGERY
Discharge: HOME | End: 2023-01-25
Attending: COLON & RECTAL SURGERY | Admitting: COLON & RECTAL SURGERY
Payer: COMMERCIAL

## 2023-01-25 ENCOUNTER — ANESTHESIA EVENT (OUTPATIENT)
Dept: OPERATING ROOM | Age: 63
End: 2023-01-25
Payer: COMMERCIAL

## 2023-01-25 ENCOUNTER — ANESTHESIA (OUTPATIENT)
Dept: OPERATING ROOM | Age: 63
End: 2023-01-25
Payer: COMMERCIAL

## 2023-01-25 VITALS
SYSTOLIC BLOOD PRESSURE: 150 MMHG | HEART RATE: 69 BPM | TEMPERATURE: 97.6 F | RESPIRATION RATE: 16 BRPM | DIASTOLIC BLOOD PRESSURE: 86 MMHG | OXYGEN SATURATION: 98 %

## 2023-01-25 DIAGNOSIS — D12.8 ADENOMATOUS RECTAL POLYP: ICD-10-CM

## 2023-01-25 PROCEDURE — 7100000010 HC PHASE II RECOVERY - FIRST 15 MIN: Performed by: COLON & RECTAL SURGERY

## 2023-01-25 PROCEDURE — 2580000003 HC RX 258: Performed by: NURSE ANESTHETIST, CERTIFIED REGISTERED

## 2023-01-25 PROCEDURE — 3700000001 HC ADD 15 MINUTES (ANESTHESIA): Performed by: COLON & RECTAL SURGERY

## 2023-01-25 PROCEDURE — 2500000003 HC RX 250 WO HCPCS: Performed by: COLON & RECTAL SURGERY

## 2023-01-25 PROCEDURE — 6370000000 HC RX 637 (ALT 250 FOR IP)

## 2023-01-25 PROCEDURE — 6370000000 HC RX 637 (ALT 250 FOR IP): Performed by: COLON & RECTAL SURGERY

## 2023-01-25 PROCEDURE — 7100000011 HC PHASE II RECOVERY - ADDTL 15 MIN: Performed by: COLON & RECTAL SURGERY

## 2023-01-25 PROCEDURE — 45171 EXC RECT TUM TRANSANAL PART: CPT | Performed by: COLON & RECTAL SURGERY

## 2023-01-25 PROCEDURE — 7100000001 HC PACU RECOVERY - ADDTL 15 MIN: Performed by: COLON & RECTAL SURGERY

## 2023-01-25 PROCEDURE — 2720000010 HC SURG SUPPLY STERILE: Performed by: COLON & RECTAL SURGERY

## 2023-01-25 PROCEDURE — 6360000002 HC RX W HCPCS: Performed by: NURSE ANESTHETIST, CERTIFIED REGISTERED

## 2023-01-25 PROCEDURE — 7100000000 HC PACU RECOVERY - FIRST 15 MIN: Performed by: COLON & RECTAL SURGERY

## 2023-01-25 PROCEDURE — 88305 TISSUE EXAM BY PATHOLOGIST: CPT

## 2023-01-25 PROCEDURE — 2709999900 HC NON-CHARGEABLE SUPPLY: Performed by: COLON & RECTAL SURGERY

## 2023-01-25 PROCEDURE — 2580000003 HC RX 258: Performed by: COLON & RECTAL SURGERY

## 2023-01-25 PROCEDURE — 3700000000 HC ANESTHESIA ATTENDED CARE: Performed by: COLON & RECTAL SURGERY

## 2023-01-25 PROCEDURE — 3609021100 HC HEMORRHOIDECTOMY: Performed by: COLON & RECTAL SURGERY

## 2023-01-25 PROCEDURE — 2500000003 HC RX 250 WO HCPCS: Performed by: NURSE ANESTHETIST, CERTIFIED REGISTERED

## 2023-01-25 PROCEDURE — A4217 STERILE WATER/SALINE, 500 ML: HCPCS | Performed by: COLON & RECTAL SURGERY

## 2023-01-25 RX ORDER — METOCLOPRAMIDE HYDROCHLORIDE 5 MG/ML
10 INJECTION INTRAMUSCULAR; INTRAVENOUS
Status: DISCONTINUED | OUTPATIENT
Start: 2023-01-25 | End: 2023-01-25 | Stop reason: HOSPADM

## 2023-01-25 RX ORDER — SODIUM CHLORIDE 9 MG/ML
25 INJECTION, SOLUTION INTRAVENOUS PRN
Status: DISCONTINUED | OUTPATIENT
Start: 2023-01-25 | End: 2023-01-25 | Stop reason: HOSPADM

## 2023-01-25 RX ORDER — PROPOFOL 10 MG/ML
INJECTION, EMULSION INTRAVENOUS PRN
Status: DISCONTINUED | OUTPATIENT
Start: 2023-01-25 | End: 2023-01-25 | Stop reason: SDUPTHER

## 2023-01-25 RX ORDER — METRONIDAZOLE 500 MG/100ML
500 INJECTION, SOLUTION INTRAVENOUS ONCE
Status: COMPLETED | OUTPATIENT
Start: 2023-01-25 | End: 2023-01-25

## 2023-01-25 RX ORDER — MAGNESIUM HYDROXIDE 1200 MG/15ML
LIQUID ORAL CONTINUOUS PRN
Status: DISCONTINUED | OUTPATIENT
Start: 2023-01-25 | End: 2023-01-25 | Stop reason: HOSPADM

## 2023-01-25 RX ORDER — METRONIDAZOLE 500 MG/100ML
INJECTION, SOLUTION INTRAVENOUS
Status: COMPLETED
Start: 2023-01-25 | End: 2023-01-25

## 2023-01-25 RX ORDER — SODIUM CHLORIDE 0.9 % (FLUSH) 0.9 %
5-40 SYRINGE (ML) INJECTION EVERY 12 HOURS SCHEDULED
Status: DISCONTINUED | OUTPATIENT
Start: 2023-01-25 | End: 2023-01-25 | Stop reason: HOSPADM

## 2023-01-25 RX ORDER — ONDANSETRON 2 MG/ML
INJECTION INTRAMUSCULAR; INTRAVENOUS PRN
Status: DISCONTINUED | OUTPATIENT
Start: 2023-01-25 | End: 2023-01-25 | Stop reason: SDUPTHER

## 2023-01-25 RX ORDER — DIPHENHYDRAMINE HYDROCHLORIDE 50 MG/ML
12.5 INJECTION INTRAMUSCULAR; INTRAVENOUS
Status: DISCONTINUED | OUTPATIENT
Start: 2023-01-25 | End: 2023-01-25 | Stop reason: HOSPADM

## 2023-01-25 RX ORDER — SODIUM CHLORIDE 0.9 % (FLUSH) 0.9 %
5-40 SYRINGE (ML) INJECTION PRN
Status: DISCONTINUED | OUTPATIENT
Start: 2023-01-25 | End: 2023-01-25 | Stop reason: HOSPADM

## 2023-01-25 RX ORDER — MIDAZOLAM HYDROCHLORIDE 1 MG/ML
INJECTION INTRAMUSCULAR; INTRAVENOUS PRN
Status: DISCONTINUED | OUTPATIENT
Start: 2023-01-25 | End: 2023-01-25 | Stop reason: SDUPTHER

## 2023-01-25 RX ORDER — FENTANYL CITRATE 50 UG/ML
INJECTION, SOLUTION INTRAMUSCULAR; INTRAVENOUS PRN
Status: DISCONTINUED | OUTPATIENT
Start: 2023-01-25 | End: 2023-01-25 | Stop reason: SDUPTHER

## 2023-01-25 RX ORDER — SODIUM CHLORIDE 9 MG/ML
INJECTION, SOLUTION INTRAVENOUS PRN
Status: DISCONTINUED | OUTPATIENT
Start: 2023-01-25 | End: 2023-01-25 | Stop reason: HOSPADM

## 2023-01-25 RX ORDER — SODIUM CHLORIDE, SODIUM LACTATE, POTASSIUM CHLORIDE, CALCIUM CHLORIDE 600; 310; 30; 20 MG/100ML; MG/100ML; MG/100ML; MG/100ML
INJECTION, SOLUTION INTRAVENOUS CONTINUOUS PRN
Status: DISCONTINUED | OUTPATIENT
Start: 2023-01-25 | End: 2023-01-25 | Stop reason: SDUPTHER

## 2023-01-25 RX ORDER — MEPERIDINE HYDROCHLORIDE 25 MG/ML
12.5 INJECTION INTRAMUSCULAR; INTRAVENOUS; SUBCUTANEOUS
Status: DISCONTINUED | OUTPATIENT
Start: 2023-01-25 | End: 2023-01-25 | Stop reason: HOSPADM

## 2023-01-25 RX ORDER — OXYCODONE HYDROCHLORIDE AND ACETAMINOPHEN 5; 325 MG/1; MG/1
1 TABLET ORAL EVERY 6 HOURS PRN
Qty: 10 TABLET | Refills: 0 | Status: SHIPPED | OUTPATIENT
Start: 2023-01-25 | End: 2023-01-28

## 2023-01-25 RX ORDER — FENTANYL CITRATE 0.05 MG/ML
50 INJECTION, SOLUTION INTRAMUSCULAR; INTRAVENOUS EVERY 10 MIN PRN
Status: DISCONTINUED | OUTPATIENT
Start: 2023-01-25 | End: 2023-01-25 | Stop reason: HOSPADM

## 2023-01-25 RX ORDER — ONDANSETRON 2 MG/ML
4 INJECTION INTRAMUSCULAR; INTRAVENOUS
Status: DISCONTINUED | OUTPATIENT
Start: 2023-01-25 | End: 2023-01-25 | Stop reason: HOSPADM

## 2023-01-25 RX ORDER — ROCURONIUM BROMIDE 10 MG/ML
INJECTION, SOLUTION INTRAVENOUS PRN
Status: DISCONTINUED | OUTPATIENT
Start: 2023-01-25 | End: 2023-01-25 | Stop reason: SDUPTHER

## 2023-01-25 RX ORDER — LIDOCAINE HYDROCHLORIDE 10 MG/ML
1 INJECTION, SOLUTION EPIDURAL; INFILTRATION; INTRACAUDAL; PERINEURAL
Status: DISCONTINUED | OUTPATIENT
Start: 2023-01-25 | End: 2023-01-25 | Stop reason: HOSPADM

## 2023-01-25 RX ORDER — BUPIVACAINE HYDROCHLORIDE AND EPINEPHRINE 2.5; 5 MG/ML; UG/ML
INJECTION, SOLUTION EPIDURAL; INFILTRATION; INTRACAUDAL; PERINEURAL PRN
Status: DISCONTINUED | OUTPATIENT
Start: 2023-01-25 | End: 2023-01-25 | Stop reason: HOSPADM

## 2023-01-25 RX ADMIN — MIDAZOLAM HYDROCHLORIDE 2 MG: 1 INJECTION, SOLUTION INTRAMUSCULAR; INTRAVENOUS at 09:02

## 2023-01-25 RX ADMIN — SUGAMMADEX 200 MG: 100 INJECTION, SOLUTION INTRAVENOUS at 09:53

## 2023-01-25 RX ADMIN — SODIUM CHLORIDE, POTASSIUM CHLORIDE, SODIUM LACTATE AND CALCIUM CHLORIDE: 600; 310; 30; 20 INJECTION, SOLUTION INTRAVENOUS at 09:02

## 2023-01-25 RX ADMIN — ONDANSETRON 4 MG: 2 INJECTION INTRAMUSCULAR; INTRAVENOUS at 09:45

## 2023-01-25 RX ADMIN — FENTANYL CITRATE 50 MCG: 50 INJECTION, SOLUTION INTRAMUSCULAR; INTRAVENOUS at 09:07

## 2023-01-25 RX ADMIN — PHENYLEPHRINE HYDROCHLORIDE 100 MCG: 10 INJECTION INTRAVENOUS at 09:28

## 2023-01-25 RX ADMIN — PHENYLEPHRINE HYDROCHLORIDE 100 MCG: 10 INJECTION INTRAVENOUS at 09:26

## 2023-01-25 RX ADMIN — ROCURONIUM BROMIDE 50 MG: 10 INJECTION, SOLUTION INTRAVENOUS at 09:07

## 2023-01-25 RX ADMIN — SODIUM CHLORIDE, POTASSIUM CHLORIDE, SODIUM LACTATE AND CALCIUM CHLORIDE: 600; 310; 30; 20 INJECTION, SOLUTION INTRAVENOUS at 09:53

## 2023-01-25 RX ADMIN — PROPOFOL 200 MG: 10 INJECTION, EMULSION INTRAVENOUS at 09:07

## 2023-01-25 RX ADMIN — METRONIDAZOLE 500 MG: 500 INJECTION, SOLUTION INTRAVENOUS at 09:15

## 2023-01-25 ASSESSMENT — PAIN SCALES - GENERAL
PAINLEVEL_OUTOF10: 0
PAINLEVEL_OUTOF10: 4

## 2023-01-25 ASSESSMENT — PAIN - FUNCTIONAL ASSESSMENT: PAIN_FUNCTIONAL_ASSESSMENT: 0-10

## 2023-01-25 NOTE — PROGRESS NOTES
Dc instructions given to pt and girlfriend with RX, called and spoke with Dr Zeke Sorensen and pt is to start plavix on Friday and pt aware, eating and drinking without difficulty, pt states pain tolerable and denies wanting any medication

## 2023-01-25 NOTE — ANESTHESIA POSTPROCEDURE EVALUATION
Department of Anesthesiology  Postprocedure Note    Patient: Michael Kern  MRN: [id number]  YOB: 1960  Date of evaluation: 1/25/2023      Procedure Summary     Date: 01/25/23 Room / Location: [address]    Anesthesia Start: 0902 Anesthesia Stop: 2974    Procedure: Transanal excision rectal polyP (Rectum) Diagnosis:       Adenomatous rectal polyp      (Adenomatous rectal polyp [D12.8])    Surgeons: Geeta Lo MD Responsible Provider: Suze Farah DO    Anesthesia Type: general ASA Status: 3          Anesthesia Type: No value filed.     Mitzi Phase I: Mitzi Score: 10    Mitzi Phase II:        Anesthesia Post Evaluation    Patient location during evaluation: PACU  Patient participation: complete - patient participated  Level of consciousness: awake  Pain score: 0  Airway patency: patent  Nausea & Vomiting: no nausea and no vomiting  Complications: no  Cardiovascular status: hemodynamically stable  Respiratory status: acceptable  Hydration status: euvolemic

## 2023-01-25 NOTE — PROGRESS NOTES
Pt states feeling good and ready to go home, Dr Nunez  in room to check on pt, iv dc'd and pt getting dressed, girlfriend assisting him

## 2023-01-25 NOTE — OP NOTE
[address]                      [address], [address] [facility]                                OPERATIVE REPORT    PATIENT NAME: Angie Mart                      :        1960  MED REC NO:   [id number]                            ROOM:  ACCOUNT NO:   [de-identified]                           ADMIT DATE: 2023  PROVIDER:     Fernandez Hernández MD    DATE OF PROCEDURE:  2023    PREOPERATIVE DIAGNOSIS:  Villous adenoma, distal rectum. POSTOPERATIVE DIAGNOSIS:  Villous adenoma, distal rectum. PROCEDURE PERFORMED:  Transanal excision of villous adenoma of the  distal rectum. SURGEON:  Fernandez Hernández MD    ASSISTANT:  Ms. Manolo Banuelos. ANESTHESIA:  1. General endotracheal anesthesia. 2.  Prone. 3.  Local.    SPECIMENS:  Transanal excision distal rectum anterior, marked with a  short suture for superior and long suture lateral.    ESTIMATED BLOOD LOSS:  30 mL. COMPLICATIONS:  None. INDICATIONS:  A 59-year-old male with recent colonoscopy showed a  villous adenoma of the distal rectum. I discussed in the office the  risks and benefits of transanal excision. Risks of infection, bleeding,  recurrence and postoperative pain were discussed. Damage to the  sphincter resulting in incontinence to gas or liquid stool addressed as  well. Despite the risks, he wished to proceed. Consent obtained. OPERATIVE PROCEDURE:  He was taken to the operating room, placed in the  supine position. General endotracheal anesthesia was administered. He  was placed in a prone jackknife position. All pressure points were  properly padded. His perianal area was prepped and draped with  Betadine-containing solution. He received preoperative Flagyl. Anoscopy was performed, which confirmed the polyp, which measured  approximately 3 x 2 cm in the anterior distal rectum. I used a LigaSure  to excise to clear margins the polyp completely.   The specimen was  labeled with accordingly and sent to Pathology in formalin for permanent  section. At this time, two interrupted 3-0 chromic sutures were used to  reapproximate the distal rectal mucosa. No sphincter injury was seen  nor was the sphincter involved in the excision. Excellent hemostasis was assured. Irrigation was performed. 0.25%  Marcaine was injected for local analgesia. A piece of Surgicel was  placed in the anal canal.    The patient was placed back in supine position, extubated and taken to  Recovery for postop monitoring.         Jose Reed MD    D: 01/25/2023 9:54:43       T: 01/25/2023 9:58:16     [email or web address]  Job#: [id number]     Doc#: [id number]    CC:

## 2023-01-25 NOTE — ANESTHESIA PRE PROCEDURE
Department of Anesthesiology  Preprocedure Note       Name:  Marty Michelle   Age:  62 y.o.  :  1960                                          MRN:  [id number]         Date:  2023      Surgeon: Surgeon(s):  Chacorta Vaca MD    Procedure: Procedure(s):  Transanal excision rectal polyp, prone, bowel prep given    Medications prior to admission:   Prior to Admission medications    Medication Sig Start Date End Date Taking? Authorizing Provider   clopidogrel (PLAVIX) 75 MG tablet Take 75 mg by mouth daily    Historical Provider, MD   hydroxychloroquine (PLAQUENIL) 200 MG tablet Take by mouth 2 times daily    Historical Provider, MD   aspirin 81 MG chewable tablet Take 81 mg by mouth daily    Historical Provider, MD   hydrOXYzine pamoate (VISTARIL) 25 MG capsule Take 1 capsule by mouth 3 times daily as needed for Anxiety 22   ERON Hernandez   dilTIAZem (DILACOR XR) 240 MG extended release capsule Take 240 mg by mouth daily    Historical Provider, MD   dofetilide (TIKOSYN) 125 MCG capsule Take 125 mcg by mouth in the morning and 125 mcg before bedtime.    Historical Provider, MD   nitroGLYCERIN (NITROSTAT) 0.4 MG SL tablet Place under the tongue  Patient not taking: No sig reported 21   Historical Provider, MD       Current medications:    Current Facility-Administered Medications   Medication Dose Route Frequency Provider Last Rate Last Admin   • sodium chloride flush 0.9 % injection 5-40 mL  5-40 mL IntraVENous 2 times per day Chacorta Vaca MD       • sodium chloride flush 0.9 % injection 5-40 mL  5-40 mL IntraVENous PRN Chacorta Vaca MD       • 0.9 % sodium chloride infusion   IntraVENous PRN Chacorta Vaca MD       • metronidazole (FLAGYL) 500 mg in 0.9% NaCl 100 mL IVPB premix  500 mg IntraVENous Once Chacorta Vaca MD           Allergies:    Allergies   Allergen Reactions   • Oxycodone Hallucinations   • Terazosin Hallucinations       Problem List:   Patient Active Problem List   Diagnosis Code    Essential hypertension I10    Arthritis of knee, degenerative M17.9    Pain following surgery or procedure G89.18    PMR (polymyalgia rheumatica) (HCC) M35.3    Anxiety F41.9    Situational depression F43.21    Positive depression screening Z13.31    NSTEMI, initial episode of care ([facility] I21.4    Mixed hyperlipidemia E78.2    Other persistent atrial fibrillation ([facility] I48.19    [facility] I73.9    Colon cancer screening Z12.11    Polyp of colon K63.5    Adenomatous rectal polyp D12.8       Past Medical History:        Diagnosis Date    Anxiety 07/14/2019    Arthritis     Hypertension     Mixed hyperlipidemia 07/27/2022    Rheumatoid arthritis ([facility]     Situational depression 07/14/2019       Past Surgical History:        Procedure Laterality Date    APPENDECTOMY      CARDIAC SURGERY  12/2022    watchman placed    COLONOSCOPY N/A 12/28/2022    COLORECTAL CANCER SCREENING, w/biopsies and polypectomy performed by Ceferino Brambila MD at [address] Right     interpartial    SKIN CANCER EXCISION  10/2022    melanoma removal       Social History:    Social History     Tobacco Use    Smoking status: Never    Smokeless tobacco: Never   Substance Use Topics    Alcohol use:  No                                Counseling given: Not Answered      Vital Signs (Current):   Vitals:    01/25/23 0648   BP: 131/78   Pulse: 77   Resp: 18   Temp: 97.8 °F (36.6 °C)   TempSrc: Temporal   SpO2: 97%                                              BP Readings from Last 3 Encounters:   01/25/23 131/78   01/19/23 116/76   12/28/22 125/76       NPO Status: Time of last liquid consumption: 2000                        Time of last solid consumption: 1630                        Date of last liquid consumption: 01/24/23                        Date of last solid food consumption: 01/24/23    BMI:   Wt Readings from Last 3 Encounters:   01/20/23 245 lb (111.1 kg)   01/19/23 245 lb (111.1 kg)   12/28/22 245 lb (111.1 kg)     There is no height or weight on file to calculate BMI.    CBC:   Lab Results   Component Value Date/Time    WBC 2.7 07/27/2022 06:10 PM    WBC 3.1 08/19/2021 06:49 PM    RBC 4.19 07/27/2022 06:10 PM    RBC 4.84 08/31/2018 04:06 PM    HGB 11.8 07/27/2022 06:10 PM    HCT 36.4 07/27/2022 06:10 PM    MCV 87 07/27/2022 06:10 PM    RDW 15.0 08/19/2021 06:49 PM    PLT 88 07/27/2022 06:10 PM       CMP:   Lab Results   Component Value Date/Time     07/27/2022 06:10 PM    K 3.7 07/27/2022 06:10 PM     07/27/2022 06:10 PM    CO2 29 08/31/2018 04:06 PM    BUN 16 08/31/2018 04:06 PM    CREATININE 1.13 07/27/2022 06:10 PM    GFRAA >60 02/01/2021 06:27 PM    AGRATIO 1.6 07/09/2015 03:00 PM    LABGLOM >60 02/01/2021 06:27 PM    GLUCOSE 91 07/27/2022 06:10 PM    PROT 6.7 07/27/2022 06:10 PM    CALCIUM 9.5 07/27/2022 06:10 PM    BILITOT 0.7 07/27/2022 06:10 PM    ALKPHOS 101 07/27/2022 06:10 PM    AST 19 07/27/2022 06:10 PM    ALT 15 07/27/2022 06:10 PM       POC Tests: No results for input(s): POCGLU, POCNA, POCK, POCCL, POCBUN, POCHEMO, POCHCT in the last 72 hours.     Coags:   Lab Results   Component Value Date/Time    PROTIME 16.9 02/28/2022 09:17 AM    INR 1.5 02/28/2022 09:17 AM    APTT 35 02/28/2022 09:17 AM       HCG (If Applicable): No results found for: PREGTESTUR, PREGSERUM, HCG, HCGQUANT     ABGs: No results found for: PHART, PO2ART, ZQG1YLW, TKB8FUV, BEART, K5ZORIIJ     Type & Screen (If Applicable):  No results found for: LABABO, LABRH    Drug/Infectious Status (If Applicable):  No results found for: HIV, HEPCAB    COVID-19 Screening (If Applicable):   Lab Results   Component Value Date/Time    COVID19 NOT DETECTED 07/27/2022 06:15 PM           Anesthesia Evaluation  Patient summary reviewed and Nursing notes reviewed no history of anesthetic complications:   Airway: Mallampati: II  TM distance: >3 FB   Neck ROM: full  Mouth opening: > = 3 FB   Dental: normal exam         Pulmonary:Negative Pulmonary ROS and normal exam                               Cardiovascular:  Exercise tolerance: good (>4 METS),   (+) hypertension:, past MI:, dysrhythmias:,       ECG reviewed               Beta Blocker:  Not on Beta Blocker      ROS comment: Atrial fibrillation  - occasional ectopic ventricular beat     ABNORMAL RHYTHM       Neuro/Psych:   (+) psychiatric history:depression/anxiety             GI/Hepatic/Renal: Neg GI/Hepatic/Renal ROS            Endo/Other:    (+) blood dyscrasia: anticoagulation therapy, arthritis:., .          Pt had PAT visit. Abdominal:             Vascular:   + PVD, aortic or cerebral, . Other Findings:           Anesthesia Plan      general     ASA 3     (ETT  Discussed risk of post operative visual disturbances, facial trauma, facial edema and pressure sores)  Induction: intravenous. MIPS: Postoperative opioids intended. Anesthetic plan and risks discussed with patient. Plan discussed with CRNA.                     [address] [place] [address]   1/25/2023

## 2023-01-25 NOTE — DISCHARGE INSTRUCTIONS
Sitz bath as needed.   Recommend at least 2 to 3/day for the first 24 to 48 hours    No driving while on pain medication    Small anal packing will fall out on defecation or disintegrate during sitz bath

## 2023-01-25 NOTE — BRIEF OP NOTE
Brief Postoperative Note      Patient: Michel Ward  YOB: 1960  MRN: [id number]    Date of Procedure: 1/25/2023    Pre-Op Diagnosis: Adenomatous rectal polyp [D12.8]    Post-Op Diagnosis: Same       Procedure(s):  Transanal excision rectal polyP    Surgeon(s):  Kyle Melvin MD    Assistant:  First Assistant: Sukumar Murphy    Anesthesia: General    Estimated Blood Loss (mL): 20    Complications: None    Specimens:   ID Type Source Tests Collected by Time Destination   A : RECTAL POLYP: SHORT STICH SUPERIOR, LONG STITCH LATERAL  Tissue Anus SURGICAL PATHOLOGY Kyle Melvin MD 1/25/2023 3132        Implants:  * No implants in log *      Drains: * No LDAs found *    Findings: Anterior villous adenoma the distal rectum excised completely transanal    Electronically signed by Brina Cerda MD on 1/25/2023 at 9:46 AM

## 2023-01-27 PROBLEM — Z12.11 COLON CANCER SCREENING: Status: RESOLVED | Noted: 2022-12-28 | Resolved: 2023-01-27

## 2023-01-31 ENCOUNTER — OFFICE VISIT (OUTPATIENT)
Dept: SURGERY | Age: 63
End: 2023-01-31

## 2023-01-31 VITALS
WEIGHT: 245 LBS | OXYGEN SATURATION: 98 % | BODY MASS INDEX: 31.44 KG/M2 | HEART RATE: 83 BPM | HEIGHT: 74 IN | TEMPERATURE: 97.5 F

## 2023-01-31 DIAGNOSIS — D12.8 ADENOMATOUS RECTAL POLYP: Primary | ICD-10-CM

## 2023-01-31 PROCEDURE — 99024 POSTOP FOLLOW-UP VISIT: CPT | Performed by: COLON & RECTAL SURGERY

## 2023-01-31 NOTE — PROGRESS NOTES
Subjective:      Patient ID: Odetta Mortimer is a 58 y.o. male who presents for:  Chief Complaint   Patient presents with    Post-Op Check       He returns to the office after transanal excision of benign rectal polyp last week. He was having considerable discomfort. He has not been taking pain medication but rather focusing on Tylenol. Despite recommendations for sitz bath, he has been using the shower regularly. His bowels are working but quite uncomfortable during defecation. Histology reviewed and benign.       Past Medical History:   Diagnosis Date    Anxiety 07/14/2019    Arthritis     Hypertension     Mixed hyperlipidemia 07/27/2022    Rheumatoid arthritis ([address]     Situational depression 07/14/2019     Past Surgical History:   Procedure Laterality Date    APPENDECTOMY      CARDIAC SURGERY  12/2022    watchman placed    COLONOSCOPY N/A 12/28/2022    COLORECTAL CANCER SCREENING, w/biopsies and polypectomy performed by Kyara Stevens MD at [address] [place] [address] N/A 1/25/2023    Transanal excision rectal polyP performed by Rubio Low MD at [facility] Right     interpartial    SKIN CANCER EXCISION  10/2022    melanoma removal     Social History     Socioeconomic History    Marital status:      Spouse name: Not on file    Number of children: Not on file    Years of education: Not on file    Highest education level: Not on file   Occupational History    Not on file   Tobacco Use    Smoking status: Never    Smokeless tobacco: Never   Vaping Use    Vaping Use: Never used   Substance and Sexual Activity    Alcohol use: No    Drug use: No    Sexual activity: Never   Other Topics Concern    Not on file   Social History Narrative    Not on file     Social Determinants of Health     Financial Resource Strain: Low Risk     Difficulty of Paying Living Expenses: Not hard at all   Food Insecurity: No Food Insecurity    Worried About [address] in the Last Year: Never true    Ran Out of Food in the Last Year: Never true   Transportation Needs: Not on file   Physical Activity: Not on file   Stress: Not on file   Social Connections: Not on file   Intimate Partner Violence: Not on file   Housing Stability: Not on file     Family History   Problem Relation Age of Onset    Other Mother         scleroderma    Cancer Father         prostate    Other Father         blood clot - lung    Colon Cancer Neg Hx      Allergies:  Oxycodone and Terazosin    Review of Systems    Objective:    Pulse 83   Temp 97.5 °F (36.4 °C) (Temporal)   Ht 6' 2\" (1.88 m)   Wt 245 lb (111.1 kg)   SpO2 98%   BMI 31.46 kg/m²     Physical Exam  On exam his anal outlet shows no swelling. No thrombosis. No signs of infection or skin changes. Digital exam was deferred       Assessment/Plan:          Diagnosis Orders   1. Adenomatous rectal polyp          Pain management strategies including sitz bath's as needed as well as using pain medication as prescribed can be helpful. I discussed the simultaneous use of Colace if he is going to proceed with his narcotic prescription. I talked about topical lidocaine which can be picked over-the-counter. I will see him back in the office in 1 week. Please note this report has beenpartially produced using speech recognition software and may cause contain errors related to that system including grammar, punctuation and spelling as well as words and phrases that may seem inappropriate.  If there arequestions or concerns please feel free to contact me to clarify

## 2023-02-10 ENCOUNTER — OFFICE VISIT (OUTPATIENT)
Dept: SURGERY | Age: 63
End: 2023-02-10

## 2023-02-10 VITALS
TEMPERATURE: 97.3 F | WEIGHT: 245 LBS | HEART RATE: 75 BPM | BODY MASS INDEX: 31.44 KG/M2 | OXYGEN SATURATION: 98 % | HEIGHT: 74 IN

## 2023-02-10 DIAGNOSIS — D12.8 ADENOMATOUS RECTAL POLYP: Primary | ICD-10-CM

## 2023-02-10 PROCEDURE — 99024 POSTOP FOLLOW-UP VISIT: CPT | Performed by: COLON & RECTAL SURGERY

## 2023-02-13 ENCOUNTER — OFFICE VISIT (OUTPATIENT)
Dept: FAMILY MEDICINE CLINIC | Age: 63
End: 2023-02-13
Payer: COMMERCIAL

## 2023-02-13 VITALS
TEMPERATURE: 97.8 F | BODY MASS INDEX: 30.97 KG/M2 | WEIGHT: 241.2 LBS | OXYGEN SATURATION: 97 % | DIASTOLIC BLOOD PRESSURE: 64 MMHG | SYSTOLIC BLOOD PRESSURE: 122 MMHG | HEART RATE: 70 BPM

## 2023-02-13 DIAGNOSIS — J06.9 UPPER RESPIRATORY TRACT INFECTION, UNSPECIFIED TYPE: Primary | ICD-10-CM

## 2023-02-13 PROCEDURE — 3017F COLORECTAL CA SCREEN DOC REV: CPT | Performed by: NURSE PRACTITIONER

## 2023-02-13 PROCEDURE — G8417 CALC BMI ABV UP PARAM F/U: HCPCS | Performed by: NURSE PRACTITIONER

## 2023-02-13 PROCEDURE — G8427 DOCREV CUR MEDS BY ELIG CLIN: HCPCS | Performed by: NURSE PRACTITIONER

## 2023-02-13 PROCEDURE — 3074F SYST BP LT 130 MM HG: CPT | Performed by: NURSE PRACTITIONER

## 2023-02-13 PROCEDURE — 3078F DIAST BP <80 MM HG: CPT | Performed by: NURSE PRACTITIONER

## 2023-02-13 PROCEDURE — 1036F TOBACCO NON-USER: CPT | Performed by: NURSE PRACTITIONER

## 2023-02-13 PROCEDURE — G8482 FLU IMMUNIZE ORDER/ADMIN: HCPCS | Performed by: NURSE PRACTITIONER

## 2023-02-13 PROCEDURE — 99213 OFFICE O/P EST LOW 20 MIN: CPT | Performed by: NURSE PRACTITIONER

## 2023-02-13 RX ORDER — METOPROLOL SUCCINATE 25 MG/1
TABLET, EXTENDED RELEASE ORAL
COMMUNITY
Start: 2022-05-09

## 2023-02-13 SDOH — ECONOMIC STABILITY: HOUSING INSECURITY
IN THE LAST 12 MONTHS, WAS THERE A TIME WHEN YOU DID NOT HAVE A STEADY PLACE TO SLEEP OR SLEPT IN A SHELTER (INCLUDING NOW)?: NO

## 2023-02-13 SDOH — ECONOMIC STABILITY: INCOME INSECURITY: HOW HARD IS IT FOR YOU TO PAY FOR THE VERY BASICS LIKE FOOD, HOUSING, MEDICAL CARE, AND HEATING?: NOT HARD AT ALL

## 2023-02-13 SDOH — ECONOMIC STABILITY: FOOD INSECURITY: WITHIN THE PAST 12 MONTHS, YOU WORRIED THAT YOUR FOOD WOULD RUN OUT BEFORE YOU GOT MONEY TO BUY MORE.: NEVER TRUE

## 2023-02-13 SDOH — ECONOMIC STABILITY: FOOD INSECURITY: WITHIN THE PAST 12 MONTHS, THE FOOD YOU BOUGHT JUST DIDN'T LAST AND YOU DIDN'T HAVE MONEY TO GET MORE.: NEVER TRUE

## 2023-02-13 ASSESSMENT — PATIENT HEALTH QUESTIONNAIRE - PHQ9
SUM OF ALL RESPONSES TO PHQ QUESTIONS 1-9: 0
7. TROUBLE CONCENTRATING ON THINGS, SUCH AS READING THE NEWSPAPER OR WATCHING TELEVISION: 0
4. FEELING TIRED OR HAVING LITTLE ENERGY: 0
SUM OF ALL RESPONSES TO PHQ QUESTIONS 1-9: 0
SUM OF ALL RESPONSES TO PHQ QUESTIONS 1-9: 0
SUM OF ALL RESPONSES TO PHQ9 QUESTIONS 1 & 2: 0
3. TROUBLE FALLING OR STAYING ASLEEP: 0
6. FEELING BAD ABOUT YOURSELF - OR THAT YOU ARE A FAILURE OR HAVE LET YOURSELF OR YOUR FAMILY DOWN: 0
SUM OF ALL RESPONSES TO PHQ QUESTIONS 1-9: 0
10. IF YOU CHECKED OFF ANY PROBLEMS, HOW DIFFICULT HAVE THESE PROBLEMS MADE IT FOR YOU TO DO YOUR WORK, TAKE CARE OF THINGS AT HOME, OR GET ALONG WITH OTHER PEOPLE: 0
5. POOR APPETITE OR OVEREATING: 0
8. MOVING OR SPEAKING SO SLOWLY THAT OTHER PEOPLE COULD HAVE NOTICED. OR THE OPPOSITE, BEING SO FIGETY OR RESTLESS THAT YOU HAVE BEEN MOVING AROUND A LOT MORE THAN USUAL: 0
2. FEELING DOWN, DEPRESSED OR HOPELESS: 0
9. THOUGHTS THAT YOU WOULD BE BETTER OFF DEAD, OR OF HURTING YOURSELF: 0
1. LITTLE INTEREST OR PLEASURE IN DOING THINGS: 0

## 2023-02-13 ASSESSMENT — ENCOUNTER SYMPTOMS
SINUS PAIN: 0
WHEEZING: 0
SHORTNESS OF BREATH: 0
RHINORRHEA: 0
COUGH: 1
SINUS PRESSURE: 1
SORE THROAT: 0

## 2023-02-13 NOTE — PROGRESS NOTES
Subjective:      Patient ID: Michael Kern is a 58 y.o. male who presents today for:  Chief Complaint   Patient presents with    Otalgia     Left, x 2 days       Otalgia   There is pain in the left ear. This is a new problem. Episode onset: 2 days ago. The problem occurs constantly. The problem has been unchanged. There has been no fever. The pain is mild. Associated symptoms include coughing. Pertinent negatives include no ear discharge, headaches, hearing loss, neck pain, rhinorrhea or sore throat. He has tried nothing for the symptoms. Past Medical History:   Diagnosis Date    Anxiety 07/14/2019    Arthritis     Hypertension     Mixed hyperlipidemia 07/27/2022    Rheumatoid arthritis ([address]     Situational depression 07/14/2019     Past Surgical History:   Procedure Laterality Date    APPENDECTOMY      CARDIAC SURGERY  12/2022    watchman placed    COLONOSCOPY N/A 12/28/2022    COLORECTAL CANCER SCREENING, w/biopsies and polypectomy performed by Maryann Mir MD at [address] [place] [address] N/A 1/25/2023    Transanal excision rectal polyP performed by Geeta Lo MD at [facility] Right     interpartial    SKIN CANCER EXCISION  10/2022    melanoma removal     Family History   Problem Relation Age of Onset    Other Mother         scleroderma    Cancer Father         prostate    Other Father         blood clot - lung    Colon Cancer Neg Hx      Allergies   Allergen Reactions    Oxycodone Hallucinations    Terazosin Hallucinations         Review of Systems   Constitutional:  Negative for chills, fatigue and fever. HENT:  Positive for congestion, ear pain, postnasal drip and sinus pressure. Negative for ear discharge, hearing loss, rhinorrhea, sinus pain and sore throat. Respiratory:  Positive for cough. Negative for shortness of breath and wheezing. Cardiovascular:  Negative for chest pain. Musculoskeletal:  Negative for neck pain.    Neurological:  Negative for headaches. Objective:   /64   Pulse 70   Temp 97.8 °F (36.6 °C) (Infrared)   Wt 241 lb 3.2 oz (109.4 kg)   SpO2 97%   BMI 30.97 kg/m²     Physical Exam  Vitals reviewed. Constitutional:       General: He is not in acute distress. Appearance: He is well-developed. HENT:      Head: Normocephalic. Right Ear: Tympanic membrane, ear canal and external ear normal.      Left Ear: Ear canal and external ear normal. Tympanic membrane is scarred. Nose: Nose normal.      Mouth/Throat:      Lips: Pink. Mouth: Mucous membranes are moist.      Pharynx: Oropharynx is clear. Cardiovascular:      Rate and Rhythm: Normal rate and regular rhythm. Heart sounds: Normal heart sounds. Pulmonary:      Effort: Pulmonary effort is normal. No respiratory distress. Breath sounds: Normal breath sounds. Musculoskeletal:         General: Normal range of motion. Lymphadenopathy:      Cervical: No cervical adenopathy. Skin:     General: Skin is warm and dry. Neurological:      Mental Status: He is alert and oriented to person, place, and time. Assessment:       Diagnosis Orders   1. Upper respiratory tract infection, unspecified type              Plan:      Testing declined. No acute findings on exam. Likely viral, reviewed supportive measures for symptom management. Reviewed symptoms requiring follow up. Patient verbalizes understanding. I have reviewed and updated the electronic medical record. Return if symptoms worsen or fail to improve, for follow up with PCP.     NICK Keith - MAYRA

## 2023-03-23 ENCOUNTER — OFFICE VISIT (OUTPATIENT)
Dept: INTERNAL MEDICINE | Age: 63
End: 2023-03-23
Payer: COMMERCIAL

## 2023-03-23 VITALS
TEMPERATURE: 97 F | HEIGHT: 74 IN | OXYGEN SATURATION: 95 % | DIASTOLIC BLOOD PRESSURE: 80 MMHG | HEART RATE: 67 BPM | WEIGHT: 243.4 LBS | SYSTOLIC BLOOD PRESSURE: 122 MMHG | RESPIRATION RATE: 16 BRPM | BODY MASS INDEX: 31.24 KG/M2

## 2023-03-23 DIAGNOSIS — J06.9 UPPER RESPIRATORY TRACT INFECTION, UNSPECIFIED TYPE: ICD-10-CM

## 2023-03-23 DIAGNOSIS — I48.19 OTHER PERSISTENT ATRIAL FIBRILLATION (HCC): ICD-10-CM

## 2023-03-23 DIAGNOSIS — I25.2 HX OF NON-ST ELEVATION MYOCARDIAL INFARCTION (NSTEMI): ICD-10-CM

## 2023-03-23 DIAGNOSIS — B34.9 VIRAL ILLNESS: Primary | ICD-10-CM

## 2023-03-23 PROBLEM — K63.5 POLYP OF COLON: Status: RESOLVED | Noted: 2022-12-28 | Resolved: 2023-03-23

## 2023-03-23 PROBLEM — I21.4 NSTEMI, INITIAL EPISODE OF CARE (HCC): Status: RESOLVED | Noted: 2021-02-01 | Resolved: 2023-03-23

## 2023-03-23 PROBLEM — Z13.31 POSITIVE DEPRESSION SCREENING: Status: RESOLVED | Noted: 2019-07-14 | Resolved: 2023-03-23

## 2023-03-23 LAB
INFLUENZA A ANTIBODY: NEGATIVE
INFLUENZA B ANTIBODY: NEGATIVE
Lab: NORMAL
PERFORMING INSTRUMENT: NORMAL
QC PASS/FAIL: NORMAL
SARS-COV-2, POC: NORMAL

## 2023-03-23 PROCEDURE — 99214 OFFICE O/P EST MOD 30 MIN: CPT | Performed by: NURSE PRACTITIONER

## 2023-03-23 PROCEDURE — 3074F SYST BP LT 130 MM HG: CPT | Performed by: NURSE PRACTITIONER

## 2023-03-23 PROCEDURE — G8417 CALC BMI ABV UP PARAM F/U: HCPCS | Performed by: NURSE PRACTITIONER

## 2023-03-23 PROCEDURE — 3017F COLORECTAL CA SCREEN DOC REV: CPT | Performed by: NURSE PRACTITIONER

## 2023-03-23 PROCEDURE — G8427 DOCREV CUR MEDS BY ELIG CLIN: HCPCS | Performed by: NURSE PRACTITIONER

## 2023-03-23 PROCEDURE — 3079F DIAST BP 80-89 MM HG: CPT | Performed by: NURSE PRACTITIONER

## 2023-03-23 PROCEDURE — 1036F TOBACCO NON-USER: CPT | Performed by: NURSE PRACTITIONER

## 2023-03-23 PROCEDURE — 87426 SARSCOV CORONAVIRUS AG IA: CPT | Performed by: NURSE PRACTITIONER

## 2023-03-23 PROCEDURE — 87804 INFLUENZA ASSAY W/OPTIC: CPT | Performed by: NURSE PRACTITIONER

## 2023-03-23 PROCEDURE — G8482 FLU IMMUNIZE ORDER/ADMIN: HCPCS | Performed by: NURSE PRACTITIONER

## 2023-03-23 RX ORDER — HYDROXYZINE PAMOATE 25 MG/1
CAPSULE ORAL
COMMUNITY
Start: 2023-02-24

## 2023-03-23 RX ORDER — DOXYCYCLINE HYCLATE 100 MG
100 TABLET ORAL 2 TIMES DAILY
Qty: 20 TABLET | Refills: 0 | Status: SHIPPED | OUTPATIENT
Start: 2023-03-23 | End: 2023-04-02

## 2023-03-23 RX ORDER — FLUTICASONE PROPIONATE 50 MCG
1 SPRAY, SUSPENSION (ML) NASAL DAILY
COMMUNITY

## 2023-03-23 RX ORDER — BENZONATATE 100 MG/1
100-200 CAPSULE ORAL 3 TIMES DAILY PRN
Qty: 60 CAPSULE | Refills: 0 | Status: SHIPPED | OUTPATIENT
Start: 2023-03-23 | End: 2023-03-30

## 2023-03-23 ASSESSMENT — ENCOUNTER SYMPTOMS
NAUSEA: 0
DIARRHEA: 0
RHINORRHEA: 1
SHORTNESS OF BREATH: 0
SORE THROAT: 1
EYE DISCHARGE: 0
WHEEZING: 0
EYE ITCHING: 0
ABDOMINAL PAIN: 0
CHEST TIGHTNESS: 0
BLOOD IN STOOL: 0
COUGH: 1
SINUS PRESSURE: 1
VOMITING: 0

## 2023-03-23 NOTE — PROGRESS NOTES
daily as needed for Cough  Dispense: 60 capsule; Refill: 0    2. Viral illness  neg  - POCT COVID-19, Antigen  - POCT Influenza A/B    3. Hx of non-ST elevation myocardial infarction (NSTEMI)  hx    4. Other persistent atrial fibrillation (HCC)  Chronic, on tikosyn and plavix. Antibiotic for infection chosen after reviewing interactions with tikosyn. Need to be careful with this med. 5. Hypertension  -chronic, stable  -reminded can't take decongestants, he is using flonase only for this. Return if symptoms worsen or fail to improve.      Electronically signed by:  NICK Dailey CNP   3/23/23

## 2023-04-24 LAB
ALBUMIN (G/DL) IN SER/PLAS: 4.2 G/DL (ref 3.4–5)
ANION GAP IN SER/PLAS: 12 MMOL/L (ref 10–20)
CALCIUM (MG/DL) IN SER/PLAS: 10.1 MG/DL (ref 8.6–10.3)
CARBON DIOXIDE, TOTAL (MMOL/L) IN SER/PLAS: 28 MMOL/L (ref 21–32)
CHLORIDE (MMOL/L) IN SER/PLAS: 102 MMOL/L (ref 98–107)
CREATININE (MG/DL) IN SER/PLAS: 1 MG/DL (ref 0.5–1.3)
GFR MALE: 85 ML/MIN/1.73M2
GLUCOSE (MG/DL) IN SER/PLAS: 91 MG/DL (ref 74–99)
PHOSPHATE (MG/DL) IN SER/PLAS: 2.2 MG/DL (ref 2.5–4.9)
POTASSIUM (MMOL/L) IN SER/PLAS: 3.5 MMOL/L (ref 3.5–5.3)
SODIUM (MMOL/L) IN SER/PLAS: 138 MMOL/L (ref 136–145)
UREA NITROGEN (MG/DL) IN SER/PLAS: 23 MG/DL (ref 6–23)

## 2023-06-26 ENCOUNTER — TELEPHONE (OUTPATIENT)
Dept: INTERNAL MEDICINE | Age: 63
End: 2023-06-26

## 2023-07-26 DIAGNOSIS — F41.1 GENERALIZED ANXIETY DISORDER: ICD-10-CM

## 2023-08-11 ENCOUNTER — OFFICE VISIT (OUTPATIENT)
Dept: SURGERY | Age: 63
End: 2023-08-11
Payer: COMMERCIAL

## 2023-08-11 VITALS
TEMPERATURE: 97.7 F | BODY MASS INDEX: 31.18 KG/M2 | WEIGHT: 243 LBS | HEART RATE: 89 BPM | HEIGHT: 74 IN | OXYGEN SATURATION: 97 %

## 2023-08-11 DIAGNOSIS — D12.8 ADENOMATOUS RECTAL POLYP: Primary | ICD-10-CM

## 2023-08-11 PROCEDURE — G8427 DOCREV CUR MEDS BY ELIG CLIN: HCPCS | Performed by: COLON & RECTAL SURGERY

## 2023-08-11 PROCEDURE — 3017F COLORECTAL CA SCREEN DOC REV: CPT | Performed by: COLON & RECTAL SURGERY

## 2023-08-11 PROCEDURE — 1036F TOBACCO NON-USER: CPT | Performed by: COLON & RECTAL SURGERY

## 2023-08-11 PROCEDURE — 99213 OFFICE O/P EST LOW 20 MIN: CPT | Performed by: COLON & RECTAL SURGERY

## 2023-08-11 PROCEDURE — G8417 CALC BMI ABV UP PARAM F/U: HCPCS | Performed by: COLON & RECTAL SURGERY

## 2023-08-11 RX ORDER — SODIUM CHLORIDE 9 MG/ML
INJECTION, SOLUTION INTRAVENOUS PRN
OUTPATIENT
Start: 2023-08-11

## 2023-08-11 RX ORDER — SODIUM CHLORIDE 0.9 % (FLUSH) 0.9 %
5-40 SYRINGE (ML) INJECTION PRN
OUTPATIENT
Start: 2023-08-11

## 2023-08-11 RX ORDER — SODIUM CHLORIDE 0.9 % (FLUSH) 0.9 %
5-40 SYRINGE (ML) INJECTION EVERY 12 HOURS SCHEDULED
OUTPATIENT
Start: 2023-08-11

## 2023-08-11 ASSESSMENT — ENCOUNTER SYMPTOMS
BACK PAIN: 1
ABDOMINAL PAIN: 0
CHEST TIGHTNESS: 0

## 2023-08-11 NOTE — PROGRESS NOTES
There is no guarding. Genitourinary:     Comments: No swelling in the perianal area. No signs of significant hemorrhoids. Musculoskeletal:         General: No tenderness. Normal range of motion. Cervical back: Normal range of motion. Skin:     General: Skin is warm and dry. Findings: No erythema or rash. Neurological:      Mental Status: He is alert and oriented to person, place, and time. Psychiatric:         Behavior: Behavior normal.         Thought Content: Thought content normal.         Judgment: Judgment normal.            Assessment/Plan:          Diagnosis Orders   1. Adenomatous rectal polyp          Discussed the risks and benefits of flexible sigmoidoscopy for evaluation of any recurrence. No bowel prep is required. He understands the risks and benefits. He wishes to proceed. Fleets enema on arrival.    Flexible sigmoidoscopy for evaluation of previous rectal polyp excision site. Please note this report has beenpartially produced using speech recognition software and may cause contain errors related to that system including grammar, punctuation and spelling as well as words and phrases that may seem inappropriate.  If there arequestions or concerns please feel free to contact me to clarify

## 2023-08-14 ENCOUNTER — HOSPITAL ENCOUNTER (OUTPATIENT)
Age: 63
Setting detail: OUTPATIENT SURGERY
Discharge: HOME | End: 2023-08-14
Attending: COLON & RECTAL SURGERY | Admitting: COLON & RECTAL SURGERY
Payer: COMMERCIAL

## 2023-08-14 ENCOUNTER — ANESTHESIA (OUTPATIENT)
Dept: OPERATING ROOM | Age: 63
End: 2023-08-14
Payer: COMMERCIAL

## 2023-08-14 ENCOUNTER — ANESTHESIA EVENT (OUTPATIENT)
Dept: OPERATING ROOM | Age: 63
End: 2023-08-14
Payer: COMMERCIAL

## 2023-08-14 VITALS
DIASTOLIC BLOOD PRESSURE: 61 MMHG | RESPIRATION RATE: 14 BRPM | SYSTOLIC BLOOD PRESSURE: 110 MMHG | HEART RATE: 64 BPM | BODY MASS INDEX: 31.18 KG/M2 | TEMPERATURE: 97 F | WEIGHT: 243 LBS | HEIGHT: 74 IN | OXYGEN SATURATION: 95 %

## 2023-08-14 PROBLEM — Z91.89 HIGH RISK FOR COLON CANCER: Status: ACTIVE | Noted: 2023-08-14

## 2023-08-14 PROCEDURE — 7100000001 HC PACU RECOVERY - ADDTL 15 MIN: Performed by: COLON & RECTAL SURGERY

## 2023-08-14 PROCEDURE — 2580000003 HC RX 258: Performed by: COLON & RECTAL SURGERY

## 2023-08-14 PROCEDURE — 3609008400 HC SIGMOIDOSCOPY DIAGNOSTIC: Performed by: COLON & RECTAL SURGERY

## 2023-08-14 PROCEDURE — 7100000000 HC PACU RECOVERY - FIRST 15 MIN: Performed by: COLON & RECTAL SURGERY

## 2023-08-14 PROCEDURE — A4217 STERILE WATER/SALINE, 500 ML: HCPCS | Performed by: COLON & RECTAL SURGERY

## 2023-08-14 PROCEDURE — 45330 DIAGNOSTIC SIGMOIDOSCOPY: CPT | Performed by: COLON & RECTAL SURGERY

## 2023-08-14 PROCEDURE — 6360000002 HC RX W HCPCS: Performed by: REGISTERED NURSE

## 2023-08-14 PROCEDURE — 7100000010 HC PHASE II RECOVERY - FIRST 15 MIN: Performed by: COLON & RECTAL SURGERY

## 2023-08-14 PROCEDURE — 2709999900 HC NON-CHARGEABLE SUPPLY: Performed by: COLON & RECTAL SURGERY

## 2023-08-14 PROCEDURE — 3700000000 HC ANESTHESIA ATTENDED CARE: Performed by: COLON & RECTAL SURGERY

## 2023-08-14 RX ORDER — ONDANSETRON 2 MG/ML
4 INJECTION INTRAMUSCULAR; INTRAVENOUS
Status: DISCONTINUED | OUTPATIENT
Start: 2023-08-14 | End: 2023-08-14 | Stop reason: HOSPADM

## 2023-08-14 RX ORDER — MEPERIDINE HYDROCHLORIDE 25 MG/ML
12.5 INJECTION INTRAMUSCULAR; INTRAVENOUS; SUBCUTANEOUS
Status: DISCONTINUED | OUTPATIENT
Start: 2023-08-14 | End: 2023-08-14 | Stop reason: HOSPADM

## 2023-08-14 RX ORDER — MAGNESIUM HYDROXIDE 1200 MG/15ML
LIQUID ORAL CONTINUOUS PRN
Status: DISCONTINUED | OUTPATIENT
Start: 2023-08-14 | End: 2023-08-14 | Stop reason: HOSPADM

## 2023-08-14 RX ORDER — FENTANYL CITRATE 0.05 MG/ML
50 INJECTION, SOLUTION INTRAMUSCULAR; INTRAVENOUS EVERY 10 MIN PRN
Status: DISCONTINUED | OUTPATIENT
Start: 2023-08-14 | End: 2023-08-14 | Stop reason: HOSPADM

## 2023-08-14 RX ORDER — DIPHENHYDRAMINE HYDROCHLORIDE 50 MG/ML
12.5 INJECTION INTRAMUSCULAR; INTRAVENOUS
Status: DISCONTINUED | OUTPATIENT
Start: 2023-08-14 | End: 2023-08-14 | Stop reason: HOSPADM

## 2023-08-14 RX ORDER — SODIUM CHLORIDE 0.9 % (FLUSH) 0.9 %
5-40 SYRINGE (ML) INJECTION EVERY 12 HOURS SCHEDULED
Status: DISCONTINUED | OUTPATIENT
Start: 2023-08-14 | End: 2023-08-14 | Stop reason: HOSPADM

## 2023-08-14 RX ORDER — SODIUM CHLORIDE 0.9 % (FLUSH) 0.9 %
5-40 SYRINGE (ML) INJECTION PRN
Status: DISCONTINUED | OUTPATIENT
Start: 2023-08-14 | End: 2023-08-14 | Stop reason: HOSPADM

## 2023-08-14 RX ORDER — OXYCODONE HYDROCHLORIDE 5 MG/1
5 TABLET ORAL
Status: DISCONTINUED | OUTPATIENT
Start: 2023-08-14 | End: 2023-08-14 | Stop reason: HOSPADM

## 2023-08-14 RX ORDER — METOCLOPRAMIDE HYDROCHLORIDE 5 MG/ML
10 INJECTION INTRAMUSCULAR; INTRAVENOUS
Status: DISCONTINUED | OUTPATIENT
Start: 2023-08-14 | End: 2023-08-14 | Stop reason: HOSPADM

## 2023-08-14 RX ORDER — SODIUM CHLORIDE 9 MG/ML
INJECTION, SOLUTION INTRAVENOUS PRN
Status: DISCONTINUED | OUTPATIENT
Start: 2023-08-14 | End: 2023-08-14 | Stop reason: HOSPADM

## 2023-08-14 RX ORDER — PROPOFOL 10 MG/ML
INJECTION, EMULSION INTRAVENOUS PRN
Status: DISCONTINUED | OUTPATIENT
Start: 2023-08-14 | End: 2023-08-14 | Stop reason: SDUPTHER

## 2023-08-14 RX ADMIN — SODIUM CHLORIDE: 9 INJECTION, SOLUTION INTRAVENOUS at 06:22

## 2023-08-14 RX ADMIN — PROPOFOL 50 MG: 10 INJECTION, EMULSION INTRAVENOUS at 07:32

## 2023-08-14 RX ADMIN — PROPOFOL 150 MG: 10 INJECTION, EMULSION INTRAVENOUS at 07:31

## 2023-08-14 ASSESSMENT — PAIN DESCRIPTION - LOCATION: LOCATION: ABDOMEN

## 2023-08-14 ASSESSMENT — PAIN SCALES - GENERAL: PAINLEVEL_OUTOF10: 0

## 2023-08-14 NOTE — ANESTHESIA PRE PROCEDURE
anesthetic complications:   Airway: Mallampati: II  TM distance: >3 FB   Neck ROM: full  Mouth opening: > = 3 FB   Dental: normal exam         Pulmonary:Negative Pulmonary ROS and normal exam                               Cardiovascular:    (+) hypertension:, CABG/stent:, dysrhythmias:,                   Neuro/Psych:               GI/Hepatic/Renal:   (+) morbid obesity          Endo/Other:                     Abdominal:   (+) obese,           Vascular: negative vascular ROS.  + PVD, aortic or cerebral, . Other Findings:           Anesthesia Plan      MAC     ASA 3             Anesthetic plan and risks discussed with patient. Plan discussed with CRNA.     Attending anesthesiologist reviewed and agrees with Preprocedure content                Kristen Blum MD   8/14/2023

## 2023-08-14 NOTE — ANESTHESIA POSTPROCEDURE EVALUATION
Department of Anesthesiology  Postprocedure Note    Patient: Lupe Yee  MRN: [id number]  YOB: 1960  Date of evaluation: 8/14/2023      Procedure Summary     Date: 08/14/23 Room / Location: [address]    Anesthesia Start: 0990 Anesthesia Stop: 8475    Procedure: Flexible sigmoidoscopy Diagnosis:       Adenomatous rectal polyp      (Adenomatous rectal polyp [D12.8])    Surgeons: Vinay Mckeon MD Responsible Provider: Brittany Yi MD    Anesthesia Type: MAC ASA Status: 3          Anesthesia Type: No value filed.     Mitzi Phase I: Mitzi Score: 8    Mitzi Phase II:        Anesthesia Post Evaluation    Patient location during evaluation: bedside  Patient participation: complete - patient participated  Level of consciousness: awake and awake and alert  Airway patency: patent  Nausea & Vomiting: no nausea and no vomiting  Complications: no  Cardiovascular status: blood pressure returned to baseline and hemodynamically stable  Respiratory status: acceptable  Hydration status: euvolemic  Pain management: adequate

## 2023-09-01 RX ORDER — HYDROXYZINE PAMOATE 25 MG/1
CAPSULE ORAL
Qty: 60 CAPSULE | Refills: 1 | OUTPATIENT
Start: 2023-09-01

## 2023-09-12 PROBLEM — R07.9 CHEST PAIN: Status: ACTIVE | Noted: 2023-09-12

## 2023-09-12 PROBLEM — I48.19 PERSISTENT ATRIAL FIBRILLATION (MULTI): Status: ACTIVE | Noted: 2023-09-12

## 2023-09-12 PROBLEM — I65.23 BILATERAL CAROTID ARTERY STENOSIS: Status: ACTIVE | Noted: 2023-09-12

## 2023-09-12 PROBLEM — R60.0 LOWER EXTREMITY EDEMA: Status: ACTIVE | Noted: 2023-09-12

## 2023-09-12 PROBLEM — Z98.61 CAD S/P PERCUTANEOUS CORONARY ANGIOPLASTY: Status: ACTIVE | Noted: 2023-09-12

## 2023-09-12 PROBLEM — I25.2 HISTORY OF NON-ST ELEVATION MYOCARDIAL INFARCTION (NSTEMI): Status: ACTIVE | Noted: 2023-09-12

## 2023-09-12 PROBLEM — I73.9 PAD (PERIPHERAL ARTERY DISEASE) (CMS-HCC): Status: ACTIVE | Noted: 2023-09-12

## 2023-09-12 PROBLEM — Z79.899 HIGH RISK MEDICATION USE: Status: ACTIVE | Noted: 2023-09-12

## 2023-09-12 PROBLEM — Z79.899 ON DOFETILIDE THERAPY: Status: ACTIVE | Noted: 2023-09-12

## 2023-09-12 PROBLEM — I10 ESSENTIAL HYPERTENSION: Status: ACTIVE | Noted: 2023-09-12

## 2023-09-12 PROBLEM — E87.6 HYPOKALEMIA: Status: ACTIVE | Noted: 2023-09-12

## 2023-09-12 PROBLEM — M06.9 RHEUMATOID ARTHRITIS (MULTI): Status: ACTIVE | Noted: 2023-09-12

## 2023-09-12 PROBLEM — E66.09 CLASS 1 OBESITY DUE TO EXCESS CALORIES WITH BODY MASS INDEX (BMI) OF 31.0 TO 31.9 IN ADULT: Status: ACTIVE | Noted: 2023-09-12

## 2023-09-12 PROBLEM — E66.09 CLASS 1 OBESITY DUE TO EXCESS CALORIES WITH BODY MASS INDEX (BMI) OF 30.0 TO 30.9 IN ADULT: Status: ACTIVE | Noted: 2023-09-12

## 2023-09-12 PROBLEM — I25.10 CAD S/P PERCUTANEOUS CORONARY ANGIOPLASTY: Status: ACTIVE | Noted: 2023-09-12

## 2023-09-12 PROBLEM — E78.2 MIXED HYPERLIPIDEMIA: Status: ACTIVE | Noted: 2023-09-12

## 2023-09-12 PROBLEM — E66.811 CLASS 1 OBESITY DUE TO EXCESS CALORIES WITH BODY MASS INDEX (BMI) OF 30.0 TO 30.9 IN ADULT: Status: ACTIVE | Noted: 2023-09-12

## 2023-09-12 PROBLEM — Z95.818 PRESENCE OF WATCHMAN LEFT ATRIAL APPENDAGE CLOSURE DEVICE: Status: ACTIVE | Noted: 2023-09-12

## 2023-09-12 PROBLEM — Z86.73 HISTORY OF CVA (CEREBROVASCULAR ACCIDENT): Status: ACTIVE | Noted: 2023-09-12

## 2023-09-12 PROBLEM — Z78.9 NEVER SMOKED ANY SUBSTANCE: Status: ACTIVE | Noted: 2023-09-12

## 2023-09-12 PROBLEM — E66.811 CLASS 1 OBESITY DUE TO EXCESS CALORIES WITH BODY MASS INDEX (BMI) OF 31.0 TO 31.9 IN ADULT: Status: ACTIVE | Noted: 2023-09-12

## 2023-09-12 PROBLEM — M35.3 POLYMYALGIA RHEUMATICA (MULTI): Status: ACTIVE | Noted: 2023-09-12

## 2023-09-12 PROBLEM — G45.9 TIA (TRANSIENT ISCHEMIC ATTACK): Status: ACTIVE | Noted: 2023-09-12

## 2023-09-12 RX ORDER — ASPIRIN 81 MG/1
1 TABLET ORAL DAILY
COMMUNITY
Start: 2023-01-06

## 2023-09-12 RX ORDER — DOFETILIDE 0.12 MG/1
1 CAPSULE ORAL 2 TIMES DAILY
COMMUNITY
Start: 2022-06-24 | End: 2023-10-16 | Stop reason: SINTOL

## 2023-09-12 RX ORDER — POTASSIUM CHLORIDE 750 MG/1
1 TABLET, FILM COATED, EXTENDED RELEASE ORAL DAILY
COMMUNITY
End: 2023-10-16 | Stop reason: SINTOL

## 2023-09-12 RX ORDER — HYDROCHLOROTHIAZIDE 25 MG/1
1 TABLET ORAL DAILY
COMMUNITY
End: 2023-10-05 | Stop reason: ALTCHOICE

## 2023-09-12 RX ORDER — NITROGLYCERIN 0.4 MG/1
1 TABLET SUBLINGUAL AS NEEDED
COMMUNITY
Start: 2021-02-01 | End: 2024-01-31 | Stop reason: SDUPTHER

## 2023-09-12 RX ORDER — CLOPIDOGREL BISULFATE 75 MG/1
1 TABLET ORAL DAILY
COMMUNITY
Start: 2023-01-06 | End: 2023-10-16 | Stop reason: SINTOL

## 2023-09-12 RX ORDER — FUROSEMIDE 20 MG/1
1 TABLET ORAL DAILY
COMMUNITY
End: 2023-10-16 | Stop reason: SINTOL

## 2023-09-12 RX ORDER — CALCIUM CARBONATE 300MG(750)
1 TABLET,CHEWABLE ORAL 2 TIMES DAILY
COMMUNITY
End: 2023-10-16 | Stop reason: SDUPTHER

## 2023-09-12 RX ORDER — HYDROXYZINE PAMOATE 25 MG/1
1 CAPSULE ORAL 3 TIMES DAILY PRN
COMMUNITY
End: 2023-10-16 | Stop reason: SINTOL

## 2023-09-12 RX ORDER — FLUTICASONE PROPIONATE 50 MCG
2 SPRAY, SUSPENSION (ML) NASAL DAILY
COMMUNITY
Start: 2017-05-09

## 2023-09-12 RX ORDER — DILTIAZEM HYDROCHLORIDE 240 MG/1
1 CAPSULE, EXTENDED RELEASE ORAL DAILY
COMMUNITY
Start: 2022-06-24 | End: 2023-10-16 | Stop reason: SINTOL

## 2023-09-12 RX ORDER — HYDROXYCHLOROQUINE SULFATE 200 MG/1
1 TABLET, FILM COATED ORAL DAILY
COMMUNITY
End: 2023-10-16 | Stop reason: SINTOL

## 2023-09-12 RX ORDER — PREDNISONE 10 MG/1
TABLET ORAL
COMMUNITY
Start: 2022-06-16 | End: 2023-10-16 | Stop reason: SINTOL

## 2023-10-05 ENCOUNTER — TELEPHONE (OUTPATIENT)
Dept: CARDIOLOGY | Facility: CLINIC | Age: 63
End: 2023-10-05
Payer: COMMERCIAL

## 2023-10-05 DIAGNOSIS — I10 ESSENTIAL HYPERTENSION: ICD-10-CM

## 2023-10-05 RX ORDER — AMLODIPINE BESYLATE 2.5 MG/1
2.5 TABLET ORAL DAILY
Qty: 90 TABLET | Refills: 3 | Status: SHIPPED | OUTPATIENT
Start: 2023-10-05 | End: 2024-01-31 | Stop reason: SINTOL

## 2023-10-05 NOTE — TELEPHONE ENCOUNTER
Message   Recorded as Task   Date: 09/27/2023 02:46 PM, Created By: Corrie Hendrix   Task Name: Call Back   Assigned To: Ally Mckay   Regarding Patient: GOKUL SUMNER, Status: Active   Comment:   Corrie Hendrix - 27 Sep 2023 2:46 PM    TASK CREATED  Patient called and stated his feet have been feeling like pins and needles. He read that HCTZ can cause this and wants to know if you want him to stop it and try something else or continue. [phone]   Ally Mckay - 29 Sep 2023 3:33 PM    TASK EDITED  9/29 Per Dr. Alberto Franco MD, [facility] , discontinue HCTZ.   START Amlodipine 2.5 mg daily.  Call placed to patient and left voice mail message requesting return call for Dr. Alberto Franco MD, [facility] recommendations. Rx will need sent to pharmacy once patient is informed.        Orders  Essential hypertension    · Stop: hydroCHLOROthiazide 25 MG Oral Tablet  Rx By: Alberto Franco; Dispense: 90 Days ; #:90 Tablet; Refill: 3;   For: Essential hypertension; ERUM = N; Verified Transmission to [facility]; Last Updated By: Ally Mckay; 4/11/2023 8:34:41 AM   · Start: amLODIPine Besylate 2.5 MG Oral Tablet; TAKE 1 TABLET DAILY  Rx By: Alberto Franco; Dispense: 90 Days ; #:90 Tablet; Refill: 3;   For: Essential hypertension; ERUM = N; Record; Last Updated By: Ally Mckay    Signatures  Electronically signed by : HUE [email or web address]; Sep 29 2023  3:35PM EST (Author)      10/5  Call again placed to patient and advised.  Patient verbalized understanding.  Rx to be sent to [facility].

## 2023-10-16 ENCOUNTER — OFFICE VISIT (OUTPATIENT)
Dept: CARDIOLOGY | Facility: CLINIC | Age: 63
End: 2023-10-16
Payer: COMMERCIAL

## 2023-10-16 VITALS
HEART RATE: 75 BPM | DIASTOLIC BLOOD PRESSURE: 64 MMHG | HEIGHT: 74 IN | BODY MASS INDEX: 30.93 KG/M2 | WEIGHT: 241 LBS | SYSTOLIC BLOOD PRESSURE: 120 MMHG

## 2023-10-16 DIAGNOSIS — G60.9 IDIOPATHIC PERIPHERAL NEUROPATHY: ICD-10-CM

## 2023-10-16 DIAGNOSIS — I48.19 PERSISTENT ATRIAL FIBRILLATION (MULTI): Primary | ICD-10-CM

## 2023-10-16 PROCEDURE — 1036F TOBACCO NON-USER: CPT | Performed by: NURSE PRACTITIONER

## 2023-10-16 PROCEDURE — 3078F DIAST BP <80 MM HG: CPT | Performed by: NURSE PRACTITIONER

## 2023-10-16 PROCEDURE — 99214 OFFICE O/P EST MOD 30 MIN: CPT | Performed by: NURSE PRACTITIONER

## 2023-10-16 PROCEDURE — 3074F SYST BP LT 130 MM HG: CPT | Performed by: NURSE PRACTITIONER

## 2023-10-16 RX ORDER — DOFETILIDE 0.25 MG/1
1 CAPSULE ORAL 2 TIMES DAILY
COMMUNITY
Start: 2023-09-20 | End: 2024-01-31 | Stop reason: SDUPTHER

## 2023-10-16 RX ORDER — CALCIUM CARBONATE 300MG(750)
1 TABLET,CHEWABLE ORAL 2 TIMES DAILY
Qty: 180 TABLET | Refills: 3 | Status: SHIPPED | OUTPATIENT
Start: 2023-10-16 | End: 2024-10-15

## 2023-10-16 RX ORDER — MULTIVITAMIN/IRON/FOLIC ACID 18MG-0.4MG
1 TABLET ORAL DAILY
COMMUNITY

## 2023-10-16 RX ORDER — LOSARTAN POTASSIUM 100 MG/1
100 TABLET ORAL DAILY
COMMUNITY
Start: 2023-09-30 | End: 2024-03-28 | Stop reason: SDUPTHER

## 2023-10-16 NOTE — PROGRESS NOTES
CARDIOLOGY OFFICE VISIT      CHIEF COMPLAINT  Chief Complaint   Patient presents with    Atrial Fibrillation     Routine check up       HISTORY OF PRESENT ILLNESS  HPI  The patient is a 63-year-old  male who is followed for persistent atrial fibrillation controlled on dofetilide and magnesium oxide and not anticoagulated status postplacement of a number 24 mm watchman left atrial appendage filter on January 6, 2023.  He was seen in the office by Dr. Sanz on July 13, 2023 and the dofetilide dosage was increased to 250 mcg twice a day.  The patient states that he is now experiencing numbness in the toes on both feet.  He also has noted that he has increased urination throughout the day time and nighttime hours.  He reports that he can hear his heart beating in his ears and does admit to some sinus congestion.  He denies palpitations, chest pain, dizziness, lightheadedness, abdominal distention, shortness of breath, or lower extremity edema.  Past Medical History  Past Medical History:   Diagnosis Date    Acute serous otitis media, left ear 05/19/2017    Acute serous otitis media, left ear    Body mass index (BMI) 33.0-33.9, adult 03/15/2022    BMI 33.0-33.9,adult    Encounter for preprocedural cardiovascular examination 02/22/2022    Preop cardiovascular exam    Other chest pain 11/04/2021    Chest pain, non-cardiac    Other specified counseling 03/15/2022    Encounter for medication counseling    Other specified symptoms and signs involving the circulatory and respiratory systems 11/04/2021    Bruit of right carotid artery    Other specified symptoms and signs involving the circulatory and respiratory systems 11/04/2021    Decreased pedal pulses    Pain in leg, unspecified 11/04/2021    Leg pain    Person consulting for explanation of examination or test findings 03/15/2022    Encounter to discuss test results    Persons encountering health services in other specified circumstances 02/22/2022    Encounter to  establish care with new doctor       Social History  Social History     Tobacco Use    Smoking status: Never    Smokeless tobacco: Never   Substance Use Topics    Alcohol use: Not Currently    Drug use: Not Currently       Family History     Family History   Problem Relation Name Age of Onset    Scleroderma Mother      Other (scleroderma involving lung) Mother      Prostate cancer Father      Coronary artery disease Other Family hx         Allergies:  Allergies   Allergen Reactions    Diltiazem Unknown    Metoprolol Succinate Unknown    Oxycodone Unknown        Outpatient Medications:  Current Outpatient Medications   Medication Instructions    amLODIPine (NORVASC) 2.5 mg, oral, Daily    aspirin 81 mg EC tablet 1 tablet, oral, Daily    b complex 0.4 mg tablet 1 tablet, oral, Daily    dofetilide (Tikosyn) 250 mcg capsule 1 capsule, oral, 2 times daily    fluticasone (Flonase) 50 mcg/actuation nasal spray 2 sprays, Each Nostril, Daily    losartan (COZAAR) 100 mg, oral, Daily    magnesium oxide (Mag-Ox) 400 mg tablet 1 tablet, oral, 2 times daily    nitroglycerin (Nitrostat) 0.4 mg SL tablet 1 tablet, sublingual, As needed          REVIEW OF SYSTEMS  Review of Systems   All other systems reviewed and are negative.        VITALS  Vitals:    10/16/23 0853   BP: 120/64   Pulse: 75       PHYSICAL EXAM  Vitals and nursing note reviewed.   Constitutional:       Appearance: Normal appearance.   HENT:      Head: Normocephalic.   Neck:      Vascular: No JVD.   Cardiovascular:      Rate and Rhythm: Normal rate and regular rhythm.      Pulses: Normal pulses.      Heart sounds: Normal heart sounds.   Pulmonary:      Effort: Pulmonary effort is normal.      Breath sounds: Normal breath sounds.   Abdominal:      General: Bowel sounds are normal.      Palpations: Abdomen is soft.   Musculoskeletal:         General: Normal range of motion.      Cervical back: Normal range of motion.   Skin:     General: Skin is warm and dry.    Neurological:      General: No focal deficit present.      Mental Status: She is alert and oriented to person, place, and time.      Motor: Motor function is intact.   Psychiatric:         Attention and Perception: Attention and perception normal.         Mood and Affect: Mood and affect normal.         Speech: Speech normal.         Behavior: Behavior normal. Behavior is cooperative.         Thought Content: Thought content normal.         Cognition and Memory: Cognition and memory normal.     Labs and testing: Twelve-lead EKG reveals sinus rhythm without ectopics and no acute ischemic changes.  QRS durations 98 ms,  ms, QTc 433 ms.      ASSESSMENT AND PLAN      Clinical impressions:  1. Persistent atrial fibrillation on dofetilide, magnesium oxide and not anticoagulated status post # 24 mm watchman left atrial appendage occlusion device on January 6, 2023 for prophylaxis from thromboembolic event.  2. Coronary artery disease status post PTCA with drug-eluting stent to the mid LAD on February 1, 2021 with 40% proximal LAD, 10% proximal and mid circumflex, 10 to 30% mid RCA stenosis managed medically.  3. Lower extremity edema.  4. Polymyalgia rheumatica.  5. Remote TIA with no ongoing neurological deficits.  6. Dyslipidemia on statin.  7. Carotid artery stenosis with duplex scan dated November 10, 2021 revealing less than 50% bilateral internal carotid artery stenosis.  8. Class I obesity with a BMI 31.07.    Recommendations:  1.  The patient will obtain a Chem-7 and a hemoglobin A1c for evaluation of blood sugar with complaints of frequent urination and peripheral neuropathy.  Further recommendations will be pending those results.  2.  Patient was instructed that he may try a decongestant such as Mucinex or Allegra.  Patient was instructed to refrain from diphenhydramine or Sudafed due to risk for proarrhythmia.  He will continue all other medications as prescribed.  3.  Follow-up in office with Dr. Sanz  in 6 months or sooner if needed.  4.  Follow-up in office with Dr. Franco on April 24, 2024 at 7:45 AM as scheduled or sooner if needed.  5.  Continue lifestyle modifications as discussed.    Evaluation and note by Nia Estes CNP  **Please excuse any errors in grammar or translation related to this dictation.  Voice recognition software was utilized to prepare this document.**

## 2023-10-18 ENCOUNTER — LAB (OUTPATIENT)
Dept: LAB | Facility: LAB | Age: 63
End: 2023-10-18
Payer: COMMERCIAL

## 2023-10-18 DIAGNOSIS — G60.9 IDIOPATHIC PERIPHERAL NEUROPATHY: ICD-10-CM

## 2023-10-18 LAB
ANION GAP SERPL CALC-SCNC: 9 MMOL/L (ref 10–20)
BUN SERPL-MCNC: 20 MG/DL (ref 6–23)
CALCIUM SERPL-MCNC: 9.8 MG/DL (ref 8.6–10.3)
CHLORIDE SERPL-SCNC: 104 MMOL/L (ref 98–107)
CO2 SERPL-SCNC: 30 MMOL/L (ref 21–32)
CREAT SERPL-MCNC: 1.17 MG/DL (ref 0.5–1.3)
GFR SERPL CREATININE-BSD FRML MDRD: 70 ML/MIN/1.73M*2
GLUCOSE SERPL-MCNC: 81 MG/DL (ref 74–99)
POTASSIUM SERPL-SCNC: 4.2 MMOL/L (ref 3.5–5.3)
SODIUM SERPL-SCNC: 139 MMOL/L (ref 136–145)

## 2023-10-18 PROCEDURE — 83036 HEMOGLOBIN GLYCOSYLATED A1C: CPT

## 2023-10-18 PROCEDURE — 80048 BASIC METABOLIC PNL TOTAL CA: CPT

## 2023-10-18 PROCEDURE — 36415 COLL VENOUS BLD VENIPUNCTURE: CPT

## 2023-10-19 LAB
EST. AVERAGE GLUCOSE BLD GHB EST-MCNC: 91 MG/DL
HBA1C MFR BLD: 4.8 %

## 2023-12-27 ENCOUNTER — TELEPHONE (OUTPATIENT)
Dept: CARDIOLOGY | Facility: HOSPITAL | Age: 63
End: 2023-12-27
Payer: COMMERCIAL

## 2023-12-27 NOTE — TELEPHONE ENCOUNTER
Pt called for 1 year follow up post WATCHMAN. Pt denies any cardiac events, stroke, bleeding events, or hospitalizations.if so breifely describe. Currently on 81mg aspirin, Continues to follow up with PCP or Cardiolgists. Instructed to continue 81mg Asprin indefinetly. Instructed to call  with any questions or concerns.

## 2024-01-31 ENCOUNTER — TELEPHONE (OUTPATIENT)
Dept: CARDIOLOGY | Facility: CLINIC | Age: 64
End: 2024-01-31

## 2024-01-31 ENCOUNTER — OFFICE VISIT (OUTPATIENT)
Dept: CARDIOLOGY | Facility: CLINIC | Age: 64
End: 2024-01-31
Payer: COMMERCIAL

## 2024-01-31 VITALS
HEIGHT: 75 IN | SYSTOLIC BLOOD PRESSURE: 138 MMHG | HEART RATE: 103 BPM | WEIGHT: 246 LBS | DIASTOLIC BLOOD PRESSURE: 82 MMHG | BODY MASS INDEX: 30.59 KG/M2

## 2024-01-31 VITALS
BODY MASS INDEX: 30.66 KG/M2 | SYSTOLIC BLOOD PRESSURE: 116 MMHG | HEART RATE: 62 BPM | DIASTOLIC BLOOD PRESSURE: 70 MMHG | HEIGHT: 75 IN | WEIGHT: 246.6 LBS

## 2024-01-31 DIAGNOSIS — Z95.818 PRESENCE OF WATCHMAN LEFT ATRIAL APPENDAGE CLOSURE DEVICE: ICD-10-CM

## 2024-01-31 DIAGNOSIS — Z98.61 CAD S/P PERCUTANEOUS CORONARY ANGIOPLASTY: ICD-10-CM

## 2024-01-31 DIAGNOSIS — I10 ESSENTIAL HYPERTENSION: ICD-10-CM

## 2024-01-31 DIAGNOSIS — Z79.899 ON DOFETILIDE THERAPY: ICD-10-CM

## 2024-01-31 DIAGNOSIS — E66.09 CLASS 1 OBESITY DUE TO EXCESS CALORIES WITH BODY MASS INDEX (BMI) OF 30.0 TO 30.9 IN ADULT, UNSPECIFIED WHETHER SERIOUS COMORBIDITY PRESENT: ICD-10-CM

## 2024-01-31 DIAGNOSIS — E78.2 MIXED HYPERLIPIDEMIA: ICD-10-CM

## 2024-01-31 DIAGNOSIS — I65.23 BILATERAL CAROTID ARTERY STENOSIS: ICD-10-CM

## 2024-01-31 DIAGNOSIS — I25.10 CAD S/P PERCUTANEOUS CORONARY ANGIOPLASTY: ICD-10-CM

## 2024-01-31 DIAGNOSIS — I73.9 PAD (PERIPHERAL ARTERY DISEASE) (CMS-HCC): ICD-10-CM

## 2024-01-31 DIAGNOSIS — Z82.49 FAMILY HISTORY OF ISCHEMIC HEART DISEASE: ICD-10-CM

## 2024-01-31 DIAGNOSIS — I25.2 HISTORY OF NON-ST ELEVATION MYOCARDIAL INFARCTION (NSTEMI): ICD-10-CM

## 2024-01-31 DIAGNOSIS — Z86.73 HISTORY OF CVA (CEREBROVASCULAR ACCIDENT): ICD-10-CM

## 2024-01-31 DIAGNOSIS — M06.9 RHEUMATOID ARTHRITIS, INVOLVING UNSPECIFIED SITE, UNSPECIFIED WHETHER RHEUMATOID FACTOR PRESENT (MULTI): ICD-10-CM

## 2024-01-31 DIAGNOSIS — R07.9 CHEST PAIN, UNSPECIFIED TYPE: ICD-10-CM

## 2024-01-31 DIAGNOSIS — I48.19 PERSISTENT ATRIAL FIBRILLATION (MULTI): ICD-10-CM

## 2024-01-31 DIAGNOSIS — Z78.9 NEVER SMOKED ANY SUBSTANCE: ICD-10-CM

## 2024-01-31 DIAGNOSIS — I48.91 ATRIAL FIBRILLATION WITH RVR (MULTI): Primary | ICD-10-CM

## 2024-01-31 PROCEDURE — 99214 OFFICE O/P EST MOD 30 MIN: CPT | Performed by: INTERNAL MEDICINE

## 2024-01-31 PROCEDURE — 99214 OFFICE O/P EST MOD 30 MIN: CPT | Performed by: NURSE PRACTITIONER

## 2024-01-31 PROCEDURE — 3075F SYST BP GE 130 - 139MM HG: CPT | Performed by: NURSE PRACTITIONER

## 2024-01-31 PROCEDURE — 1036F TOBACCO NON-USER: CPT | Performed by: NURSE PRACTITIONER

## 2024-01-31 PROCEDURE — 1036F TOBACCO NON-USER: CPT | Performed by: INTERNAL MEDICINE

## 2024-01-31 PROCEDURE — 3008F BODY MASS INDEX DOCD: CPT | Performed by: INTERNAL MEDICINE

## 2024-01-31 PROCEDURE — 3079F DIAST BP 80-89 MM HG: CPT | Performed by: NURSE PRACTITIONER

## 2024-01-31 PROCEDURE — 3074F SYST BP LT 130 MM HG: CPT | Performed by: INTERNAL MEDICINE

## 2024-01-31 PROCEDURE — 3008F BODY MASS INDEX DOCD: CPT | Performed by: NURSE PRACTITIONER

## 2024-01-31 PROCEDURE — 3078F DIAST BP <80 MM HG: CPT | Performed by: INTERNAL MEDICINE

## 2024-01-31 RX ORDER — ATENOLOL 25 MG/1
25 TABLET ORAL DAILY
Qty: 30 TABLET | Refills: 11 | Status: SHIPPED | OUTPATIENT
Start: 2024-01-31 | End: 2025-01-30

## 2024-01-31 RX ORDER — DOFETILIDE 0.25 MG/1
CAPSULE ORAL
Qty: 270 CAPSULE | Refills: 3 | Status: SHIPPED | OUTPATIENT
Start: 2024-01-31

## 2024-01-31 RX ORDER — NITROGLYCERIN 0.4 MG/1
0.4 TABLET SUBLINGUAL AS NEEDED
Qty: 25 TABLET | Refills: 5 | Status: SHIPPED | OUTPATIENT
Start: 2024-01-31

## 2024-01-31 ASSESSMENT — ENCOUNTER SYMPTOMS: DIAPHORESIS: 1

## 2024-01-31 NOTE — PROGRESS NOTES
"CARDIOLOGY OFFICE VISIT      CHIEF COMPLAINT  No chief complaint on file.    Chief complaint: \"I cannot tell my heart is out of rhythm.\"  HISTORY OF PRESENT ILLNESS  HPI  History: The patient is a 63-year-old  male who is followed for persistent atrial fibrillation on dofetilide and magnesium oxide and not anticoagulated status postplacement of a number 24 mm watchman left atrial appendage filter on January 6, 2023.  Patient was seen in the office today by Dr. Franco and noted to be in atrial fibrillation with rapid ventricular response.  He presents to the office for evaluation.  Patient denies palpitations, chest pain, dizziness, lightheadedness, shortness of breath, abdominal distention, or lower extremity edema.  Patient states that he has been slightly more tired recently.  He also states that he has been experiencing extreme constipation and wonders if one of his medications is causing the issue.  He states that he has been straining considerably to have a bowel movement.  Patient states that he has also noted some intermittent lower extremity swelling.  He is accompanied by his wife for today's office visit.  Past Medical History  Past Medical History:   Diagnosis Date    Acute serous otitis media, left ear 05/19/2017    Acute serous otitis media, left ear    Body mass index (BMI) 33.0-33.9, adult 03/15/2022    BMI 33.0-33.9,adult    Encounter for preprocedural cardiovascular examination 02/22/2022    Preop cardiovascular exam    Other chest pain 11/04/2021    Chest pain, non-cardiac    Other specified counseling 03/15/2022    Encounter for medication counseling    Other specified symptoms and signs involving the circulatory and respiratory systems 11/04/2021    Bruit of right carotid artery    Other specified symptoms and signs involving the circulatory and respiratory systems 11/04/2021    Decreased pedal pulses    Pain in leg, unspecified 11/04/2021    Leg pain    Person consulting for " explanation of examination or test findings 03/15/2022    Encounter to discuss test results    Persons encountering health services in other specified circumstances 02/22/2022    Encounter to establish care with new doctor       Social History  Social History     Tobacco Use    Smoking status: Never    Smokeless tobacco: Never   Substance Use Topics    Alcohol use: Not Currently     Comment: very rarely    Drug use: Not Currently       Family History     Family History   Problem Relation Name Age of Onset    Scleroderma Mother      Other (scleroderma involving lung) Mother      Prostate cancer Father      Coronary artery disease Other Family hx         Allergies:  Allergies   Allergen Reactions    Diltiazem Unknown    Metoprolol Succinate Unknown    Oxycodone Unknown        Outpatient Medications:  Current Outpatient Medications   Medication Instructions    amLODIPine (NORVASC) 2.5 mg, oral, Daily    aspirin 81 mg EC tablet 1 tablet, oral, Daily    b complex 0.4 mg tablet 1 tablet, oral, Daily    dofetilide (Tikosyn) 250 mcg capsule 1 capsule, oral, 2 times daily    fluticasone (Flonase) 50 mcg/actuation nasal spray 2 sprays, Each Nostril, Daily    losartan (COZAAR) 100 mg, oral, Daily    magnesium oxide (MAG-OX) 400 mg, oral, 2 times daily    nitroglycerin (NITROSTAT) 0.4 mg, sublingual, As needed          REVIEW OF SYSTEMS  Review of Systems   All other systems reviewed and are negative.        VITALS  There were no vitals filed for this visit.    PHYSICAL EXAM  Vitals and nursing note reviewed.   Constitutional:       Appearance: Normal appearance.   HENT:      Head: Normocephalic.   Neck:      Vascular: No JVD.   Cardiovascular:      Rate and Rhythm: Tachycardic and irregular rhythm.      Pulses: Normal pulses.      Heart sounds: Normal heart sounds.   Pulmonary:      Effort: Pulmonary effort is normal.      Breath sounds: Normal breath sounds.   Abdominal:      General: Bowel sounds are normal.      Palpations:  Abdomen is soft.   Musculoskeletal:         General: Normal range of motion.      Cervical back: Normal range of motion.   Skin:     General: Skin is warm and dry.   Neurological:      General: No focal deficit present.      Mental Status: She is alert and oriented to person, place, and time.      Motor: Motor function is intact.   Psychiatric:         Attention and Perception: Attention and perception normal.         Mood and Affect: Mood and affect normal.         Speech: Speech normal.         Behavior: Behavior normal. Behavior is cooperative.         Thought Content: Thought content normal.         Cognition and Memory: Cognition and memory normal.        Labs and testing: Twelve-lead EKG dated January 31, 2024 reveals atrial fibrillation with rapid ventricular response, ventricular rate 103 bpm.  QRS duration 90 ms,  ms, QTc 463 ms.  No acute ischemic changes or infarct patterns are noted.    ASSESSMENT AND PLAN       Clinical impressions:  1. Persistent atrial fibrillation on dofetilide, magnesium oxide and not anticoagulated status post # 24 mm watchman left atrial appendage occlusion device on January 6, 2023 for prophylaxis from thromboembolic event.  2. Coronary artery disease status post PTCA with drug-eluting stent to the mid LAD on February 1, 2021 with 40% proximal LAD, 10% proximal and mid circumflex, 10 to 30% mid RCA stenosis managed medically.  3. Lower extremity edema.  4. Polymyalgia rheumatica.  5. Remote TIA with no ongoing neurological deficits.  6. Dyslipidemia on statin.  7. Carotid artery stenosis with duplex scan dated November 10, 2021 revealing less than 50% bilateral internal carotid artery stenosis.  8. Class I obesity with a BMI 30.75.    Recommendations:  1.  Patient's creatinine clearance is 102.37.  The QTc on his twelve-lead EKG today is 463 ms.  Patient was instructed to increase his morning dose of dofetilide to 500 mcg and continue 250 mcg in the evening.  He will return  to the [facility] office on Monday, February 5, 2024 for twelve-lead EKG.  If the patient remains in atrial fibrillation we will recommend a IVAN guided cardioversion with Dr. Sanz.  2.  Continue all other medications as prescribed.  The patient will discontinue the amlodipine due to complaints of extreme constipation as well as lower extremity swelling.  He was placed on atenolol 25 mg 1 tablet daily at bedtime.  Patient does have an allergy to metoprolol.  When asked his symptoms he believes he had scalp itching.  I would not recommend a calcium channel blocker due to the patient's aforementioned side effects.  Further recommendations will be pending the clinical course.  3.  Continue lifestyle modifications as discussed.    Evaluation and note by Nia Estes CNP  **Please excuse any errors in grammar or translation related to this dictation.  Voice recognition software was utilized to prepare this document.**

## 2024-01-31 NOTE — PROGRESS NOTES
CARDIOLOGY OFFICE VISIT      CHIEF COMPLAINT      HISTORY OF PRESENT ILLNESS  The patient is being seen today because he has not been feeling well recently.  He states that at least twice a week if not more recently he is get a feeling where he is very warm all over his body and has marked diaphoresis.  He denies any other symptoms with this.  He denies chest discomfort or symptoms of myocardial ischemia.  He denies any Setoprime with dyspnea.  He is unaware of any significant palpitations.  He denies presyncope and syncope.  He has not been placed on any new medications.  There is no pattern related to these episodes he was complaining about.    EKG: Atrial fibrillation with a rapid ventricular spots, 103 bpm, corrected QT interval 463 ms, results discussed with patient  IMPRESSION:  1.Carotid artery disease, asymptomatic  2. Essential hypertension,  3. Positive family history of coronary artery disease.  4. Hyperlipidemia  5. Obesity  6.  Longstanding persistent atrial fibrillation  7. Polymyalgia rheumatica  8.Possible remote small CVA  9. Non-ST elevation acute coronary syndrome February 1, 2021  10. Coronary artery disease  11. PCI with TATIANA of mid LAD on 2/1/2021  12. Rheumatoid arthritis  13. Watchman, January 2023 at [facility]. Plans to discontinue Plavix therapy in July 2023  14. Persistent Atrial Fib- Following Dr. Sanz  Intolerable to Diltiazem- caused peripheral edema. January 2023  Intolerable to Metoprolol- caused dizziness and coughing. February 2023     Please excuse any errors in grammar or translation related to this dictation. Voice recognition software was utilized to prepare this document       Past Medical History  Past Medical History:   Diagnosis Date    Acute serous otitis media, left ear 05/19/2017    Acute serous otitis media, left ear    Body mass index (BMI) 33.0-33.9, adult 03/15/2022    BMI 33.0-33.9,adult    Encounter for preprocedural cardiovascular examination 02/22/2022    Preop  cardiovascular exam    Other chest pain 11/04/2021    Chest pain, non-cardiac    Other specified counseling 03/15/2022    Encounter for medication counseling    Other specified symptoms and signs involving the circulatory and respiratory systems 11/04/2021    Bruit of right carotid artery    Other specified symptoms and signs involving the circulatory and respiratory systems 11/04/2021    Decreased pedal pulses    Pain in leg, unspecified 11/04/2021    Leg pain    Person consulting for explanation of examination or test findings 03/15/2022    Encounter to discuss test results    Persons encountering health services in other specified circumstances 02/22/2022    Encounter to establish care with new doctor       Social History  Social History     Tobacco Use    Smoking status: Never    Smokeless tobacco: Never   Substance Use Topics    Alcohol use: Not Currently     Comment: very rarely    Drug use: Not Currently       Family History     Family History   Problem Relation Name Age of Onset    Scleroderma Mother      Other (scleroderma involving lung) Mother      Prostate cancer Father      Coronary artery disease Other Family hx         Allergies:  Allergies   Allergen Reactions    Diltiazem Unknown    Metoprolol Succinate Unknown    Oxycodone Unknown        Outpatient Medications:  Current Outpatient Medications   Medication Instructions    amLODIPine (NORVASC) 2.5 mg, oral, Daily    aspirin 81 mg EC tablet 1 tablet, oral, Daily    b complex 0.4 mg tablet 1 tablet, oral, Daily    dofetilide (Tikosyn) 250 mcg capsule 1 capsule, oral, 2 times daily    fluticasone (Flonase) 50 mcg/actuation nasal spray 2 sprays, Each Nostril, Daily    losartan (COZAAR) 100 mg, oral, Daily    magnesium oxide (MAG-OX) 400 mg, oral, 2 times daily    nitroglycerin (Nitrostat) 0.4 mg SL tablet 1 tablet, sublingual, As needed          REVIEW OF SYSTEMS  Review of Systems   Constitutional: Positive for diaphoresis.   All other systems reviewed  and are negative.        VITALS  Vitals:    01/31/24 0757   BP: 116/70   Pulse: 62       PHYSICAL EXAM  Constitutional:       Appearance: Healthy appearance. Not in distress.   Eyes:      Conjunctiva/sclera: Conjunctivae normal.      Pupils: Pupils are equal, round, and reactive to light.   Neck:      Vascular: No JVR. JVD normal.   Pulmonary:      Effort: Pulmonary effort is normal.      Breath sounds: Normal breath sounds. No wheezing. No rhonchi. No rales.   Chest:      Chest wall: Not tender to palpatation.   Cardiovascular:      PMI at left midclavicular line. Normal rate. Regular rhythm. Normal S1. Normal S2.       Murmurs: There is no murmur.      No gallop.  No click. No rub.   Pulses:     Intact distal pulses.   Edema:     Peripheral edema absent.   Abdominal:      Tenderness: There is no abdominal tenderness.   Musculoskeletal: Normal range of motion.         General: No tenderness.      Cervical back: Normal range of motion. Skin:     General: Skin is warm and dry.   Neurological:      General: No focal deficit present.      Mental Status: Alert and oriented to person, place and time.         ASSESSMENT AND PLAN      [unfilled]

## 2024-01-31 NOTE — TELEPHONE ENCOUNTER
[facility] left message that pt needs prior auth on  Dofetilide 250 mcg.  Pt has new insurance [employer] prompting the prior auth.  Please call [phone]. ID # [id number].    Group: [id number]  Routed to Rosy BREWER RN

## 2024-02-05 ENCOUNTER — ANCILLARY PROCEDURE (OUTPATIENT)
Dept: CARDIOLOGY | Facility: CLINIC | Age: 64
End: 2024-02-05
Payer: COMMERCIAL

## 2024-02-05 DIAGNOSIS — I48.91 ATRIAL FIBRILLATION WITH RVR (MULTI): ICD-10-CM

## 2024-02-05 NOTE — PROGRESS NOTES
Pt here for EKG ordered by Nia Busby CNP for afib, med change. Per Nia, pt in NSR and measurements are good. Pt to continue same medications as prescribed.

## 2024-03-28 DIAGNOSIS — I10 ESSENTIAL HYPERTENSION: ICD-10-CM

## 2024-03-28 RX ORDER — LOSARTAN POTASSIUM 100 MG/1
100 TABLET ORAL DAILY
Qty: 90 TABLET | Refills: 2 | Status: SHIPPED | OUTPATIENT
Start: 2024-03-28

## 2024-03-28 NOTE — TELEPHONE ENCOUNTER
Received request for prescription refills for patient.   Patient follows with Dr. Alberto Franco      Last OV 1/31/24  Next OV 4/24/2024    Pended for signing and sent to provider

## 2024-04-16 ENCOUNTER — OFFICE VISIT (OUTPATIENT)
Dept: CARDIOLOGY | Facility: CLINIC | Age: 64
End: 2024-04-16
Payer: COMMERCIAL

## 2024-04-16 VITALS
HEART RATE: 67 BPM | BODY MASS INDEX: 30.84 KG/M2 | WEIGHT: 248 LBS | HEIGHT: 75 IN | DIASTOLIC BLOOD PRESSURE: 88 MMHG | SYSTOLIC BLOOD PRESSURE: 136 MMHG

## 2024-04-16 DIAGNOSIS — Z79.899 ON DOFETILIDE THERAPY: ICD-10-CM

## 2024-04-16 DIAGNOSIS — Z95.818 PRESENCE OF WATCHMAN LEFT ATRIAL APPENDAGE CLOSURE DEVICE: ICD-10-CM

## 2024-04-16 DIAGNOSIS — I10 ESSENTIAL HYPERTENSION: ICD-10-CM

## 2024-04-16 DIAGNOSIS — Z71.89 ENCOUNTER FOR MEDICATION REVIEW AND COUNSELING: ICD-10-CM

## 2024-04-16 DIAGNOSIS — I25.10 CAD S/P PERCUTANEOUS CORONARY ANGIOPLASTY: ICD-10-CM

## 2024-04-16 DIAGNOSIS — E66.09 CLASS 1 OBESITY DUE TO EXCESS CALORIES WITH BODY MASS INDEX (BMI) OF 31.0 TO 31.9 IN ADULT, UNSPECIFIED WHETHER SERIOUS COMORBIDITY PRESENT: ICD-10-CM

## 2024-04-16 DIAGNOSIS — E78.2 MIXED HYPERLIPIDEMIA: ICD-10-CM

## 2024-04-16 DIAGNOSIS — Z98.61 CAD S/P PERCUTANEOUS CORONARY ANGIOPLASTY: ICD-10-CM

## 2024-04-16 DIAGNOSIS — Z71.89 ENCOUNTER TO DISCUSS TREATMENT OPTIONS: ICD-10-CM

## 2024-04-16 DIAGNOSIS — I48.19 PERSISTENT ATRIAL FIBRILLATION (MULTI): Primary | ICD-10-CM

## 2024-04-16 DIAGNOSIS — Z78.9 NEVER SMOKED ANY SUBSTANCE: ICD-10-CM

## 2024-04-16 DIAGNOSIS — I73.9 PAD (PERIPHERAL ARTERY DISEASE) (CMS-HCC): ICD-10-CM

## 2024-04-16 PROCEDURE — 3075F SYST BP GE 130 - 139MM HG: CPT | Performed by: INTERNAL MEDICINE

## 2024-04-16 PROCEDURE — 99214 OFFICE O/P EST MOD 30 MIN: CPT | Performed by: INTERNAL MEDICINE

## 2024-04-16 PROCEDURE — 93000 ELECTROCARDIOGRAM COMPLETE: CPT | Performed by: INTERNAL MEDICINE

## 2024-04-16 PROCEDURE — 3008F BODY MASS INDEX DOCD: CPT | Performed by: INTERNAL MEDICINE

## 2024-04-16 PROCEDURE — 1036F TOBACCO NON-USER: CPT | Performed by: INTERNAL MEDICINE

## 2024-04-16 PROCEDURE — 3078F DIAST BP <80 MM HG: CPT | Performed by: INTERNAL MEDICINE

## 2024-04-16 ASSESSMENT — ENCOUNTER SYMPTOMS
NEAR-SYNCOPE: 0
IRREGULAR HEARTBEAT: 0
DYSPNEA ON EXERTION: 0
DIZZINESS: 0
PALPITATIONS: 0
SHORTNESS OF BREATH: 0
SYNCOPE: 0

## 2024-04-16 NOTE — PATIENT INSTRUCTIONS
Continue same medications/treatment.  Patient educated on proper medication use.  Patient educated on risk factor modification.  Please bring any lab results from other providers/physicians to your next appointment.    Please bring all medicines, vitamins, and herbal supplements with you when you come to the office.    Prescriptions will not be filled unless you are compliant with your follow up appointments or have a follow up appointment scheduled as per instruction of your physician. Refills should be requested at the time of your visit.    Follow up with Nia in 6 months with EKG    I, DEIDRE WATKINS RN, AM SCRIBING FOR AND IN THE PRESENCE OF DR. ALEJANDRO FIELDS MD, FACC, FACP, FHRS

## 2024-04-16 NOTE — PROGRESS NOTES
"Chief Complaint:   Follow-up (6 month f/u)     History Of Present Illness:    Ton Almaraz is a 63 y.o. male presenting with followup.  He is accompanied by his wife.    His arrhythmia symptoms.  He denies any palpitation, lightheadedness, near-syncope, or syncope.  He self-discontinued  atenolol as he was having increased urination and some dizziness.     Last Recorded Vitals:  Vitals:    04/16/24 0757   BP: 150/76   BP Location: Right arm   Patient Position: Sitting   Pulse: 67   Weight: 112 kg (248 lb)   Height: 1.905 m (6' 3\")     Vitals:    04/16/24 0817   BP: 136/88   Pulse:      Repeat blood pressure performed.  He had a stressful situation at the service garage earlier today.  After sitting in the office, blood pressure improved to 136/88    Past Medical History:  See list    Past Surgical History:  See list      Social History:  He reports that he has never smoked. He has never used smokeless tobacco. He reports that he does not currently use alcohol. He reports that he does not currently use drugs.    Family History:  Family History   Problem Relation Name Age of Onset    Scleroderma Mother      Other (scleroderma involving lung) Mother      Prostate cancer Father      Coronary artery disease Other Family hx         Allergies:  Diltiazem, Metoprolol succinate, and Oxycodone    Outpatient Medications:  Current Outpatient Medications   Medication Instructions    aspirin 81 mg EC tablet 1 tablet, oral, Daily    atenolol (TENORMIN) 25 mg, oral, Daily    b complex 0.4 mg tablet 1 tablet, oral, Daily    dofetilide (Tikosyn) 250 mcg capsule Take 2 capsules in the am and one in the pm    fluticasone (Flonase) 50 mcg/actuation nasal spray 2 sprays, Each Nostril, Daily    losartan (COZAAR) 100 mg, oral, Daily    magnesium oxide (MAG-OX) 400 mg, oral, 2 times daily    nitroglycerin (NITROSTAT) 0.4 mg, sublingual, As needed   Review of Systems   Constitutional: Negative for malaise/fatigue.   Cardiovascular:  " Negative for chest pain, dyspnea on exertion, irregular heartbeat, near-syncope, palpitations and syncope.   Respiratory:  Negative for shortness of breath.    Neurological:  Negative for dizziness.   All other systems reviewed and are negative.        Physical Exam:  Constitutional:       General: Awake.      Appearance: Normal and healthy appearance. Well-developed and not in distress.   Neck:      Vascular: No JVR. JVD normal.   Pulmonary:      Effort: Pulmonary effort is normal.      Breath sounds: Normal breath sounds. No wheezing. No rhonchi. No rales.   Chest:      Chest wall: Not tender to palpatation.   Cardiovascular:      PMI at left midclavicular line. Normal rate. Regular rhythm. Normal S1. Normal S2.       Murmurs: There is no murmur.      No gallop.  No click. No rub.   Pulses:     Intact distal pulses.   Edema:     Peripheral edema absent.   Abdominal:      Tenderness: There is no abdominal tenderness.   Musculoskeletal: Normal range of motion.         General: No tenderness. Skin:     General: Skin is warm and dry.   Neurological:      General: No focal deficit present.      Mental Status: Alert and oriented to person, place and time.            Last Labs:  CBC -  Lab Results   Component Value Date    WBC 4.0 (L) 06/26/2023    HGB 14.0 06/26/2023    HCT 41.7 06/26/2023    MCV 86 06/26/2023    PLT 60 (L) 06/26/2023       CMP -  Lab Results   Component Value Date    CALCIUM 9.8 10/18/2023    PHOS 2.2 (L) 04/24/2023    PROT 6.9 06/26/2023    ALBUMIN 4.1 06/26/2023    AST 22 06/26/2023    ALT 17 06/26/2023    ALKPHOS 77 06/26/2023    BILITOT 1.0 06/26/2023       LIPID PANEL -   Lab Results   Component Value Date    CHOL 119 04/13/2022    TRIG 138 04/13/2022    HDL 32.0 (A) 04/13/2022    CHHDL 3.7 04/13/2022    LDLF 59 04/13/2022    VLDL 28 04/13/2022       RENAL FUNCTION PANEL -   Lab Results   Component Value Date    GLUCOSE 81 10/18/2023     10/18/2023    K 4.2 10/18/2023     10/18/2023     CO2 30 10/18/2023    ANIONGAP 9 (L) 10/18/2023    BUN 20 10/18/2023    CREATININE 1.17 10/18/2023    GFRMALE 72 06/26/2023    CALCIUM 9.8 10/18/2023    PHOS 2.2 (L) 04/24/2023    ALBUMIN 4.1 06/26/2023        Lab Results   Component Value Date     (H) 06/26/2023    HGBA1C 4.8 10/18/2023       Last Cardiology Tests:  ECG:  ECG: Today. NSR. Normal axis. Corrected  ms.       Lab review: I have personally reviewed the laboratory result(s) see above    Assessment/Plan   Diagnoses and all orders for this visit:  Persistent atrial fibrillation (Multi)  -     Follow Up In Cardiology  CAD S/P percutaneous coronary angioplasty  PAD (peripheral artery disease) (CMS-HCC)  Essential hypertension  Mixed hyperlipidemia  Presence of Watchman left atrial appendage closure device  On dofetilide therapy  Never smoked any substance  Class 1 obesity due to excess calories with body mass index (BMI) of 31.0 to 31.9 in adult, unspecified whether serious comorbidity present  Encounter for medication review and counseling  Encounter to discuss treatment options        Persistent atrial fibrillation, s/p remote CVNs. s/p drug load with dofetilide then repeat CVN. Clinically maintaining NSR with dofetilide.  He opted for conservative therapy in the past and deferred any decision for cryoablation.  Will continue conservative therapy.  Reviewed meds. Continue meds.  Discussed refills.    Status post watchman left atrial appendage occlusion device. Follows with Dr. Uribe  High risk med dofetilide. Continue longterm.  Monitor ECG twice yearly.  Discussed refill.  Coronary artery diease. Chronic. Stable, s/p remote Non-ST elevation acute coronary syndrome. s/p PCI TATIANA of mid LAD on 2/1/2021. Asymptomatic with medical management.  Continue medications.  Self discontinued beta-blocker due to side effects.  Carotid artery disease, asymptomatic. Chronic. Stable. Follows with cardiology  Essential hypertension, chronic. Stable. Reviewed  medications. Continue medications. Discussed refills.  Positive family history of coronary artery disease.  Hyperlipidemia, chronic. Stable. Reviewed medications. On statin.   Polymyalgia rheumatica. Chronic.  Possible remote small CVA. Stable.  Venous insufficiency.  Lower extremity edema improved.  Behavior modification discussed.  Offered compression stockings  Lumbar disc disease.  Has neuropathy.  Follows with chiropractor by his report  Overweight  AHA recommendations for exercise, diet, and behavioral modification reviewed with pt.     The patient, wife, and I discussed the mechanism of arrhythmia, atrial fibrillation, Watchman device in place, ECG, dofetilide, opted for conservative therapy and no ablation, indications for and types of medications, discussion if and what medication refills needed, treatment options, risks, benefits, and imponderables. American Heart Association lifestyle changes and behavioral modification discussed. All questions answered in detail. Counseling over 50% visit regarding above. Patient appreciative of care.    Claire Sanz MD

## 2024-05-21 ENCOUNTER — HOSPITAL ENCOUNTER (OUTPATIENT)
Dept: NEUROLOGY | Facility: HOSPITAL | Age: 64
Discharge: HOME | End: 2024-05-21
Payer: COMMERCIAL

## 2024-05-21 DIAGNOSIS — M54.17 RADICULOPATHY, LUMBOSACRAL REGION: ICD-10-CM

## 2024-05-21 DIAGNOSIS — M79.672 PAIN IN LEFT FOOT: ICD-10-CM

## 2024-05-21 DIAGNOSIS — M79.671 PAIN IN RIGHT FOOT: ICD-10-CM

## 2024-05-21 DIAGNOSIS — M79.2 NEURALGIA AND NEURITIS, UNSPECIFIED: ICD-10-CM

## 2024-05-21 PROCEDURE — 95910 NRV CNDJ TEST 7-8 STUDIES: CPT

## 2024-06-12 ENCOUNTER — TRANSCRIBE ORDERS (OUTPATIENT)
Dept: ORTHOPEDIC SURGERY | Facility: HOSPITAL | Age: 64
End: 2024-06-12
Payer: COMMERCIAL

## 2024-06-12 DIAGNOSIS — M54.50 LOW BACK PAIN, UNSPECIFIED BACK PAIN LATERALITY, UNSPECIFIED CHRONICITY, UNSPECIFIED WHETHER SCIATICA PRESENT: ICD-10-CM

## 2024-06-14 ENCOUNTER — HOSPITAL ENCOUNTER (OUTPATIENT)
Dept: RADIOLOGY | Facility: CLINIC | Age: 64
Discharge: HOME | End: 2024-06-14
Payer: COMMERCIAL

## 2024-06-14 ENCOUNTER — APPOINTMENT (OUTPATIENT)
Dept: ORTHOPEDIC SURGERY | Facility: CLINIC | Age: 64
End: 2024-06-14
Payer: COMMERCIAL

## 2024-06-14 VITALS — BODY MASS INDEX: 31.83 KG/M2 | WEIGHT: 248 LBS | HEIGHT: 74 IN

## 2024-06-14 DIAGNOSIS — M43.16 SPONDYLOLISTHESIS OF LUMBAR REGION: ICD-10-CM

## 2024-06-14 DIAGNOSIS — M54.2 CERVICAL PAIN (NECK): ICD-10-CM

## 2024-06-14 DIAGNOSIS — M54.12 CERVICAL RADICULOPATHY: Primary | ICD-10-CM

## 2024-06-14 DIAGNOSIS — M51.36 DEGENERATIVE DISC DISEASE, LUMBAR: ICD-10-CM

## 2024-06-14 DIAGNOSIS — M54.50 LOW BACK PAIN, UNSPECIFIED BACK PAIN LATERALITY, UNSPECIFIED CHRONICITY, UNSPECIFIED WHETHER SCIATICA PRESENT: ICD-10-CM

## 2024-06-14 DIAGNOSIS — M54.16 LUMBAR RADICULOPATHY: ICD-10-CM

## 2024-06-14 PROCEDURE — 72110 X-RAY EXAM L-2 SPINE 4/>VWS: CPT

## 2024-06-14 PROCEDURE — 3008F BODY MASS INDEX DOCD: CPT

## 2024-06-14 PROCEDURE — 99204 OFFICE O/P NEW MOD 45 MIN: CPT

## 2024-06-14 PROCEDURE — 72050 X-RAY EXAM NECK SPINE 4/5VWS: CPT

## 2024-06-14 RX ORDER — GABAPENTIN 300 MG/1
300 CAPSULE ORAL 2 TIMES DAILY
Qty: 60 CAPSULE | Refills: 2 | Status: SHIPPED | OUTPATIENT
Start: 2024-06-14 | End: 2024-09-12

## 2024-06-14 ASSESSMENT — PAIN - FUNCTIONAL ASSESSMENT: PAIN_FUNCTIONAL_ASSESSMENT: 0-10

## 2024-06-14 NOTE — PROGRESS NOTES
HPI:  Ton is a 63-year-old male who presents today after being referred by his foot doctor for bilateral feet numbness and tingling and burning for the past 6 months.  He states the pain is mainly in the first and second toes, but can be in the whole foot.  He denies other radicular leg pain.  This is coming and going, but becoming more constant.  He has tried chiropractor treatment, inversion tables, therapy, and home therapy.  Nothing is improving.  He also states he has some leg weakness.      Additionally, the patient explains that he has bilateral upper extremity weakness and a left-sided constant trapezius pain.  This has been getting worse over the past year.  He states his balance and coordination has been slowly getting worse.    ROS:  Reviewed on EMR and patient intake sheet.    PMH/SH:  Reviewed on EMR and patient intake sheet.    Exam:  MSK: Full strength range of motion of upper and lower extremities bilaterally.  Negative straight leg raise bilaterally.  Negative Dorothea's, negative Spurling's.  General: No acute distress. Awake and conversant.  Eyes: Normal conjunctiva, anicteric. Round symmetric pupils.  ENT: Hearing grossly intact. No nasal discharge.  Neck: Neck is supple. No masses or thyromegaly.  Respiratory: Respirations are non-labored. No wheezing.  Skin: Warm. No rashes or ulcers.  Psych: Alert and oriented. Cooperative, appropriate mood and affect, normal judgement.  CV: No lower extremity edema.  Neuro: Sensation and CN II-XII grossly normal.    Radiology:     Lumbar spine x-rays personally reviewed and demonstrate moderate degenerative disc at L4/L5 and L5/S1.  Grade 1 spondylolisthesis of L3 on L4.  No acute fractures or dislocations.  Moderate facet joint arthropathy on the right.    Cervical spine x-rays personally reviewed and demonstrate grade 1 spondylolisthesis of C5 on C6 and C6 on C7.  Exacerbated by flexion extension views.  Posterior osteophytosis of vertebral bodies  throughout.  Moderate spondylosis.    Diagnosis:    Lumbar radiculopathy  Cervical radiculopathy  Lumbar spondylolisthesis  Degenerative disc disease, lumbar    Assessment and Plan:  Patient was seen today and evaluated for bilateral feet numbness and tingling, burning pain, as well as upper extremity weakness and left-sided trapezius pain.  At this time, I recommended physical therapy for cervical and lumbar spine.  We also discussed gabapentin.  He should follow-up with me in 4 to 6 weeks if his symptoms continue to worsen.  At that time, an MRI may be warranted.  I am slightly suspicious for cervical myelopathy symptoms as he has bilateral upper and lower extremity weakness.  Patient feels all questions were properly answered at time of visit today.  Patient agrees with plan above.    This note was dictated using speech recognition software and was not corrected for spelling or grammatical errors    Kelvin Zendejas PA-C  Department of Orthopaedic Surgery  9:04 AM  06/14/24    [address] [place] [address]    Voicemail: [phone]   Appts: [phone]  Fax: [phone]

## 2024-07-01 NOTE — PROGRESS NOTES
"Physical Therapy    Physical Therapy Evaluation and Treatment      Patient Name: Ton Almaraz  MRN: [id number]  Today's Date: 7/2/2024    Current Problem:   1. Chronic pain of both feet        2. Lumbar radiculopathy  Referral to Physical Therapy    Follow Up In Physical Therapy        Insurance: [facility]  Allowed visits: 20  $80 copay    Subjective  HPI: Patient with pain in both of his feet stating \"it feels like there are pins stuck in them\". This began last fall and has worsened. He has the least amount of pain in AM but it increases as he is up and moving. He has burning and cold sensations intermittently. He has seen a chiropractor for the last 6-7 months without significant relief. He has seen a podiatrist and was then referred to a back specialist. He was told that his tailbone is \"fused together\" and \"my C4 is collapsed\". He was told that he has \"the start of neuropathy\" and was prescribed Gabapentin and has not noticed significant relief as of yet. He does experience paresthesias in his feet. One foot is not more symptomatic than the other stating \"they're about the same\". Symptoms are in a stocking-glove distribution. He has not had physical therapy for this problem. Chief complaint currently is \"my feet, the tingling in my feet\". Biggest limitation is \"I can do everything but it's painful\". He is not avoiding any activities due to pain. Exacerbating factors include \"nothing really\". Relieving factors include \"laying on my back at night\" as he has less pain in AM. He does have some left sided low back pain. He denies worsening of symptoms with Valsalva maneuver. He does not notice a difference in symptoms with position changes. Symptoms have progressed from his big toe to the entirety of his foot. He denies recent weight loss. He has noticed an increased frequency in urination. He uses an inversion table that turns him into an upside down position and this provides some relief; he is in a seated position with " "this. He uses this for 4 minutes at a time. He has some relief with being barefoot. He is not taking any other medication for this problem. PMH is positive for CAD, HTN, carotid stent, and neuropathy.   Referring physician: GUANAKO Hoover-C - F/U not scheduled.   PCP: GUANAKO Tracey    Living environment: one story home with a basement; railings on stairs. 1 JER. Lives with GF.   Work:  full time. He primarily sits throughout his day.     Patient-specific goal: \"to get my feet back to where they were\".      Objective  Worst pain in the last 24 hours, 5/10    Precautions: STEADI = 6    Relevant imaging/diagnostic testing results: x-rays 6/14/24 positive for moderate lumbar degenerative changes, greatest L4-S1 similar to the previous study. Minimal anterolisthesis L3-4 from facet arthrosis with no pathologic motion.    EMG 5/21/24 Peripheral neuropathy idiopathic. Super-imposed bilateral L5-S1 radiculopathy with mild involvement of the right L4 root.     Observation: indentation at level of L1-L2; T11-T12. Otherwise no other postural deviation.     Gait Assessment: no significant gait deviation; no assistive device use    AROM  Lumbar flexion: decreased 25%   RFIS: no change with 10 reps. No change with DKTC.  Lumbar extension: decreased 50%   JUWAN: no change with 10 reps. Increased pain with LATISHA.   Lumbar sidebending right: decreased 50%    Sidebending left: decreased 50%  Lumbar rotation right: decreased 25%    Rotation left: decreased 25%  Hip flexion right: WNL    Hip flexion left: WNL  Hip extension right: slightly limited    Hip extension left: slightly limited  Hip ER supine right: WNL    Hip ER supine left: WNL  Hip IR supine right: WNL   Hip IR supine left: WNL    MMT:  (L3-L4) Right quadriceps: 5    Left quadriceps: 5  (L1-L2) Right iliopsoas: 5    Left iliopsoas: 5  (L5) Right DF: 5    Left DF: 5   (S2) Right hip abductors supine: good    Left hip abductors supine: " good  (L1-L2) Right hip adductors supine: fair    Left hip adductors supine: fair  (L5-S1) Right gluteus heber: 4    Left gluteus heber: 4  (S1-S2) Right hamstrings: 5    Left hamstrings: 5  Lower abdominals: 4/10    Upper abdominals: 10/10    Flexibility:  Right hamstring: limited    Left hamstring: limited  Right piriformis: slightly limited    Left piriformis: slightly limited  Right quadriceps: slightly limited    Left quadriceps: slightly limited    Palpation: decreased mobility at L3-L5 with most pain elicited at approximately L3. Either overactivity of right paraspinals or atrophy of left paraspinals present. TTP with UPA glides to left side of spine.     Special Tests: slump negative bilaterally  Crossed SLR negative bilaterally    CALIXTO: 12%    Assessment  64 yo male with approximately 10 month history of present condition and presence of 5 personal factors and/or comorbidities that impact the plan of care including chronicity of symptoms and PMH of CAD, HTN, carotid stent, and neuropathy presents with low back pain, bilateral foot pain, decreased AROM, slight decreased strength, decreased flexibility, and decreased tolerance to prolonged positions consistent with lumbar radiculopathy versus peripheral neuropathy effecting the following body structures and functions: low back and bilateral LE body regions and musculoskeletal and neuromuscular body systems including the lumbar spine and bilateral feet. Activity limitations and participation restrictions include decreased tolerance to prolonged positions. Ton will therefore benefit from PT management to establish a HEP and promote pain management. The clinical presentation of this patient is evolving and their history and examination findings are consistent with a moderate complexity evaluation. Good potential.    Treatment provided today: Initial evaluation completed. Discussed objective findings and goals of skilled care. Instructed patient in  "therapeutic exercise and HEP with handout outlining specific parameters provided (DKTC 20\"x3, supine HS stretch 30\"x3, seated lumbar flexion 20\"x3). Agreed upon POC and answered all questions.    96715 - 22 minutes, 1 unit untimed  09940 - 23 minutes, 2 units    Plan  1x/every other week for 4 weeks, 4 visits.     Goals  Independent with HEP to expedite progress and promote goal achievement.   Decrease CALIXTO score to < or equal to 2% for evidence of improved function.  Increase AROM lumbar spine extension and bilateral rotation by > or equal to 25% for ease of movement.   Increase strength bilateral gluteals and hip adductors to 5/5 for improved proximal stability.   Increase lower abdominal strength to > or equal to 6/10 for improved core stability.   Decreased pain at worst to < or equal to 2/10 for improved QOL.     "

## 2024-07-02 ENCOUNTER — APPOINTMENT (OUTPATIENT)
Dept: PHYSICAL THERAPY | Facility: CLINIC | Age: 64
End: 2024-07-02
Payer: COMMERCIAL

## 2024-07-02 DIAGNOSIS — M79.671 CHRONIC PAIN OF BOTH FEET: Primary | ICD-10-CM

## 2024-07-02 DIAGNOSIS — M79.672 CHRONIC PAIN OF BOTH FEET: Primary | ICD-10-CM

## 2024-07-02 DIAGNOSIS — M54.16 LUMBAR RADICULOPATHY: ICD-10-CM

## 2024-07-02 DIAGNOSIS — G89.29 CHRONIC PAIN OF BOTH FEET: Primary | ICD-10-CM

## 2024-07-02 PROCEDURE — 97162 PT EVAL MOD COMPLEX 30 MIN: CPT | Performed by: PHYSICAL THERAPIST

## 2024-07-02 PROCEDURE — 97110 THERAPEUTIC EXERCISES: CPT | Performed by: PHYSICAL THERAPIST

## 2024-07-02 ASSESSMENT — PATIENT HEALTH QUESTIONNAIRE - PHQ9
SUM OF ALL RESPONSES TO PHQ9 QUESTIONS 1 AND 2: 0
1. LITTLE INTEREST OR PLEASURE IN DOING THINGS: NOT AT ALL
2. FEELING DOWN, DEPRESSED OR HOPELESS: NOT AT ALL

## 2024-07-02 ASSESSMENT — PAIN SCALES - GENERAL: PAINLEVEL_OUTOF10: 3

## 2024-07-02 ASSESSMENT — PAIN DESCRIPTION - DESCRIPTORS: DESCRIPTORS: NUMBNESS

## 2024-07-02 ASSESSMENT — ENCOUNTER SYMPTOMS
LOSS OF SENSATION IN FEET: 1
DEPRESSION: 0
OCCASIONAL FEELINGS OF UNSTEADINESS: 1

## 2024-07-15 ENCOUNTER — APPOINTMENT (OUTPATIENT)
Dept: PHYSICAL THERAPY | Facility: CLINIC | Age: 64
End: 2024-07-15
Payer: COMMERCIAL

## 2024-07-15 DIAGNOSIS — M79.671 CHRONIC PAIN OF BOTH FEET: ICD-10-CM

## 2024-07-15 DIAGNOSIS — M79.672 CHRONIC PAIN OF BOTH FEET: ICD-10-CM

## 2024-07-15 DIAGNOSIS — G89.29 CHRONIC PAIN OF BOTH FEET: ICD-10-CM

## 2024-07-15 DIAGNOSIS — M54.16 LUMBAR RADICULOPATHY: Primary | ICD-10-CM

## 2024-07-15 PROCEDURE — 97110 THERAPEUTIC EXERCISES: CPT | Performed by: PHYSICAL THERAPIST

## 2024-07-15 PROCEDURE — 97140 MANUAL THERAPY 1/> REGIONS: CPT | Performed by: PHYSICAL THERAPIST

## 2024-07-15 ASSESSMENT — PAIN SCALES - GENERAL: PAINLEVEL_OUTOF10: 6

## 2024-07-15 NOTE — PROGRESS NOTES
"Physical Therapy    Physical Therapy Treatment    Patient Name: Ton Almaraz  MRN: [id number]  Today's Date: 7/15/2024    Current Problem  1. Lumbar radiculopathy  Follow Up In Physical Therapy      2. Chronic pain of both feet          General  Reason for Referral: (P) Lumbar radiculopathy  Referred By: (P) Kelvin Zendejas PA-C    Insurance: [employer]  Allowed visits: 20  $80 copay    Subjective  Patient with continued pain in his low back. He did try to perform his HEP twice per day but notes an increase in low back pain and hip stating \"it got to the point where I couldn't walk\". He is not sure if symptoms in his feet improved however. He has days where the bottom of his feet \"feel like they're on fire\".    Objective  Reviewed and progressed marked therapeutic exercise per patient tolerance (94567 - 32 minutes, 2 units) Alec 5'  *LTR x20  Supine HS stretch 30\"x3  DKTC 20\"x3  *Ball squeeze 10\"x10  *TAC 5\"x10  *SKTC isometrics 5\"x10 ea  Seated lumbar flexion 20\"x3    *added to HEP    MT (54453 - 8 minutes, 1 unit) PA glides to L3-L5 grade III; UPA glides to right side of L3-L5 per patient tolerance to promote left sided mobility and pain modulation.     Assessment  Patient with some decreased pain following session but minimally. Did tolerated workout decently with some increased pain with TAC. Encouraged continued compliance with HEP once per day.     Plan  Continue per POC at this time.      "

## 2024-07-18 DIAGNOSIS — F41.1 GENERALIZED ANXIETY DISORDER: ICD-10-CM

## 2024-07-18 RX ORDER — HYDROXYZINE PAMOATE 25 MG/1
CAPSULE ORAL
Qty: 60 CAPSULE | Refills: 1 | OUTPATIENT
Start: 2024-07-18

## 2024-07-22 ENCOUNTER — APPOINTMENT (OUTPATIENT)
Dept: PHYSICAL THERAPY | Facility: CLINIC | Age: 64
End: 2024-07-22
Payer: COMMERCIAL

## 2024-07-29 ENCOUNTER — APPOINTMENT (OUTPATIENT)
Dept: PHYSICAL THERAPY | Facility: CLINIC | Age: 64
End: 2024-07-29
Payer: COMMERCIAL

## 2024-07-29 ENCOUNTER — DOCUMENTATION (OUTPATIENT)
Dept: PHYSICAL THERAPY | Facility: CLINIC | Age: 64
End: 2024-07-29

## 2024-07-29 NOTE — PROGRESS NOTES
Physical Therapy                 Therapy Communication Note    Patient Name: Ton BRIA Almaraz  MRN: [id number]  Today's Date: 7/29/2024     Discipline: Physical Therapy    Missed Visit Reason: Patient cancelled scheduled appointment for this AM.     Missed Time: Cancel

## 2024-08-05 ENCOUNTER — DOCUMENTATION (OUTPATIENT)
Dept: PHYSICAL THERAPY | Facility: CLINIC | Age: 64
End: 2024-08-05

## 2024-08-05 ENCOUNTER — APPOINTMENT (OUTPATIENT)
Dept: PHYSICAL THERAPY | Facility: CLINIC | Age: 64
End: 2024-08-05
Payer: COMMERCIAL

## 2024-08-05 NOTE — PROGRESS NOTES
Physical Therapy                 Therapy Communication Note    Patient Name: Ton Almaraz  MRN: [id number]  Today's Date: 8/5/2024     Discipline: Physical Therapy    Missed Visit Reason: Patient did not show for scheduled re-evaluation appointment for this AM.     Missed Time: No Show    Comment: No further PT visits scheduled at this time.

## 2024-08-12 ENCOUNTER — APPOINTMENT (OUTPATIENT)
Dept: PHYSICAL THERAPY | Facility: CLINIC | Age: 64
End: 2024-08-12
Payer: COMMERCIAL

## 2024-08-12 DIAGNOSIS — M79.671 CHRONIC PAIN OF BOTH FEET: ICD-10-CM

## 2024-08-12 DIAGNOSIS — M54.16 LUMBAR RADICULOPATHY: Primary | ICD-10-CM

## 2024-08-12 DIAGNOSIS — M79.672 CHRONIC PAIN OF BOTH FEET: ICD-10-CM

## 2024-08-12 DIAGNOSIS — G89.29 CHRONIC PAIN OF BOTH FEET: ICD-10-CM

## 2024-08-12 PROCEDURE — 97110 THERAPEUTIC EXERCISES: CPT | Performed by: PHYSICAL THERAPIST

## 2024-08-12 PROCEDURE — 97140 MANUAL THERAPY 1/> REGIONS: CPT | Performed by: PHYSICAL THERAPIST

## 2024-08-12 ASSESSMENT — PAIN SCALES - GENERAL: PAINLEVEL_OUTOF10: 4

## 2024-08-12 NOTE — PROGRESS NOTES
"Physical Therapy    Physical Therapy Treatment    Patient Name: Ton Almaraz  MRN: [id number]  Today's Date: 8/12/2024    Current Problem  1. Lumbar radiculopathy  Follow Up In Physical Therapy      2. Chronic pain of both feet          General  Reason for Referral: Lumbar radiculopathy  Referred By: Kelvin Zendejas PA-C    Subjective  Patient with no real change in symptoms. He has not been seen clinically in nearly 1 month. He did have one day of significant relief but this did not last and does feel as though pain in his toes is now more widespread. He has been compliant with his HEP. He does not have a F/U scheduled with referring physician. He is taking Gabapentin without relief. He has more pain in left side of his back but more pain in his right foot compared to his left.     Objective  Reviewed and progressed marked therapeutic exercise per patient tolerance (77652 - 22 minutes, 1 unit) Alec 5'  LTR x20  Supine HS stretch 30\"x3  DKTC stretch 20\"x3  Ball squeeze 10\"x10  TAC 5\"x10  SKTC isometrics 5\"x10 ea  Seated lumbar flexion 20\"x3    MT (93285 - 23 minutes, 2 units) Dry needling performed this date using 50 mm/2\" needles to L2 and L5 posterior cutaneous homeostatic points (4 needles used in total). Informed consent obtained today and is on file in patient's chart. [place] precautions and clean technique utilized throughout. Skin was inspected for any adverse reactions and none were present. Patient was educated to avoid NSAIDs and use of ice for the next 24 hours to allow for maximum healing potential.   PA glides to L3-L5 grade III; AGNES glides to right side of L3-L5 per patient tolerance to promote left sided mobility and pain modulation.     Assessment  Good response to dry needling intervention with deep ache elicited at left aspect of L5 segment. Continued decreased mobility throughout lumbar spine. He expressed some relief of back symptoms but no change in foot pain following session. A lot of his LE " symptoms may be due to peripheral neuropathy which was evident on EMG in May and is unlikely to respond to exercise interventions. He may benefit from additional dry needling depending on symptoms over the next 24-48 hours. Will assess this next visit. If no change will likely refer patient back to [facility] for further imaging.     Plan  Continue per POC at this time.

## 2024-08-27 ENCOUNTER — APPOINTMENT (OUTPATIENT)
Dept: PHYSICAL THERAPY | Facility: CLINIC | Age: 64
End: 2024-08-27
Payer: COMMERCIAL

## 2024-08-27 DIAGNOSIS — M79.671 CHRONIC PAIN OF BOTH FEET: ICD-10-CM

## 2024-08-27 DIAGNOSIS — G89.29 CHRONIC PAIN OF BOTH FEET: ICD-10-CM

## 2024-08-27 DIAGNOSIS — M54.16 LUMBAR RADICULOPATHY: Primary | ICD-10-CM

## 2024-08-27 DIAGNOSIS — M79.672 CHRONIC PAIN OF BOTH FEET: ICD-10-CM

## 2024-08-27 PROCEDURE — 97140 MANUAL THERAPY 1/> REGIONS: CPT | Performed by: PHYSICAL THERAPIST

## 2024-08-27 PROCEDURE — 97110 THERAPEUTIC EXERCISES: CPT | Performed by: PHYSICAL THERAPIST

## 2024-08-27 ASSESSMENT — PAIN SCALES - GENERAL: PAINLEVEL_OUTOF10: 6

## 2024-08-27 NOTE — PROGRESS NOTES
"Physical Therapy    Physical Therapy Treatment    Patient Name: Ton Almaraz  MRN: [id number]  Today's Date: 8/27/2024    Current Problem  1. Lumbar radiculopathy  Follow Up In Physical Therapy      2. Chronic pain of both feet          General  Reason for Referral: Lumbar radiculopathy  Referred By: Kelvin Zendejas PA-C    Subjective  Patient with continued pain in low back and left LE. He feels as though he rolled his left ankle but he did not. He does not think that dry needling helped. He has been compliant with ihs HEP and does not get much relief from this either. He does not have a F/U scheduled with referring physician.     Objective  Reviewed and progressed marked therapeutic exercise per patient tolerance (52029 - 32 minutes, 2 units) *Camel x10  LTR x20  Supine HS stretch 30\"x3  DKTC stretch 20\"x3  Ball squeeze 10\"x10  *Supine clamshells RTB 2x10  SKTC isometrics 5\"x10 ea  Seated lumbar flexion 20\"x3    *added to HEP    MT (20703 - 8 minutes, 1 unit) PA glides to L3-L5 grade III; UPA glides to right side of L3-L5 per patient tolerance to promote left sided mobility and pain modulation.     Assessment  Patient with no significant change with skilled care. He has actually had some worsening of numbness in his feet. He will benefit from referral back to [facility] at this time. Recommending hold on continued PT. No further visits planned at this time.    Plan  Continue per POC at this time.    "

## 2024-09-24 ENCOUNTER — OFFICE VISIT (OUTPATIENT)
Dept: ORTHOPEDIC SURGERY | Facility: CLINIC | Age: 64
End: 2024-09-24
Payer: COMMERCIAL

## 2024-09-24 VITALS — HEIGHT: 74 IN | BODY MASS INDEX: 30.8 KG/M2 | WEIGHT: 240 LBS

## 2024-09-24 DIAGNOSIS — M54.16 LUMBAR RADICULOPATHY: Primary | ICD-10-CM

## 2024-09-24 DIAGNOSIS — G62.9 NEUROPATHY: ICD-10-CM

## 2024-09-24 PROCEDURE — 99214 OFFICE O/P EST MOD 30 MIN: CPT | Performed by: ORTHOPAEDIC SURGERY

## 2024-09-24 PROCEDURE — 3008F BODY MASS INDEX DOCD: CPT | Performed by: ORTHOPAEDIC SURGERY

## 2024-09-24 PROCEDURE — 99204 OFFICE O/P NEW MOD 45 MIN: CPT | Performed by: ORTHOPAEDIC SURGERY

## 2024-09-24 ASSESSMENT — PAIN - FUNCTIONAL ASSESSMENT: PAIN_FUNCTIONAL_ASSESSMENT: 0-10

## 2024-09-24 ASSESSMENT — PAIN SCALES - GENERAL: PAINLEVEL_OUTOF10: 7

## 2024-09-24 NOTE — PROGRESS NOTES
Ton Almaraz is a 63 y.o. male who presents for New Patient Visit of the Lower Back (Into both legs to his toes/Bothered him for over 1 year, getting worse/Has tried chiropractic and physical therapy/X-rays at ).    HPI:  63-year-old gentleman here for new patient visit of low back pain and bilateral leg pain.  He denies any fever chills nausea vomiting night sweats.  He has no bowel or bladder complaints.    Physical exam:  Well-nourished, well kept.No lymphangitis or lymphadenopathy in the examined extremities.  Gait normal.  Can stand on heels and toes.   Examination of the back shows some tenderness in the paraspinous musculature in the lower left lumbosacral area.  There is no significant decreased range of motion in all directions due to guarding/muscle spasms and pain at extremes.  There is good strength and no instability.  Examination of the lower extremities reveals no point tenderness, swelling, or deformity.  Range of motion of the hips, knees, and ankles are full without crepitance, instability, or exacerbation of pain.  Strength is 5/5 throughout.  No redness, abrasions, or lesions on extremities  Gross sensation intact in the extremities.  Deep tendon reflexes 1+ bilateral. Clonus negative.  Affect normal.  Alert and oriented ×3.  Coordination normal.  Pedal pulses are very weak bilaterally.  Feet are warm to the touch.  Capillary refill is good.    Imaging studies:  AP lateral flexion-extension plain films were reviewed today from June 14, 2024.    Assessment:  63-year-old gentleman here for new patient visit for bilateral feet numbness tingling and paresthesias.  He does have some left low back pain but his feet are what bother him the most.  This has been going on for about a year.  It goes up to about his ankle bilaterally.  His feet feel cold to him, they are numb and tingling throughout the top and bottom surface.  His toes are all numb and tingling.  He had an EMG done in May 2024 that  showed an L5-S1 bilateral radiculopathy with denervation.  It was read also as idiopathic peripheral neuropathy.  He does have some low back pain as well, but he says he can live with that.  His primary concern are his feet.  He has done physical therapy and that did not help.  No history of back surgery.  His pedal pulses are very weak, but he does have warm feet and good capillary refill, it does not appear this is a circulation issue.    We have reviewed tests.  We reviewed notes from his primary care physician assistant Kelvin Aashish, from June 14, 2024 this does discuss his foot numbness tingling and burning.  This is an exacerbation of a chronic problem, it is affecting his bodily function.    For complete plan and/or surgical details, please refer to Dr. Fleming's portion of this split dictation.    -Jeremiah Salas PA-C    In a face-to-face encounter, I performed a history and physical examination, discussed pertinent diagnostic studies if indicated, and discussed diagnosis and management strategies with both the patient and the midlevel provider.  I reviewed the midlevel's note and agree with the documented findings and plan of care.    Patient with numbness and tingling in both of his feet.  He has an EMG nerve conduction study that shows neuropathy.  He has a little bit of back pain.  I do not get any hyperreflexia.  His gait is not altered.  He does not notice any significant weakness or clumsiness.  He has not had any upper extremity symptoms.  This is mainly just numbness and tingling in his feet.  X-rays show diffuse degenerative changes which are age-appropriate.  I think this is probably neuropathy and not coming from his spine.  I told the patient I be happy to order an MRI of his cervical thoracic lumbar spine to make sure there is nothing else going on but at this point I do recommend neurology.  We are going to get him a referral for that.  We have also given him information on an  anti-inflammatory diet today.  He does not do well with that he will come back and see us.  If his symptoms change or worsen he will come back and see us.  He has absolutely no symptoms in his thighs or calves hips or buttocks.  We have reviewed x-rays today of his cervical spine and reviewed x-rays of his lumbar spine.  We reviewed the notes of GUANAKO Hoover who has been working him up for his spine.  Currently, there is no evidence of myelopathy on his physical exam or history.    Jean Fleming MD  Orthopedic surgery

## 2024-10-10 ENCOUNTER — OFFICE VISIT (OUTPATIENT)
Dept: FAMILY MEDICINE CLINIC | Age: 64
End: 2024-10-10
Payer: COMMERCIAL

## 2024-10-10 VITALS
RESPIRATION RATE: 16 BRPM | BODY MASS INDEX: 31.44 KG/M2 | DIASTOLIC BLOOD PRESSURE: 76 MMHG | HEIGHT: 74 IN | SYSTOLIC BLOOD PRESSURE: 132 MMHG | HEART RATE: 68 BPM | WEIGHT: 245 LBS | TEMPERATURE: 98.4 F | OXYGEN SATURATION: 99 %

## 2024-10-10 DIAGNOSIS — R06.2 WHEEZING: ICD-10-CM

## 2024-10-10 DIAGNOSIS — J01.40 ACUTE NON-RECURRENT PANSINUSITIS: Primary | ICD-10-CM

## 2024-10-10 DIAGNOSIS — H65.03 NON-RECURRENT ACUTE SEROUS OTITIS MEDIA OF BOTH EARS: ICD-10-CM

## 2024-10-10 PROBLEM — I25.10 CAD S/P PERCUTANEOUS CORONARY ANGIOPLASTY: Status: ACTIVE | Noted: 2023-09-12

## 2024-10-10 PROBLEM — Z95.818 PRESENCE OF WATCHMAN LEFT ATRIAL APPENDAGE CLOSURE DEVICE: Status: ACTIVE | Noted: 2023-09-12

## 2024-10-10 PROBLEM — Z79.899 ON DOFETILIDE THERAPY: Status: ACTIVE | Noted: 2023-09-12

## 2024-10-10 PROBLEM — Z78.9 NEVER SMOKED ANY SUBSTANCE: Status: ACTIVE | Noted: 2023-09-12

## 2024-10-10 PROBLEM — M06.9 RHEUMATOID ARTHRITIS (HCC): Status: ACTIVE | Noted: 2023-09-12

## 2024-10-10 PROBLEM — Z82.49 FAMILY HISTORY OF ISCHEMIC HEART DISEASE: Status: ACTIVE | Noted: 2024-01-31

## 2024-10-10 PROBLEM — Z79.899 HIGH RISK MEDICATION USE: Status: ACTIVE | Noted: 2023-09-12

## 2024-10-10 PROBLEM — Z98.61 CAD S/P PERCUTANEOUS CORONARY ANGIOPLASTY: Status: ACTIVE | Noted: 2023-09-12

## 2024-10-10 PROBLEM — E66.9 OBESITY DUE TO ENERGY IMBALANCE: Status: ACTIVE | Noted: 2023-09-12

## 2024-10-10 PROBLEM — R60.0 LOWER EXTREMITY EDEMA: Status: ACTIVE | Noted: 2023-09-12

## 2024-10-10 PROBLEM — Z71.89 ENCOUNTER FOR MEDICATION REVIEW AND COUNSELING: Status: ACTIVE | Noted: 2024-04-16

## 2024-10-10 PROCEDURE — 99213 OFFICE O/P EST LOW 20 MIN: CPT | Performed by: NURSE PRACTITIONER

## 2024-10-10 PROCEDURE — 3075F SYST BP GE 130 - 139MM HG: CPT | Performed by: NURSE PRACTITIONER

## 2024-10-10 PROCEDURE — 3078F DIAST BP <80 MM HG: CPT | Performed by: NURSE PRACTITIONER

## 2024-10-10 RX ORDER — ALBUTEROL SULFATE 90 UG/1
2 INHALANT RESPIRATORY (INHALATION) EVERY 6 HOURS PRN
Qty: 18 G | Refills: 0 | Status: SHIPPED | OUTPATIENT
Start: 2024-10-10 | End: 2024-10-24

## 2024-10-10 RX ORDER — FLUTICASONE PROPIONATE 50 MCG
SPRAY, SUSPENSION (ML) NASAL
Qty: 16 EACH | Refills: 1 | Status: SHIPPED | OUTPATIENT
Start: 2024-10-10

## 2024-10-10 RX ORDER — CETIRIZINE HYDROCHLORIDE 10 MG/1
10 TABLET ORAL DAILY
Qty: 30 TABLET | Refills: 0 | Status: SHIPPED | OUTPATIENT
Start: 2024-10-10 | End: 2024-11-09

## 2024-10-10 RX ORDER — METHYLPREDNISOLONE 4 MG
TABLET, DOSE PACK ORAL
Qty: 21 KIT | Refills: 0 | Status: SHIPPED | OUTPATIENT
Start: 2024-10-10

## 2024-10-10 RX ORDER — ATENOLOL 25 MG/1
25 TABLET ORAL DAILY
COMMUNITY
Start: 2024-01-31 | End: 2025-01-30

## 2024-10-10 SDOH — ECONOMIC STABILITY: INCOME INSECURITY: HOW HARD IS IT FOR YOU TO PAY FOR THE VERY BASICS LIKE FOOD, HOUSING, MEDICAL CARE, AND HEATING?: PATIENT DECLINED

## 2024-10-10 ASSESSMENT — ENCOUNTER SYMPTOMS
HOARSE VOICE: 1
SHORTNESS OF BREATH: 0
SINUS PRESSURE: 1
COUGH: 1
WHEEZING: 0
EYE REDNESS: 0
EYE DISCHARGE: 0
RHINORRHEA: 1
EYE ITCHING: 0
SORE THROAT: 1

## 2024-10-10 ASSESSMENT — PATIENT HEALTH QUESTIONNAIRE - PHQ9: DEPRESSION UNABLE TO ASSESS: URGENT/EMERGENT SITUATION

## 2024-10-10 NOTE — PROGRESS NOTES
Marty Michelle (:  1960) is a 64 y.o. male, Established patient, here for evaluation of the following chief complaint(s):  OTHER (Nasal congestion,chest discomfort, fatigue  cough and headache. Worse at night. Started 2 weeks ago. )      Vitals:    10/10/24 1216   BP: 132/76   Pulse: 68   Resp: 16   Temp: 98.4 °F (36.9 °C)   SpO2: 99%       ASSESSMENT/PLAN:  1. Acute non-recurrent pansinusitis  -     amoxicillin-clavulanate (AUGMENTIN) 875-125 MG per tablet; Take 1 tablet by mouth 2 times daily for 10 days, Disp-20 tablet, R-0Normal  -     fluticasone (FLONASE) 50 MCG/ACT nasal spray; Take 1 spray each nostril at night, Disp-16 each, R-1Normal  -     cetirizine (ZYRTEC) 10 MG tablet; Take 1 tablet by mouth daily, Disp-30 tablet, R-0Normal  2. Non-recurrent acute serous otitis media of both ears  -     fluticasone (FLONASE) 50 MCG/ACT nasal spray; Take 1 spray each nostril at night, Disp-16 each, R-1Normal  -     cetirizine (ZYRTEC) 10 MG tablet; Take 1 tablet by mouth daily, Disp-30 tablet, R-0Normal  3. Wheezing  -     methylPREDNISolone (MEDROL, DENIS,) 4 MG tablet; Take by mouth., Disp-21 kit, R-0Normal  -     albuterol sulfate HFA (PROVENTIL;VENTOLIN;PROAIR) 108 (90 Base) MCG/ACT inhaler; Inhale 2 puffs into the lungs every 6 hours as needed for Wheezing, Disp-18 g, R-0Normal        Return if symptoms worsen or fail to improve.      SUBJECTIVE/OBJECTIVE:    Sinusitis  This is a new problem. Episode onset: x2 weeks nasal congestion, chest tightness/wheezing, fatigue, cough, headache.  cough worse at night, sore throat worse in am. The problem has been gradually worsening since onset. There has been no fever. His pain is at a severity of 4/10. The pain is moderate. Associated symptoms include congestion, coughing, ear pain (bilateral), headaches, a hoarse voice, sinus pressure and a sore throat. Pertinent negatives include no chills, neck pain or shortness of breath. Past treatments include oral

## 2024-10-16 ENCOUNTER — APPOINTMENT (OUTPATIENT)
Dept: CARDIOLOGY | Facility: CLINIC | Age: 64
End: 2024-10-16
Payer: COMMERCIAL

## 2024-10-16 VITALS — WEIGHT: 243 LBS | BODY MASS INDEX: 31.18 KG/M2 | HEIGHT: 74 IN

## 2024-10-16 DIAGNOSIS — I48.91 ATRIAL FIBRILLATION WITH RVR (MULTI): ICD-10-CM

## 2024-10-16 DIAGNOSIS — I10 ESSENTIAL HYPERTENSION: ICD-10-CM

## 2024-10-16 DIAGNOSIS — I48.19 PERSISTENT ATRIAL FIBRILLATION (MULTI): Primary | ICD-10-CM

## 2024-10-16 PROCEDURE — 3008F BODY MASS INDEX DOCD: CPT | Performed by: NURSE PRACTITIONER

## 2024-10-16 PROCEDURE — 93000 ELECTROCARDIOGRAM COMPLETE: CPT | Performed by: INTERNAL MEDICINE

## 2024-10-16 PROCEDURE — 99214 OFFICE O/P EST MOD 30 MIN: CPT | Performed by: NURSE PRACTITIONER

## 2024-10-16 RX ORDER — AMOXICILLIN AND CLAVULANATE POTASSIUM 875; 125 MG/1; MG/1
1 TABLET, FILM COATED ORAL 2 TIMES DAILY
COMMUNITY
Start: 2024-10-10 | End: 2024-10-20

## 2024-10-16 RX ORDER — CETIRIZINE HYDROCHLORIDE 10 MG/1
1 TABLET ORAL
COMMUNITY
Start: 2024-10-10

## 2024-10-16 RX ORDER — DIGOXIN 125 MCG
125 TABLET ORAL DAILY
Qty: 30 TABLET | Refills: 11 | Status: SHIPPED | OUTPATIENT
Start: 2024-10-16 | End: 2025-10-16

## 2024-10-16 RX ORDER — ALBUTEROL SULFATE 90 UG/1
2 INHALANT RESPIRATORY (INHALATION) EVERY 6 HOURS PRN
COMMUNITY
Start: 2024-10-10

## 2024-10-16 NOTE — PROGRESS NOTES
"CARDIOLOGY OFFICE VISIT      CHIEF COMPLAINT  Chief Complaint   Patient presents with    Follow-up     Pt is here today following up after 6 months    Chief complaint: \"I have had a couple episodes of palpitations and a feeling more tired lately.\"    HISTORY OF PRESENT ILLNESS  HPI  History: The patient is a 63-year-old  male who is followed for persistent atrial fibrillation on dofetilide and magnesium oxide and placement of a number 24 mm watchman left atrial appendage device on generous 6, 2023 for prophylaxis from thromboembolic event.  Patient has a history of intolerance to beta-blocker due to chronic cough and calcium channel blocker due to increasing lower extremity edema.  He states that recently he has had 2 episodes of tachyarrhythmic palpitations and is experiencing increased fatigue.  He denies chest pain, dizziness, lightheadedness, shortness of breath, abdominal distention, or lower extremity edema.  Past Medical History  Past Medical History:   Diagnosis Date    Acute serous otitis media, left ear 05/19/2017    Acute serous otitis media, left ear    Body mass index (BMI) 33.0-33.9, adult 03/15/2022    BMI 33.0-33.9,adult    Encounter for preprocedural cardiovascular examination 02/22/2022    Preop cardiovascular exam    Other chest pain 11/04/2021    Chest pain, non-cardiac    Other specified counseling 03/15/2022    Encounter for medication counseling    Other specified symptoms and signs involving the circulatory and respiratory systems 11/04/2021    Bruit of right carotid artery    Other specified symptoms and signs involving the circulatory and respiratory systems 11/04/2021    Decreased pedal pulses    Pain in leg, unspecified 11/04/2021    Leg pain    Person consulting for explanation of examination or test findings 03/15/2022    Encounter to discuss test results    Persons encountering health services in other specified circumstances 02/22/2022    Encounter to establish care with new " doctor       Social History  Social History     Tobacco Use    Smoking status: Never    Smokeless tobacco: Never   Substance Use Topics    Alcohol use: Not Currently     Comment: very rarely    Drug use: Not Currently       Family History     Family History   Problem Relation Name Age of Onset    Scleroderma Mother      Other (scleroderma involving lung) Mother      Prostate cancer Father      Coronary artery disease Other Family hx         Allergies:  Allergies   Allergen Reactions    Diltiazem Unknown    Metoprolol Succinate Unknown    Oxycodone Unknown        Outpatient Medications:  Current Outpatient Medications   Medication Instructions    albuterol 90 mcg/actuation inhaler 2 puffs, Every 6 hours PRN    amoxicillin-pot clavulanate (Augmentin) 875-125 mg tablet 1 tablet, 2 times daily    aspirin 81 mg EC tablet 1 tablet, Daily    atenolol (TENORMIN) 25 mg, oral, Daily    b complex 0.4 mg tablet 1 tablet, Daily    cetirizine (ZyrTEC) 10 mg tablet 1 tablet, Daily (0630)    dofetilide (Tikosyn) 250 mcg capsule Take 2 capsules in the am and one in the pm    fluticasone (Flonase) 50 mcg/actuation nasal spray 2 sprays, Daily    gabapentin (NEURONTIN) 300 mg, oral, 2 times daily    losartan (COZAAR) 100 mg, oral, Daily    magnesium oxide (MAG-OX) 400 mg, oral, 2 times daily    nitroglycerin (NITROSTAT) 0.4 mg, sublingual, As needed          REVIEW OF SYSTEMS  Review of Systems   All other systems reviewed and are negative.        VITALS  There were no vitals filed for this visit.    PHYSICAL EXAM  Vitals and nursing note reviewed.   Constitutional:       Appearance: Normal appearance.   HENT:      Head: Normocephalic.   Neck:      Vascular: No JVD. Carotid upstrokes II/IV.  Cardiovascular:      Rate and Rhythm: Normal rate and regular rhythm.      Pulses: Normal pulses.      Heart sounds: Normal S1 S2, no S3 S4.  No murmurs or rubs.  Pulmonary:      Effort: Pulmonary effort is normal. Respirations regular and  nonlabored.     Breath sounds: Clear to auscultation posterior laterally.  Abdominal:      General: Bowel sounds are normal.      Palpations: Abdomen is soft.   Musculoskeletal:         General: Normal range of motion.      Cervical back: Normal range of motion.   Skin:     General: Skin is warm and dry.   Neurological:      General: No focal deficit present.      Mental Status: Alert and oriented to person, place, and time.      Motor: Motor function is intact.   Psychiatric:         Attention and Perception: Attention and perception normal.         Mood and Affect: Mood and affect normal.         Speech: Speech normal.         Behavior: Behavior normal. Behavior is cooperative.         Thought Content: Thought content normal.         Cognition and Memory: Cognition and memory normal.     Labs and testing: Twelve-lead EKG reveals sinus rhythm with 1 PVC.  QRS duration 108 ms,  ms, QTc 448 ms.  No acute ischemic changes or infarct patterns are noted.      ASSESSMENT AND PLAN    Clinical impressions:  1. Persistent atrial fibrillation on dofetilide, magnesium oxide and not anticoagulated status post # 24 mm watchman left atrial appendage occlusion device on January 6, 2023 for prophylaxis from thromboembolic event.  2. Coronary artery disease status post PTCA with drug-eluting stent to the mid LAD on February 1, 2021 with 40% proximal LAD, 10% proximal and mid circumflex, 10 to 30% mid RCA stenosis managed medically.  3. Lower extremity edema.  4. Polymyalgia rheumatica.  5. Remote TIA with no ongoing neurological deficits.  6. Dyslipidemia on statin.  7. Carotid artery stenosis with duplex scan dated November 10, 2021 revealing less than 50% bilateral internal carotid artery stenosis.  8. Class I obesity with a BMI 31.20.    Recommendations:  1.  Patient will begin digoxin 0.125 mg daily for control of palpitations.  He will continue all other medications as prescribed.  2.  Discussed anatomy and physiology of  the documented arrhythmia.  Treatment options reviewed in detail. Instructed to take all current medications as prescribed.  Limit caffeine and alcohol consumption to two beverages per day.  Increase water intake to 64 ounces per day.  Refrain from over the counter cold medications in tablet form.  Treat symptoms:  nasal spray for nasal congestion; zinc lozenges for sore throat; plain Robitussin or Delsym for cough; increase vitamin C intake.  Exercise five or more days per week for 30 minutes per session per American Heart Association guidelines.  Continue lifestyle modifications as discussed.  3.  Follow-up in office with Dr. Sanz in 6 months or sooner if needed.    Evaluation and note by Nia Estes CNP  **Please excuse any errors in grammar or translation related to this dictation.  Voice recognition software was utilized to prepare this document.**

## 2024-11-14 DIAGNOSIS — J01.40 ACUTE NON-RECURRENT PANSINUSITIS: ICD-10-CM

## 2024-11-14 DIAGNOSIS — H65.03 NON-RECURRENT ACUTE SEROUS OTITIS MEDIA OF BOTH EARS: ICD-10-CM

## 2024-12-13 RX ORDER — HYDROXYZINE PAMOATE 25 MG/1
25 CAPSULE ORAL 3 TIMES DAILY PRN
Qty: 90 CAPSULE | Refills: 0 | Status: SHIPPED | OUTPATIENT
Start: 2024-12-13 | End: 2025-01-12

## 2024-12-17 ENCOUNTER — OFFICE VISIT (OUTPATIENT)
Dept: INTERNAL MEDICINE | Age: 64
End: 2024-12-17
Payer: COMMERCIAL

## 2024-12-17 VITALS
WEIGHT: 250.8 LBS | DIASTOLIC BLOOD PRESSURE: 100 MMHG | HEART RATE: 74 BPM | HEIGHT: 74 IN | SYSTOLIC BLOOD PRESSURE: 162 MMHG | BODY MASS INDEX: 32.19 KG/M2 | OXYGEN SATURATION: 96 % | RESPIRATION RATE: 16 BRPM

## 2024-12-17 DIAGNOSIS — Z13.1 SCREENING FOR DIABETES MELLITUS: ICD-10-CM

## 2024-12-17 DIAGNOSIS — F41.9 ANXIETY: ICD-10-CM

## 2024-12-17 DIAGNOSIS — G62.9 PERIPHERAL POLYNEUROPATHY: ICD-10-CM

## 2024-12-17 DIAGNOSIS — I10 ESSENTIAL HYPERTENSION: ICD-10-CM

## 2024-12-17 DIAGNOSIS — G62.9 PERIPHERAL POLYNEUROPATHY: Primary | ICD-10-CM

## 2024-12-17 DIAGNOSIS — D69.6 THROMBOCYTOPENIA (HCC): Primary | ICD-10-CM

## 2024-12-17 PROBLEM — F41.1 GENERALIZED ANXIETY DISORDER: Status: ACTIVE | Noted: 2024-12-17

## 2024-12-17 LAB
ALBUMIN SERPL-MCNC: 4.4 G/DL (ref 3.5–4.6)
ALP SERPL-CCNC: 85 U/L (ref 35–104)
ALT SERPL-CCNC: 21 U/L (ref 0–41)
ANION GAP SERPL CALCULATED.3IONS-SCNC: 9 MEQ/L (ref 9–15)
AST SERPL-CCNC: 30 U/L (ref 0–40)
BASOPHILS # BLD: 0 K/UL (ref 0–0.2)
BASOPHILS NFR BLD: 0.5 %
BILIRUB SERPL-MCNC: 0.8 MG/DL (ref 0.2–0.7)
BUN SERPL-MCNC: 18 MG/DL (ref 8–23)
CALCIUM SERPL-MCNC: 9.4 MG/DL (ref 8.5–9.9)
CHLORIDE SERPL-SCNC: 104 MEQ/L (ref 95–107)
CO2 SERPL-SCNC: 27 MEQ/L (ref 20–31)
CREAT SERPL-MCNC: 0.92 MG/DL (ref 0.7–1.2)
EOSINOPHIL # BLD: 0 K/UL (ref 0–0.7)
EOSINOPHIL NFR BLD: 0.5 %
ERYTHROCYTE [DISTWIDTH] IN BLOOD BY AUTOMATED COUNT: 13.9 % (ref 11.5–14.5)
FOLATE: >40 NG/ML (ref 4.8–24.2)
GLOBULIN SER CALC-MCNC: 2.6 G/DL (ref 2.3–3.5)
GLUCOSE SERPL-MCNC: 95 MG/DL (ref 70–99)
HBA1C MFR BLD: 5 %
HCT VFR BLD AUTO: 44.2 % (ref 42–52)
HGB BLD-MCNC: 14.8 G/DL (ref 14–18)
LYMPHOCYTES # BLD: 1.5 K/UL (ref 1–4.8)
LYMPHOCYTES NFR BLD: 40.2 %
MCH RBC QN AUTO: 29.3 PG (ref 27–31.3)
MCHC RBC AUTO-ENTMCNC: 33.5 % (ref 33–37)
MCV RBC AUTO: 87.5 FL (ref 79–92.2)
MONOCYTES # BLD: 0.6 K/UL (ref 0.2–0.8)
MONOCYTES NFR BLD: 16.1 %
NEUTROPHILS # BLD: 1.6 K/UL (ref 1.4–6.5)
NEUTS SEG NFR BLD: 42.2 %
PLATELET # BLD AUTO: 50 K/UL (ref 130–400)
PLATELET BLD QL SMEAR: ABNORMAL
POTASSIUM SERPL-SCNC: 4.1 MEQ/L (ref 3.4–4.9)
PROT SERPL-MCNC: 7 G/DL (ref 6.3–8)
RBC # BLD AUTO: 5.05 M/UL (ref 4.7–6.1)
SLIDE REVIEW: ABNORMAL
SODIUM SERPL-SCNC: 140 MEQ/L (ref 135–144)
VITAMIN B-12: 1619 PG/ML (ref 232–1245)
VITAMIN D 25-HYDROXY: 34.6 NG/ML (ref 30–100)
WBC # BLD AUTO: 3.8 K/UL (ref 4.8–10.8)

## 2024-12-17 PROCEDURE — 83036 HEMOGLOBIN GLYCOSYLATED A1C: CPT | Performed by: STUDENT IN AN ORGANIZED HEALTH CARE EDUCATION/TRAINING PROGRAM

## 2024-12-17 PROCEDURE — 3080F DIAST BP >= 90 MM HG: CPT | Performed by: STUDENT IN AN ORGANIZED HEALTH CARE EDUCATION/TRAINING PROGRAM

## 2024-12-17 PROCEDURE — 90471 IMMUNIZATION ADMIN: CPT | Performed by: STUDENT IN AN ORGANIZED HEALTH CARE EDUCATION/TRAINING PROGRAM

## 2024-12-17 PROCEDURE — 99214 OFFICE O/P EST MOD 30 MIN: CPT | Performed by: STUDENT IN AN ORGANIZED HEALTH CARE EDUCATION/TRAINING PROGRAM

## 2024-12-17 PROCEDURE — 3077F SYST BP >= 140 MM HG: CPT | Performed by: STUDENT IN AN ORGANIZED HEALTH CARE EDUCATION/TRAINING PROGRAM

## 2024-12-17 PROCEDURE — 90661 CCIIV3 VAC ABX FR 0.5 ML IM: CPT | Performed by: STUDENT IN AN ORGANIZED HEALTH CARE EDUCATION/TRAINING PROGRAM

## 2024-12-17 RX ORDER — CETIRIZINE HYDROCHLORIDE 10 MG/1
10 TABLET ORAL DAILY
COMMUNITY
Start: 2024-10-10

## 2024-12-17 RX ORDER — AMLODIPINE BESYLATE 5 MG/1
5 TABLET ORAL DAILY
Qty: 90 TABLET | Refills: 0 | Status: SHIPPED | OUTPATIENT
Start: 2024-12-17

## 2024-12-17 RX ORDER — THIAMINE HCL 100 MG
1 TABLET ORAL DAILY
COMMUNITY

## 2024-12-17 RX ORDER — HYDROXYZINE PAMOATE 25 MG/1
25 CAPSULE ORAL 3 TIMES DAILY PRN
Qty: 90 CAPSULE | Refills: 0 | Status: SHIPPED | OUTPATIENT
Start: 2024-12-17 | End: 2025-01-16

## 2024-12-17 RX ORDER — LANOLIN ALCOHOL/MO/W.PET/CERES
400 CREAM (GRAM) TOPICAL 2 TIMES DAILY
COMMUNITY
Start: 2024-09-16

## 2024-12-17 RX ORDER — DIGOXIN 125 MCG
125 TABLET ORAL DAILY
COMMUNITY
Start: 2024-10-16 | End: 2025-10-16

## 2024-12-17 SDOH — ECONOMIC STABILITY: INCOME INSECURITY: HOW HARD IS IT FOR YOU TO PAY FOR THE VERY BASICS LIKE FOOD, HOUSING, MEDICAL CARE, AND HEATING?: NOT HARD AT ALL

## 2024-12-17 SDOH — ECONOMIC STABILITY: FOOD INSECURITY: WITHIN THE PAST 12 MONTHS, THE FOOD YOU BOUGHT JUST DIDN'T LAST AND YOU DIDN'T HAVE MONEY TO GET MORE.: NEVER TRUE

## 2024-12-17 SDOH — ECONOMIC STABILITY: FOOD INSECURITY: WITHIN THE PAST 12 MONTHS, YOU WORRIED THAT YOUR FOOD WOULD RUN OUT BEFORE YOU GOT MONEY TO BUY MORE.: NEVER TRUE

## 2024-12-17 ASSESSMENT — ANXIETY QUESTIONNAIRES
2. NOT BEING ABLE TO STOP OR CONTROL WORRYING: NOT AT ALL
1. FEELING NERVOUS, ANXIOUS, OR ON EDGE: NOT AT ALL
6. BECOMING EASILY ANNOYED OR IRRITABLE: NOT AT ALL
4. TROUBLE RELAXING: SEVERAL DAYS
7. FEELING AFRAID AS IF SOMETHING AWFUL MIGHT HAPPEN: NOT AT ALL
3. WORRYING TOO MUCH ABOUT DIFFERENT THINGS: NOT AT ALL
IF YOU CHECKED OFF ANY PROBLEMS ON THIS QUESTIONNAIRE, HOW DIFFICULT HAVE THESE PROBLEMS MADE IT FOR YOU TO DO YOUR WORK, TAKE CARE OF THINGS AT HOME, OR GET ALONG WITH OTHER PEOPLE: NOT DIFFICULT AT ALL
5. BEING SO RESTLESS THAT IT IS HARD TO SIT STILL: NOT AT ALL
GAD7 TOTAL SCORE: 1

## 2024-12-17 ASSESSMENT — PATIENT HEALTH QUESTIONNAIRE - PHQ9
4. FEELING TIRED OR HAVING LITTLE ENERGY: NOT AT ALL
7. TROUBLE CONCENTRATING ON THINGS, SUCH AS READING THE NEWSPAPER OR WATCHING TELEVISION: NOT AT ALL
SUM OF ALL RESPONSES TO PHQ QUESTIONS 1-9: 0
SUM OF ALL RESPONSES TO PHQ QUESTIONS 1-9: 0
3. TROUBLE FALLING OR STAYING ASLEEP: NOT AT ALL
SUM OF ALL RESPONSES TO PHQ9 QUESTIONS 1 & 2: 0
2. FEELING DOWN, DEPRESSED OR HOPELESS: NOT AT ALL
5. POOR APPETITE OR OVEREATING: NOT AT ALL
10. IF YOU CHECKED OFF ANY PROBLEMS, HOW DIFFICULT HAVE THESE PROBLEMS MADE IT FOR YOU TO DO YOUR WORK, TAKE CARE OF THINGS AT HOME, OR GET ALONG WITH OTHER PEOPLE: NOT DIFFICULT AT ALL
9. THOUGHTS THAT YOU WOULD BE BETTER OFF DEAD, OR OF HURTING YOURSELF: NOT AT ALL
1. LITTLE INTEREST OR PLEASURE IN DOING THINGS: NOT AT ALL
SUM OF ALL RESPONSES TO PHQ QUESTIONS 1-9: 0
6. FEELING BAD ABOUT YOURSELF - OR THAT YOU ARE A FAILURE OR HAVE LET YOURSELF OR YOUR FAMILY DOWN: NOT AT ALL
8. MOVING OR SPEAKING SO SLOWLY THAT OTHER PEOPLE COULD HAVE NOTICED. OR THE OPPOSITE, BEING SO FIGETY OR RESTLESS THAT YOU HAVE BEEN MOVING AROUND A LOT MORE THAN USUAL: NOT AT ALL
SUM OF ALL RESPONSES TO PHQ QUESTIONS 1-9: 0

## 2024-12-17 ASSESSMENT — ENCOUNTER SYMPTOMS
ABDOMINAL PAIN: 0
SHORTNESS OF BREATH: 0

## 2024-12-17 NOTE — ASSESSMENT & PLAN NOTE
Chronic, uncontrolled   Patient stopped losartan due to possibility of neuoropathy SE, however neuropathy has not improved since stopping  Will start amlodipine until patient can see cardiology in January- patient is s/p PCI and needs to control BP, preferably w/ ACE/ARB   Discussed risks/benefits/side effects/alternatives of medication. Using shared decision making patient (or caregiver) elects to pursue treatment.

## 2024-12-17 NOTE — PROGRESS NOTES
After obtaining consent, and per orders of Dr. Wilson, injection of flu vaccine given in Left deltoid by RANCHO QUIROGA MA. Patient instructed to remain in clinic for 20 minutes afterwards, and to report any adverse reaction to me immediately.    
R-0Normal  3. Screening for diabetes mellitus  -     POCT glycosylated hemoglobin (Hb A1C)  4. Anxiety  Assessment & Plan:   Patient has anxiety w/ travel and uses hydroxyzine for long distance travel   Will refill     Orders:  -     hydrOXYzine pamoate (VISTARIL) 25 MG capsule; Take 1 capsule by mouth 3 times daily as needed for Anxiety, Disp-90 capsule, R-0Normal      Health Maintenance Due   Topic    HIV screen     Hepatitis C screen     Shingles vaccine (1 of 2)           No follow-ups on file.  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 Bacon      Results for orders placed or performed in visit on 12/17/24   POCT glycosylated hemoglobin (Hb A1C)   Result Value Ref Range    Hemoglobin A1C 5.0 %       Review of Systems   Constitutional:  Negative for activity change, appetite change, fatigue and fever.   Eyes:  Negative for visual disturbance.   Respiratory:  Negative for shortness of breath.    Cardiovascular:  Negative for chest pain.   Gastrointestinal:  Negative for abdominal pain.   Genitourinary:  Negative for difficulty urinating.      Objective   BP (!) 162/100 (Site: Right Upper Arm, Position: Sitting, Cuff Size: Large Adult)   Pulse 74   Resp 16   Ht 1.88 m (6' 2.02\")   Wt 113.8 kg (250 lb 12.8 oz)   SpO2 96%   BMI 32.19 kg/m²   Physical Exam  Vitals reviewed.   Constitutional:       General: He is not in acute distress.     Appearance: Normal appearance. He is not ill-appearing.   HENT:      Head: Normocephalic and atraumatic.   Eyes:      Extraocular Movements: Extraocular movements intact.   Cardiovascular:      Rate and Rhythm: Normal rate and regular rhythm.

## 2024-12-17 NOTE — ASSESSMENT & PLAN NOTE
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

## 2024-12-19 LAB
ALBUMIN SERPL-MCNC: 4.4 G/DL (ref 3.75–5.01)
ALPHA1 GLOB SERPL ELPH-MCNC: 0.23 G/DL (ref 0.19–0.46)
ALPHA2 GLOB SERPL ELPH-MCNC: 0.56 G/DL (ref 0.48–1.05)
B-GLOBULIN SERPL ELPH-MCNC: 0.7 G/DL (ref 0.48–1.1)
GAMMA GLOB SERPL ELPH-MCNC: 1.11 G/DL (ref 0.62–1.51)
INTERPRETATION SERPL IFE-IMP: NORMAL
PROT SERPL-MCNC: 7 G/DL (ref 6.3–8.2)
PROTEIN ELECTROPHORESIS, SERUM: NORMAL

## 2025-01-03 ENCOUNTER — TELEPHONE (OUTPATIENT)
Dept: CARDIOLOGY | Facility: CLINIC | Age: 65
End: 2025-01-03

## 2025-01-03 ENCOUNTER — APPOINTMENT (OUTPATIENT)
Dept: CARDIOLOGY | Facility: CLINIC | Age: 65
End: 2025-01-03
Payer: COMMERCIAL

## 2025-01-03 VITALS
HEIGHT: 74 IN | HEART RATE: 72 BPM | BODY MASS INDEX: 32.79 KG/M2 | DIASTOLIC BLOOD PRESSURE: 80 MMHG | WEIGHT: 255.5 LBS | SYSTOLIC BLOOD PRESSURE: 134 MMHG

## 2025-01-03 DIAGNOSIS — I10 ESSENTIAL HYPERTENSION: ICD-10-CM

## 2025-01-03 DIAGNOSIS — I25.10 CAD S/P PERCUTANEOUS CORONARY ANGIOPLASTY: ICD-10-CM

## 2025-01-03 DIAGNOSIS — I48.19 PERSISTENT ATRIAL FIBRILLATION (MULTI): ICD-10-CM

## 2025-01-03 DIAGNOSIS — I25.2 HISTORY OF NON-ST ELEVATION MYOCARDIAL INFARCTION (NSTEMI): ICD-10-CM

## 2025-01-03 DIAGNOSIS — M06.9 RHEUMATOID ARTHRITIS, INVOLVING UNSPECIFIED SITE, UNSPECIFIED WHETHER RHEUMATOID FACTOR PRESENT (MULTI): ICD-10-CM

## 2025-01-03 DIAGNOSIS — I65.23 BILATERAL CAROTID ARTERY STENOSIS: ICD-10-CM

## 2025-01-03 DIAGNOSIS — M35.3 POLYMYALGIA RHEUMATICA (MULTI): ICD-10-CM

## 2025-01-03 DIAGNOSIS — Z76.89 ESTABLISHING CARE WITH NEW DOCTOR, ENCOUNTER FOR: ICD-10-CM

## 2025-01-03 DIAGNOSIS — Z86.73 HISTORY OF CVA (CEREBROVASCULAR ACCIDENT): ICD-10-CM

## 2025-01-03 DIAGNOSIS — Z98.61 CAD S/P PERCUTANEOUS CORONARY ANGIOPLASTY: ICD-10-CM

## 2025-01-03 DIAGNOSIS — Z82.49 FAMILY HISTORY OF ISCHEMIC HEART DISEASE: ICD-10-CM

## 2025-01-03 DIAGNOSIS — E78.2 MIXED HYPERLIPIDEMIA: ICD-10-CM

## 2025-01-03 DIAGNOSIS — Z95.818 PRESENCE OF WATCHMAN LEFT ATRIAL APPENDAGE CLOSURE DEVICE: ICD-10-CM

## 2025-01-03 DIAGNOSIS — G60.9 IDIOPATHIC PERIPHERAL NEUROPATHY: ICD-10-CM

## 2025-01-03 DIAGNOSIS — Z78.9 NEVER SMOKED ANY SUBSTANCE: ICD-10-CM

## 2025-01-03 PROCEDURE — 3075F SYST BP GE 130 - 139MM HG: CPT | Performed by: INTERNAL MEDICINE

## 2025-01-03 PROCEDURE — 3008F BODY MASS INDEX DOCD: CPT | Performed by: INTERNAL MEDICINE

## 2025-01-03 PROCEDURE — 3079F DIAST BP 80-89 MM HG: CPT | Performed by: INTERNAL MEDICINE

## 2025-01-03 PROCEDURE — 99214 OFFICE O/P EST MOD 30 MIN: CPT | Performed by: INTERNAL MEDICINE

## 2025-01-03 PROCEDURE — 1036F TOBACCO NON-USER: CPT | Performed by: INTERNAL MEDICINE

## 2025-01-03 RX ORDER — ALCOHOL 2.38 KG/3.79L
1 GEL TOPICAL DAILY
COMMUNITY

## 2025-01-03 RX ORDER — AMLODIPINE BESYLATE 5 MG/1
1 TABLET ORAL DAILY
COMMUNITY
Start: 2024-12-17 | End: 2025-01-03 | Stop reason: ALTCHOICE

## 2025-01-03 RX ORDER — CALCIUM CARBONATE/VITAMIN D3 500-10/5ML
1 LIQUID (ML) ORAL 2 TIMES DAILY
COMMUNITY

## 2025-01-03 RX ORDER — LOSARTAN POTASSIUM 100 MG/1
100 TABLET ORAL DAILY
Qty: 90 TABLET | Refills: 3 | Status: SHIPPED | OUTPATIENT
Start: 2025-01-03

## 2025-01-03 RX ORDER — HYDROXYZINE PAMOATE 25 MG/1
25 CAPSULE ORAL AS NEEDED
COMMUNITY
Start: 2024-12-13

## 2025-01-03 NOTE — TELEPHONE ENCOUNTER
Faxed referral, demographics and office notes to Dr. Mini Leija's office to schedule appointment  and notify patient.

## 2025-01-03 NOTE — PROGRESS NOTES
CARDIOLOGY OFFICE VISIT      CHIEF COMPLAINT      HISTORY OF PRESENT ILLNESS  The patient states that he has been doing well from a cardiac standpoint.  He denies chest discomfort or symptoms of myocardial ischemia.  He has not used nitroglycerin.  He denies dyspnea on exertion.  He denies palpitations at syncope.  He states the only problem he is really having is severe discomfort in his feet.  He did have nerve conduction studies performed which demonstrated peripheral neuropathy.  He did see neurology at [facility] on [facility].  The neurologist was not very helpful to him and he did not get along with the neurologist.  I am going to refer him to another neurologist to further evaluate and treat his peripheral neuropathy which is his main problem.      IMPRESSION:  1.Carotid artery disease, asymptomatic  2. Essential hypertension,  3. Positive family history of coronary artery disease.  4. Hyperlipidemia  5. Obesity  6.  Longstanding persistent atrial fibrillation  7. Polymyalgia rheumatica  8.Possible remote small CVA  9. Non-ST elevation acute coronary syndrome February 1, 2021  10. Coronary artery disease  11. PCI with TATIANA of mid LAD on 2/1/2021  12. Rheumatoid arthritis  13. Watchman, January [facility] at [facility]. Plans to discontinue Plavix therapy in July 2023  14. Persistent Atrial Fib- Following Dr. Sanz  Intolerable to Diltiazem- caused peripheral edema. January 2023  Intolerable to Metoprolol- caused dizziness and coughing. February 2023     Please excuse any errors in grammar or translation related to this dictation. Voice recognition software was utilized to prepare this document       Past Medical History  Past Medical History:   Diagnosis Date    Acute serous otitis media, left ear 05/19/2017    Acute serous otitis media, left ear    Body mass index (BMI) 33.0-33.9, adult 03/15/2022    BMI 33.0-33.9,adult    Encounter for preprocedural cardiovascular examination 02/22/2022    Preop  cardiovascular exam    Other chest pain 11/04/2021    Chest pain, non-cardiac    Other specified counseling 03/15/2022    Encounter for medication counseling    Other specified symptoms and signs involving the circulatory and respiratory systems 11/04/2021    Bruit of right carotid artery    Other specified symptoms and signs involving the circulatory and respiratory systems 11/04/2021    Decreased pedal pulses    Pain in leg, unspecified 11/04/2021    Leg pain    Person consulting for explanation of examination or test findings 03/15/2022    Encounter to discuss test results    Persons encountering health services in other specified circumstances 02/22/2022    Encounter to establish care with new doctor       Social History  Social History     Tobacco Use    Smoking status: Never    Smokeless tobacco: Never   Substance Use Topics    Alcohol use: Not Currently     Comment: very rarely    Drug use: Not Currently       Family History     Family History   Problem Relation Name Age of Onset    Scleroderma Mother      Other (scleroderma involving lung) Mother      Prostate cancer Father      Coronary artery disease Other Family hx         Allergies:  Allergies   Allergen Reactions    Diltiazem Unknown    Metoprolol Succinate Unknown    Oxycodone Unknown        Outpatient Medications:  Current Outpatient Medications   Medication Instructions    albuterol 90 mcg/actuation inhaler 2 puffs, Every 6 hours PRN    amLODIPine (Norvasc) 5 mg tablet 1 tablet, Daily    aspirin 81 mg EC tablet 1 tablet, Daily    b complex 0.4 mg tablet 1 tablet, Daily    digoxin (LANOXIN) 125 mcg, oral, Daily    dofetilide (Tikosyn) 250 mcg capsule Take 2 capsules in the am and one in the pm    fluticasone (Flonase) 50 mcg/actuation nasal spray 2 sprays, Daily    hydrOXYzine pamoate (VISTARIL) 25 mg, As needed    [id number]-[id number] oil 3 mg-35 mg-2 mg -90.314 mg capsule 1 capsule, Daily    losartan (COZAAR) 100 mg, oral, Daily    magnesium  oxide 400 mg magnesium capsule 1 capsule, 2 times daily    nitroglycerin (NITROSTAT) 0.4 mg, sublingual, As needed          REVIEW OF SYSTEMS  Review of Systems   All other systems reviewed and are negative.        VITALS  Vitals:    01/03/25 1014   BP: (!) 162/100   Pulse: 72       PHYSICAL EXAM  Constitutional:       Appearance: Healthy appearance. Not in distress.   Eyes:      Conjunctiva/sclera: Conjunctivae normal.      Pupils: Pupils are equal, round, and reactive to light.   Neck:      Vascular: No JVR. JVD normal.   Pulmonary:      Effort: Pulmonary effort is normal.      Breath sounds: Normal breath sounds. No wheezing. No rhonchi. No rales.   Chest:      Chest wall: Not tender to palpatation.   Cardiovascular:      PMI at left midclavicular line. Normal rate. Regular rhythm. Normal S1. Normal S2.       Murmurs: There is no murmur.      No gallop.  No click. No rub.   Pulses:     Intact distal pulses.   Edema:     Peripheral edema absent.   Abdominal:      Tenderness: There is no abdominal tenderness.   Musculoskeletal: Normal range of motion.         General: No tenderness.      Cervical back: Normal range of motion. Skin:     General: Skin is warm and dry.   Neurological:      General: No focal deficit present.      Mental Status: Alert and oriented to person, place and time.           ASSESSMENT AND PLAN  Diagnoses and all orders for this visit:  Bilateral carotid artery stenosis  Essential hypertension  Family history of ischemic heart disease  Mixed hyperlipidemia  Persistent atrial fibrillation (Multi)  Polymyalgia rheumatica (Multi)  History of CVA (cerebrovascular accident)  History of non-ST elevation myocardial infarction (NSTEMI)  CAD S/P percutaneous coronary angioplasty  Rheumatoid arthritis, involving unspecified site, unspecified whether rheumatoid factor present (Multi)  Presence of Watchman left atrial appendage closure device  Never smoked any substance  Establishing care with new doctor,  encounter for  Idiopathic peripheral neuropathy      [unfilled]

## 2025-01-03 NOTE — PATIENT INSTRUCTIONS
STOP Amlodipine  RESTART Losartan 100 mg daily  You are being referred to Dr. Lr to establish new PCP  You are being referred to Dr. Mini Leija for Neurology  Follow up office visit in 1 year.    Continue same medications/treatment.  Patient educated on proper medication use.  Patient educated on risk factor modification.  Please bring any lab results from other providers / physicians to your next appointment.    Please bring all medicines, vitamins and herbal supplements with you when you come to the office.    Prescriptions will not be filled unless you are compliant with your follow up appointments or have a follow up  appointment scheduled as per instruction of your physician.  Refills should be requested at the time of  Your visit.    Ally VARGHESE LPN, am scribing for and in the presence of Dr. Alberto Franco MD, FACC

## 2025-01-27 ENCOUNTER — APPOINTMENT (OUTPATIENT)
Dept: NEUROLOGY | Facility: CLINIC | Age: 65
End: 2025-01-27
Payer: COMMERCIAL

## 2025-01-27 VITALS
SYSTOLIC BLOOD PRESSURE: 142 MMHG | HEART RATE: 82 BPM | WEIGHT: 250 LBS | DIASTOLIC BLOOD PRESSURE: 68 MMHG | BODY MASS INDEX: 32.08 KG/M2 | HEIGHT: 74 IN

## 2025-01-27 DIAGNOSIS — M79.2 NEUROPATHIC PAIN: ICD-10-CM

## 2025-01-27 DIAGNOSIS — M54.16 LUMBAR RADICULOPATHY: Primary | ICD-10-CM

## 2025-01-27 DIAGNOSIS — G60.9 IDIOPATHIC PERIPHERAL NEUROPATHY: ICD-10-CM

## 2025-01-27 PROCEDURE — 3078F DIAST BP <80 MM HG: CPT | Performed by: STUDENT IN AN ORGANIZED HEALTH CARE EDUCATION/TRAINING PROGRAM

## 2025-01-27 PROCEDURE — 99204 OFFICE O/P NEW MOD 45 MIN: CPT | Performed by: STUDENT IN AN ORGANIZED HEALTH CARE EDUCATION/TRAINING PROGRAM

## 2025-01-27 PROCEDURE — 3077F SYST BP >= 140 MM HG: CPT | Performed by: STUDENT IN AN ORGANIZED HEALTH CARE EDUCATION/TRAINING PROGRAM

## 2025-01-27 PROCEDURE — 3008F BODY MASS INDEX DOCD: CPT | Performed by: STUDENT IN AN ORGANIZED HEALTH CARE EDUCATION/TRAINING PROGRAM

## 2025-01-27 RX ORDER — LANOLIN ALCOHOL/MO/W.PET/CERES
1 CREAM (GRAM) TOPICAL
COMMUNITY
Start: 2024-09-16

## 2025-01-27 ASSESSMENT — PATIENT HEALTH QUESTIONNAIRE - PHQ9
1. LITTLE INTEREST OR PLEASURE IN DOING THINGS: NOT AT ALL
SUM OF ALL RESPONSES TO PHQ9 QUESTIONS 1 & 2: 0
2. FEELING DOWN, DEPRESSED OR HOPELESS: NOT AT ALL

## 2025-01-27 NOTE — LETTER
January 28, 2025     Alberto Franco MD  [address]  [facility] [address]    Patient: Ton Almaraz   YOB: 1960   Date of Visit: 1/27/2025       Dear Dr. Alberto Franco MD:    Thank you for referring Ton Almaraz to me for evaluation. Below are my notes for this consultation.  If you have questions, please do not hesitate to call me. I look forward to following your patient along with you.       Sincerely,     Salome BAEZ Below, DO      CC: No Recipients  ______________________________________________________________________________________       [facility]   Referring: Alberto Franco *  PCP: GUANAKO Tracey  Date of service: 1/28/25    Chief Complaint   Patient presents with   • Numbness     NPV, lbp w/ bilateral leg radiculopathy for years. Pt states it feels as if he's walking on pins and needles. Pt c/o symptoms have continued to get worse. Has seen chiro in past and tried PT w/o relief. Pt has also seen podiatry and orthopedics.       HISTORY OF PRESENT ILLNESS     Subjective    Ton Almaraz is a 64 y.o. right handed male who presents for initial evaluation  of numbness/tingling. Past medical history is significant for hypertension, CAD, afib s/p Watchman. He presents with significant other Shannan who assists in providing history.     For the last year Ton has been experiencing dysesthesias in his legs (burning/tingling > numbness).  It started in the first and second digits of both feet and have over time spread to include both soles, sometimes radiating up to both ankles.  He denies similar symptoms in his hands, only noticing that digit 2 on his left hand can become very cold requiring him to wear gloves.  He admits to associated weakness in the proximal legs only as well as chronic low back pain. He has autonomic symptoms of constipation, dry mouth, dry eyes and limited sweating. He denies muscle  cramping, saddle anesthesia, or bowel/bladder involvement.     Prior workup included EMG of his bilateral lower extremities in in May 2024 which was suggestive of both peripheral neuropathy and superimposed bilateral L5-S1 radiculopathy.  He has had an x-ray of his low back that demonstrated degenerative changes throughout without clear compression. He has never had a MRI of his lumbar spine.     For treatment of his symptoms he has tried a chiropractor and physical therapy which have not helped.  He has tried several over-the-counter topicals and Epsom salts which do not provide lengthy improvement of his symptoms.  He did not like gabapentin.  He has not tried any other oral medications.    Of note he is following hematology for leukopenia which they are monitoring.    Neuropathy risk factors:  - A1c 4.8 (2023)  - B12 - not yet completed  - TSH - not yet completed  - history of alcohol use?  no,   - history of toxin exposure?  none  - history of cancer and chemotherapy exposure? none  - family history of neuropathy? no    Shx: works as ; denies alcohol, tobacco or recreational drug use    Patient Active Problem List   Diagnosis   • Bilateral carotid artery stenosis   • CAD S/P percutaneous coronary angioplasty   • Chest pain   • Essential hypertension   • History of non-ST elevation myocardial infarction (NSTEMI)   • Hypokalemia   • Lower extremity edema   • Mixed hyperlipidemia   • PAD (peripheral artery disease) (CMS-[facility])   • Persistent atrial fibrillation (Multi)   • Polymyalgia rheumatica (Multi)   • TIA (transient ischemic attack)   • Class 1 obesity due to excess calories with body mass index (BMI) of 30.0 to 30.9 in adult   • Class 1 obesity due to excess calories with body mass index (BMI) of 31.0 to 31.9 in adult   • History of CVA (cerebrovascular accident)   • High risk medication use   • Never smoked any substance   • On dofetilide therapy   • Presence of Watchman left atrial appendage  closure device   • Rheumatoid arthritis   • Family history of ischemic heart disease   • Encounter for medication review and counseling   • Encounter to discuss treatment options   • BMI 32.0-32.9,adult     Past Medical History:   Diagnosis Date   • Acute serous otitis media, left ear 05/19/2017    Acute serous otitis media, left ear   • Body mass index (BMI) 33.0-33.9, adult 03/15/2022    BMI 33.0-33.9,adult   • Encounter for preprocedural cardiovascular examination 02/22/2022    Preop cardiovascular exam   • Other chest pain 11/04/2021    Chest pain, non-cardiac   • Other specified counseling 03/15/2022    Encounter for medication counseling   • Other specified symptoms and signs involving the circulatory and respiratory systems 11/04/2021    Bruit of right carotid artery   • Other specified symptoms and signs involving the circulatory and respiratory systems 11/04/2021    Decreased pedal pulses   • Pain in leg, unspecified 11/04/2021    Leg pain   • Person consulting for explanation of examination or test findings 03/15/2022    Encounter to discuss test results   • Persons encountering health services in other specified circumstances 02/22/2022    Encounter to establish care with new doctor     Past Surgical History:   Procedure Laterality Date   • OTHER SURGICAL HISTORY  11/04/2021    Appendectomy   • OTHER SURGICAL HISTORY  11/04/2021    Colonoscopy   • OTHER SURGICAL HISTORY  11/04/2021    Cardioversion   • OTHER SURGICAL HISTORY  11/04/2021    Knee replacement   • OTHER SURGICAL HISTORY  11/17/2021    Cardiac catheterization with stent placement     Social History     Tobacco Use   • Smoking status: Never   • Smokeless tobacco: Never   Substance Use Topics   • Alcohol use: Not Currently     Comment: very rarely     family history includes Coronary artery disease in an other family member; Prostate cancer in his father; Scleroderma in his mother; scleroderma involving lung in his mother.    Current Outpatient  Medications   Medication Instructions   • aspirin 81 mg EC tablet 1 tablet, Daily   • b complex 0.4 mg tablet 1 tablet, Daily   • digoxin (LANOXIN) 125 mcg, oral, Daily   • dofetilide (Tikosyn) 250 mcg capsule Take 2 capsules in the am and one in the pm   • fluticasone (Flonase) 50 mcg/actuation nasal spray 2 sprays, Daily   • hydrOXYzine pamoate (VISTARIL) 25 mg, As needed   • [id number]-[id number]-algal oil 3 mg-35 mg-2 mg -90.314 mg capsule 1 capsule, Daily   • losartan (COZAAR) 100 mg, oral, Daily   • magnesium oxide (Mag-Ox) 400 mg (241.3 mg magnesium) tablet 1 tablet, Every 12 hours scheduled (0630,1830)   • nitroglycerin (NITROSTAT) 0.4 mg, sublingual, As needed     Allergies   Allergen Reactions   • Diltiazem Unknown   • Metoprolol Succinate Unknown   • Oxycodone Unknown       PHYSICAL EXAM     Objective  Neurological Exam  Physical Exam    General Appearance:  No distress, alert, interactive and cooperative.   Skin: No rash present on limbs or extensor surfaces    Neurological:  Mental status: the patient provided an accurate history, language was normal.   Cranial Nerves:  CN 2   Visual fields full to confrontation.   CN 3, 4, 6   Pupils round, 4 mm in diameter, equally reactive to light.   Lids symmetric; no ptosis.   EOMs normal alignment, full range, with normal pursuit and convergence  No nystagmus.   CN 5   Facial sensation intact bilaterally.   CN 7   Normal and symmetric facial strength. Nasolabial folds symmetric.   Able to lift eyebrows and close eye lids with eye lashes buried symmetrically.   CN 8   Hearing intact to conversation.     CN 9, 10   Palate elevates symmetrically.   Phonation within normal limits, no dysarthria.   CN 11   Normal strength of shoulder shrug and neck turning.   CN 12   Tongue midline, with normal bulk and strength; no fasciculations.     Motor:   Muscle bulk: Normal throughout.  Muscle tone: Normal in both upper and lower extremities.  Movements: No fasciculations,  tremors, or other abnormal movement.     Manual Muscle Testing (MMT) reveals the following MRC grades:    R L   Shoulder abduction  5 5  Elbow flexion   5 5  Elbow extension  5 5  Wrist extension  5 5  Wrist flexion   5 5  Finger extension  5 5  Finger flexion   5 5  Finger abduction  5 5  Thumb flexion   5 5  Thumb abduction   5 5    Hip flexion   5 5  Hip abduction    5 5  Hip adduction    5 5  Knee flexion   5 5  Knee extension  5 5  Ankle dorsiflexion  5 5  Ankle plantarflexion  5 5  Ankle Inversion   5 5  Ankle Eversion   5 5  Big toe extension  5 5  Toe flexion   5- 5-    Reflexes:     R          L  BR:               2          2  Biceps:         2          2  Triceps:        2          2  Knee:           2          2  Ankle:          2          2    Babinski: Toes are down going  Caicedo's: Not present  No clonus    Straight leg test: positive bilaterally     Sensory:   In both upper and lower extremities, sensation was intact to light touch  Pinprick: bilateral feet hypersensitive to pinprick on both soles and dorsum (worse on right)  Proprioception: intact in BLE  Vibration: intact in BLE     Coordination:    In both upper extremities, finger-nose-finger was intact without dysmetria or overshoot.   In both lower extremities, heel-to-shin was intact  CHUYITA were intact in both upper and lower extremities.      Gait:   Station was stable with a normal base. Gait was stable with a normal arm swing and speed. No ataxia, shuffling, steppage or waddling was present. No circumduction was present.   Unable to toe walk. Can heel walk.   Tandem gait was intact.   No Romberg sign was present.      RESULTS   Data reviewed:  - Xray 6/15/24  IMPRESSION:  Moderate lumbar degenerative changes, greatest L4-S1 similar to the  previous study.  Minimal anterolisthesis L3-4 from facet arthrosis with no pathologic  motion.    - EMG 5/21/24  Peripheral neuropathy idiopathic.  Patient could be screened for causes of   peripheral  "neuropathy such as diabetes mellitus, hypothyroidism, exposure   to toxins, autoimmune disorder etc.   Simple Rinne imposed bilateral L5-S1 radiculopathy with mild involvement   of the right L4 root   Patient being treated  conservatively   Patient had imaging of the lumbar spine done.   If clinically indicated we could repeat the study in a year       Lab Results   Component Value Date    HGBA1C 4.8 10/18/2023    UTEWBFQV57 373 10/11/2019    TSH 1.81 07/27/2022     No results found for: \"LIGHTCHAIN\", \"LABKAPP\", \"SNNR4KB\", \"HIV1X2\", \"LYMEAB\"     Lab Results   Component Value Date    CKTOTAL 128 09/11/2019     No results found for: \"SPEP\"  No results found for: \"LYMEPCR\", \"LYMEAB\"  No results found for: \"GM1IGG\", \"GM1IGM\", \"GM1QU\"      IMPRESSION AND PLAN   Ton Almaraz is a 65 yo nondiabetic male who presents with 1 year history of gradually progressive dysesthesia in BLE. EMG from 5/2024 was suggestive of mild axonal peripheral neuropathy with superimposed bilateral lower lumbosacral radiculopathies.   Suspected peripheral neuropathy, axonal. Likely mild involvement based on today's exam findings. Unclear etiology however common causes not fully investigated. Will order serum studies.   Suspected bilateral lumbosacral radiculopathies, likely involving of L5-S1. Straight leg test positive bilaterally on today's exam. No red flags in history. Xray from 6/2024 with evidence of degenerative changes without clear impingement. As he has failed PT for his low back pain, will need MRI Lspine wo for further evaluation. Pending results, consider referral to spine clinic/pain management.   Neuropathic pain (tingling/burning > numbness). He does not like gabapentin. Can consider duloxetine vs nortriptyline.     Plan:  - serum studies as listed below  - MRI lumbar spine wo  - consider repeat EMG/NCS pending results of MRI Lspine  - reached out patient's cardiologist about neuropathic pain agent that would not interfere with his " heart medications  - RTC after MRI, ~6-8 weeks  - patient understands and agrees to plan    Diagnoses and all orders for this visit:  Lumbar radiculopathy  -     MR lumbar spine wo IV contrast; Future  -     Follow Up In Neurology; Future  Idiopathic peripheral neuropathy  -     Referral to Neurology  -     Vitamin E; Future  -     Vitamin B6; Future  -     Vitamin B1, Whole Blood; Future  -     TSH with reflex to Free T4 if abnormal; Future  -     Serum Protein Electrophoresis + Immunofixation; Future  -     Drew/Lambda Free Light Chain, Serum; Future  -     C-Reactive Protein; Future  -     Glucose Tolerance Test, 3 hour (Non-Pregnancy); Future  -     JASMEET with Reflex to MYRA; Future  -     Anti-Neutrophilic Cytoplasmic Antibody; Future  -     Anti-SSA; Future  -     Anti-SSB; Future        Patient Instructions   I have ordered a MRI of your low back    I have ordered blood work to get done    I will reach out to your cardiologist about trying a medication for your neuropathy    Follow up after MRI , in about 2 months    Billing and Coding Determination:  Medical decision making was moderate complexity. The patient was seen and evaluated for 1 chronic illness with exacerbation and/or progression, or side effects of treatment. I personally reviewed multiple external notes and multiple test results from multiple sources in the EHR. MRI was ordered for further assessment. I independently interpreted the patient's EMG.      Salome Pearson, DO

## 2025-01-27 NOTE — PROGRESS NOTES
[facility]   Referring: Alberto Franco *  PCP: GUANAKO Tracey  Date of service: 1/28/25    Chief Complaint   Patient presents with    Numbness     NPV, lbp w/ bilateral leg radiculopathy for years. Pt states it feels as if he's walking on pins and needles. Pt c/o symptoms have continued to get worse. Has seen chiro in past and tried PT w/o relief. Pt has also seen podiatry and orthopedics.       HISTORY OF PRESENT ILLNESS     Subjective     Ton Almaraz is a 64 y.o. right handed male who presents for initial evaluation  of numbness/tingling. Past medical history is significant for hypertension, CAD, afib s/p Watchman. He presents with significant other Shannan who assists in providing history.     For the last year Ton has been experiencing dysesthesias in his legs (burning/tingling > numbness).  It started in the first and second digits of both feet and have over time spread to include both soles, sometimes radiating up to both ankles.  He denies similar symptoms in his hands, only noticing that digit 2 on his left hand can become very cold requiring him to wear gloves.  He admits to associated weakness in the proximal legs only as well as chronic low back pain. He has autonomic symptoms of constipation, dry mouth, dry eyes and limited sweating. He denies muscle cramping, saddle anesthesia, or bowel/bladder involvement.     Prior workup included EMG of his bilateral lower extremities in in May 2024 which was suggestive of both peripheral neuropathy and superimposed bilateral L5-S1 radiculopathy.  He has had an x-ray of his low back that demonstrated degenerative changes throughout without clear compression. He has never had a MRI of his lumbar spine.     For treatment of his symptoms he has tried a chiropractor and physical therapy which have not helped.  He has tried several over-the-counter topicals and Epsom salts which do not provide lengthy improvement of his  symptoms.  He did not like gabapentin.  He has not tried any other oral medications.    Of note he is following hematology for leukopenia which they are monitoring.    Neuropathy risk factors:  - A1c 4.8 (2023)  - B12 - not yet completed  - TSH - not yet completed  - history of alcohol use?  no,   - history of toxin exposure?  none  - history of cancer and chemotherapy exposure? none  - family history of neuropathy? no    Shx: works as ; denies alcohol, tobacco or recreational drug use    Patient Active Problem List   Diagnosis    Bilateral carotid artery stenosis    CAD S/P percutaneous coronary angioplasty    Chest pain    Essential hypertension    History of non-ST elevation myocardial infarction (NSTEMI)    Hypokalemia    Lower extremity edema    Mixed hyperlipidemia    PAD (peripheral artery disease) (CMS-[facility])    Persistent atrial fibrillation (Multi)    Polymyalgia rheumatica (Multi)    TIA (transient ischemic attack)    Class 1 obesity due to excess calories with body mass index (BMI) of 30.0 to 30.9 in adult    Class 1 obesity due to excess calories with body mass index (BMI) of 31.0 to 31.9 in adult    History of CVA (cerebrovascular accident)    High risk medication use    Never smoked any substance    On dofetilide therapy    Presence of Watchman left atrial appendage closure device    Rheumatoid arthritis    Family history of ischemic heart disease    Encounter for medication review and counseling    Encounter to discuss treatment options    BMI 32.0-32.9,adult     Past Medical History:   Diagnosis Date    Acute serous otitis media, left ear 05/19/2017    Acute serous otitis media, left ear    Body mass index (BMI) 33.0-33.9, adult 03/15/2022    BMI 33.0-33.9,adult    Encounter for preprocedural cardiovascular examination 02/22/2022    Preop cardiovascular exam    Other chest pain 11/04/2021    Chest pain, non-cardiac    Other specified counseling 03/15/2022    Encounter for medication  counseling    Other specified symptoms and signs involving the circulatory and respiratory systems 11/04/2021    Bruit of right carotid artery    Other specified symptoms and signs involving the circulatory and respiratory systems 11/04/2021    Decreased pedal pulses    Pain in leg, unspecified 11/04/2021    Leg pain    Person consulting for explanation of examination or test findings 03/15/2022    Encounter to discuss test results    Persons encountering health services in other specified circumstances 02/22/2022    Encounter to establish care with new doctor     Past Surgical History:   Procedure Laterality Date    OTHER SURGICAL HISTORY  11/04/2021    Appendectomy    OTHER SURGICAL HISTORY  11/04/2021    Colonoscopy    OTHER SURGICAL HISTORY  11/04/2021    Cardioversion    OTHER SURGICAL HISTORY  11/04/2021    Knee replacement    OTHER SURGICAL HISTORY  11/17/2021    Cardiac catheterization with stent placement     Social History     Tobacco Use    Smoking status: Never    Smokeless tobacco: Never   Substance Use Topics    Alcohol use: Not Currently     Comment: very rarely     family history includes Coronary artery disease in an other family member; Prostate cancer in his father; Scleroderma in his mother; scleroderma involving lung in his mother.    Current Outpatient Medications   Medication Instructions    aspirin 81 mg EC tablet 1 tablet, Daily    b complex 0.4 mg tablet 1 tablet, Daily    digoxin (LANOXIN) 125 mcg, oral, Daily    dofetilide (Tikosyn) 250 mcg capsule Take 2 capsules in the am and one in the pm    fluticasone (Flonase) 50 mcg/actuation nasal spray 2 sprays, Daily    hydrOXYzine pamoate (VISTARIL) 25 mg, As needed    cfbzwybdm-C5-avJ08-algal oil 3 mg-35 mg-2 mg -90.314 mg capsule 1 capsule, Daily    losartan (COZAAR) 100 mg, oral, Daily    magnesium oxide (Mag-Ox) 400 mg (241.3 mg magnesium) tablet 1 tablet, Every 12 hours scheduled (0630,1830)    nitroglycerin (NITROSTAT) 0.4 mg, sublingual,  As needed     Allergies   Allergen Reactions    Diltiazem Unknown    Metoprolol Succinate Unknown    Oxycodone Unknown       PHYSICAL EXAM     Objective   Neurological Exam  Physical Exam    General Appearance:  No distress, alert, interactive and cooperative.   Skin: No rash present on limbs or extensor surfaces    Neurological:  Mental status: the patient provided an accurate history, language was normal.   Cranial Nerves:  CN 2   Visual fields full to confrontation.   CN 3, 4, 6   Pupils round, 4 mm in diameter, equally reactive to light.   Lids symmetric; no ptosis.   EOMs normal alignment, full range, with normal pursuit and convergence  No nystagmus.   CN 5   Facial sensation intact bilaterally.   CN 7   Normal and symmetric facial strength. Nasolabial folds symmetric.   Able to lift eyebrows and close eye lids with eye lashes buried symmetrically.   CN 8   Hearing intact to conversation.     CN 9, 10   Palate elevates symmetrically.   Phonation within normal limits, no dysarthria.   CN 11   Normal strength of shoulder shrug and neck turning.   CN 12   Tongue midline, with normal bulk and strength; no fasciculations.     Motor:   Muscle bulk: Normal throughout.  Muscle tone: Normal in both upper and lower extremities.  Movements: No fasciculations, tremors, or other abnormal movement.     Manual Muscle Testing (MMT) reveals the following MRC grades:    R L   Shoulder abduction  5 5  Elbow flexion   5 5  Elbow extension  5 5  Wrist extension  5 5  Wrist flexion   5 5  Finger extension  5 5  Finger flexion   5 5  Finger abduction  5 5  Thumb flexion   5 5  Thumb abduction   5 5    Hip flexion   5 5  Hip abduction    5 5  Hip adduction    5 5  Knee flexion   5 5  Knee extension  5 5  Ankle dorsiflexion  5 5  Ankle plantarflexion  5 5  Ankle Inversion   5 5  Ankle Eversion   5 5  Big toe extension  5 5  Toe flexion   5- 5-    Reflexes:     R          L  BR:               2          2  Biceps:         2           "2  Triceps:        2          2  Knee:           2          2  Ankle:          2          2    Babinski: Toes are down going  Caicedo's: Not present  No clonus    Straight leg test: positive bilaterally     Sensory:   In both upper and lower extremities, sensation was intact to light touch  Pinprick: bilateral feet hypersensitive to pinprick on both soles and dorsum (worse on right)  Proprioception: intact in BLE  Vibration: intact in BLE     Coordination:    In both upper extremities, finger-nose-finger was intact without dysmetria or overshoot.   In both lower extremities, heel-to-shin was intact  CHUYITA were intact in both upper and lower extremities.      Gait:   Station was stable with a normal base. Gait was stable with a normal arm swing and speed. No ataxia, shuffling, steppage or waddling was present. No circumduction was present.   Unable to toe walk. Can heel walk.   Tandem gait was intact.   No Romberg sign was present.      RESULTS   Data reviewed:  - Xray 6/15/24  IMPRESSION:  Moderate lumbar degenerative changes, greatest L4-S1 similar to the  previous study.  Minimal anterolisthesis L3-4 from facet arthrosis with no pathologic  motion.    - EMG 5/21/24  Peripheral neuropathy idiopathic.  Patient could be screened for causes of   peripheral neuropathy such as diabetes mellitus, hypothyroidism, exposure   to toxins, autoimmune disorder etc.   Simple Rinne imposed bilateral L5-S1 radiculopathy with mild involvement   of the right L4 root   Patient being treated  conservatively   Patient had imaging of the lumbar spine done.   If clinically indicated we could repeat the study in a year       Lab Results   Component Value Date    HGBA1C 4.8 10/18/2023    DCYGSTFH78 373 10/11/2019    TSH 1.81 07/27/2022     No results found for: \"LIGHTCHAIN\", \"LABKAPP\", \"HPHP6WS\", \"HIV1X2\", \"LYMEAB\"     Lab Results   Component Value Date    CKTOTAL 128 09/11/2019     No results found for: \"SPEP\"  No results found for: " "\"LYMEPCR\", \"LYMEAB\"  No results found for: \"GM1IGG\", \"GM1IGM\", \"GM1QU\"      IMPRESSION AND PLAN   Ton Almaraz is a 63 yo nondiabetic male who presents with 1 year history of gradually progressive dysesthesia in BLE. EMG from 5/2024 was suggestive of mild axonal peripheral neuropathy with superimposed bilateral lower lumbosacral radiculopathies.   Suspected peripheral neuropathy, axonal. Likely mild involvement based on today's exam findings. Unclear etiology however common causes not fully investigated. Will order serum studies.   Suspected bilateral lumbosacral radiculopathies, likely involving of L5-S1. Straight leg test positive bilaterally on today's exam. No red flags in history. Xray from 6/2024 with evidence of degenerative changes without clear impingement. As he has failed PT for his low back pain, will need MRI Lspine wo for further evaluation. Pending results, consider referral to spine clinic/pain management.   Neuropathic pain (tingling/burning > numbness). He does not like gabapentin. Can consider duloxetine vs nortriptyline.     Plan:  - serum studies as listed below  - MRI lumbar spine wo  - consider repeat EMG/NCS pending results of MRI Lspine  - reached out patient's cardiologist about neuropathic pain agent that would not interfere with his heart medications  - RTC after MRI, ~6-8 weeks  - patient understands and agrees to plan    Diagnoses and all orders for this visit:  Lumbar radiculopathy  -     MR lumbar spine wo IV contrast; Future  -     Follow Up In Neurology; Future  Idiopathic peripheral neuropathy  -     Referral to Neurology  -     Vitamin E; Future  -     Vitamin B6; Future  -     Vitamin B1, Whole Blood; Future  -     TSH with reflex to Free T4 if abnormal; Future  -     Serum Protein Electrophoresis + Immunofixation; Future  -     [place]/Lambda Free Light Chain, Serum; Future  -     C-Reactive Protein; Future  -     Glucose Tolerance Test, 3 hour (Non-Pregnancy); Future  -     JASMEET " with Reflex to MYRA; Future  -     Anti-Neutrophilic Cytoplasmic Antibody; Future  -     Anti-SSA; Future  -     Anti-SSB; Future        Patient Instructions   I have ordered a MRI of your low back    I have ordered blood work to get done    I will reach out to your cardiologist about trying a medication for your neuropathy    Follow up after MRI , in about 2 months    Billing and Coding Determination:  Medical decision making was moderate complexity. The patient was seen and evaluated for 1 chronic illness with exacerbation and/or progression, or side effects of treatment. I personally reviewed multiple external notes and multiple test results from multiple sources in the EHR. MRI was ordered for further assessment. I independently interpreted the patient's EMG.      Salome Pearson, DO    Addendum 1/28/25 - Discussed case with his cardiologist and there are no interactions between duloxetine and digoxin. Will proceed with starting duloxetine 30mg qD

## 2025-01-27 NOTE — PATIENT INSTRUCTIONS
I have ordered a MRI of your low back    I have ordered blood work to get done    I will reach out to your cardiologist about trying a medication for your neuropathy    Follow up after MRI , in about 2 months

## 2025-01-28 ENCOUNTER — TELEPHONE (OUTPATIENT)
Dept: CARDIOLOGY | Facility: CLINIC | Age: 65
End: 2025-01-28
Payer: COMMERCIAL

## 2025-01-28 RX ORDER — DULOXETIN HYDROCHLORIDE 30 MG/1
30 CAPSULE, DELAYED RELEASE ORAL DAILY
Qty: 30 CAPSULE | Refills: 2 | Status: SHIPPED | OUTPATIENT
Start: 2025-01-28 | End: 2026-01-28

## 2025-01-28 NOTE — TELEPHONE ENCOUNTER
----- Message from Alberto Franco sent at 1/28/2025 12:59 PM EST -----  Regarding: FW: mutual patient - medication management  Please check to see if any interaction between these 2 medications  ----- Message -----  From: Salome Pearson,   Sent: 1/28/2025  11:14 AM EST  To: Alberto Franco MD  Subject: mutual patient - medication management           Dear Dr. Franco,  I am seeing our mutual patient, Ton, for neuropathic pain/lumbar radiculopathy. I would like to start him on medication for his pain, preferably duloxetine, but I wanted to confirm this would not interfere with his digoxin. If there is another agent you would prefer, we can do that as well (he has not liked gabapentin in the past).  Thanks,  Dee Dee Pearson

## 2025-02-03 ENCOUNTER — APPOINTMENT (OUTPATIENT)
Dept: PRIMARY CARE | Facility: CLINIC | Age: 65
End: 2025-02-03
Payer: COMMERCIAL

## 2025-02-03 VITALS
RESPIRATION RATE: 18 BRPM | HEIGHT: 74 IN | HEART RATE: 74 BPM | DIASTOLIC BLOOD PRESSURE: 74 MMHG | WEIGHT: 250 LBS | BODY MASS INDEX: 32.08 KG/M2 | TEMPERATURE: 97 F | OXYGEN SATURATION: 96 % | SYSTOLIC BLOOD PRESSURE: 135 MMHG

## 2025-02-03 DIAGNOSIS — K59.09 OTHER CONSTIPATION: ICD-10-CM

## 2025-02-03 DIAGNOSIS — Z98.61 CAD S/P PERCUTANEOUS CORONARY ANGIOPLASTY: ICD-10-CM

## 2025-02-03 DIAGNOSIS — R35.1 BENIGN PROSTATIC HYPERPLASIA WITH NOCTURIA: Primary | ICD-10-CM

## 2025-02-03 DIAGNOSIS — N40.1 BENIGN PROSTATIC HYPERPLASIA WITH NOCTURIA: Primary | ICD-10-CM

## 2025-02-03 DIAGNOSIS — E78.2 MIXED HYPERLIPIDEMIA: ICD-10-CM

## 2025-02-03 DIAGNOSIS — I25.2 HISTORY OF NON-ST ELEVATION MYOCARDIAL INFARCTION (NSTEMI): ICD-10-CM

## 2025-02-03 DIAGNOSIS — I20.89 OTHER FORMS OF ANGINA PECTORIS: ICD-10-CM

## 2025-02-03 DIAGNOSIS — I25.10 CAD S/P PERCUTANEOUS CORONARY ANGIOPLASTY: ICD-10-CM

## 2025-02-03 DIAGNOSIS — I10 ESSENTIAL HYPERTENSION: ICD-10-CM

## 2025-02-03 DIAGNOSIS — Z76.89 ESTABLISHING CARE WITH NEW DOCTOR, ENCOUNTER FOR: ICD-10-CM

## 2025-02-03 PROCEDURE — 3008F BODY MASS INDEX DOCD: CPT | Performed by: FAMILY MEDICINE

## 2025-02-03 PROCEDURE — 3078F DIAST BP <80 MM HG: CPT | Performed by: FAMILY MEDICINE

## 2025-02-03 PROCEDURE — 1036F TOBACCO NON-USER: CPT | Performed by: FAMILY MEDICINE

## 2025-02-03 PROCEDURE — 99214 OFFICE O/P EST MOD 30 MIN: CPT | Performed by: FAMILY MEDICINE

## 2025-02-03 PROCEDURE — 3075F SYST BP GE 130 - 139MM HG: CPT | Performed by: FAMILY MEDICINE

## 2025-02-03 RX ORDER — ROSUVASTATIN CALCIUM 20 MG/1
20 TABLET, COATED ORAL DAILY
Qty: 30 TABLET | Refills: 10 | Status: SHIPPED | OUTPATIENT
Start: 2025-02-03 | End: 2026-03-10

## 2025-02-03 RX ORDER — TAMSULOSIN HYDROCHLORIDE 0.4 MG/1
0.4 CAPSULE ORAL DAILY
Qty: 30 CAPSULE | Refills: 11 | Status: SHIPPED | OUTPATIENT
Start: 2025-02-03 | End: 2026-02-03

## 2025-02-03 ASSESSMENT — ENCOUNTER SYMPTOMS
DYSURIA: 0
FREQUENCY: 1
BACK PAIN: 1
FATIGUE: 1
NAUSEA: 0
ABDOMINAL PAIN: 1
VOMITING: 0
NUMBNESS: 1
CONSTIPATION: 1

## 2025-02-03 ASSESSMENT — PATIENT HEALTH QUESTIONNAIRE - PHQ9
2. FEELING DOWN, DEPRESSED OR HOPELESS: NOT AT ALL
1. LITTLE INTEREST OR PLEASURE IN DOING THINGS: NOT AT ALL
SUM OF ALL RESPONSES TO PHQ9 QUESTIONS 1 AND 2: 0

## 2025-02-03 NOTE — PATIENT INSTRUCTIONS
Metamucil  take daily     Please consider exercise program involving walking or some other form of aerobic activity 5 days weekly for 30 minutes... Let's also consider strengthening of large muscle groups like the abdominal muscles or shoulder muscles... Twice weekly with reps of 5/10/15 exercises and gradually increase strength.. This is not heavy strength training but light weight training... Sit ups or back exercise routine.. Please ask for handout if uncertain how to do..This  will help to strengthen your muscles which in turn will help you to lose weight.... You might ask what is the best diet available.. I would strongly encourage you to consider  Weight Watchers.. And as  your  fellow on  Weight Watchers physician attempting to  live this  LIFE  style  choice with you....  I will be glad to give you recommendations on what to eat.. Consider buying Katarina bread.., katarina bagle thin bread.. oikos yogurt... eggs  to eat as hard-boiled... Halo top ice cream for snack... All these are delicious foods which.. when eaten and  being compliant eating three  meals daily  breakfast lunch and dinner, drinking  64 ounces of water daily we will all win together !!!!!!!. This will be a means for you to lose weight... Consider also the smart phone vanna ... My plate.. Or My  fitness  pal..,  as additional possibilities for weight loss... Amandeep Lr!    Discussed medication side effects.  The  risk benefits and treatment options  discussed with patient.       Please schedule follow-up appointment based upon your improvement/failure to improve/chronic medical conditions and physician recommendations during office appointment at the .       Patient advised to go to er if symptoms worsen or to call answering service, or to return to office for additional evaluation    This note was partially  generated using Dragon voice recognition and there may be incorrect words, wording, spelling, or pronunciation  errors that were not corrected prior to committing the note to the medical record.

## 2025-02-03 NOTE — ASSESSMENT & PLAN NOTE
Had will make her patient not on statin at this time we will check lipids and advised to take statin and explained implications

## 2025-02-03 NOTE — PROGRESS NOTES
Subjective   Patient ID: Ton Almaraz is a 64 y.o. male who presents for Establish Care.  Benign Prostatic Hypertrophy  This is a recurrent problem. The current episode started more than 1 year ago. The problem has been gradually worsening since onset. Irritative symptoms include frequency, nocturia and urgency. Obstructive symptoms include dribbling, incomplete emptying, an intermittent stream, a slower stream and a weak stream. Pertinent negatives include no dysuria, nausea or vomiting. He is sexually active. Past treatments include nothing.   Constipation  This is a new problem. The current episode started more than 1 year ago. The problem is unchanged. The stool is described as pellet like. The patient is not on a high fiber diet. He Does not exercise regularly. There has Not been adequate water intake. Associated symptoms include abdominal pain and back pain. Pertinent negatives include no nausea or vomiting. Risk factors include recent dehydration.     Nocturia  Constipation     Neck and  back  pain... numbness    Cororanry artery  disease      .. S/p    widowmaker  Review of Systems   Constitutional:  Positive for fatigue.   Cardiovascular:  Negative for chest pain.   Gastrointestinal:  Positive for abdominal pain and constipation. Negative for nausea and vomiting.   Genitourinary:  Positive for frequency, incomplete emptying, nocturia and urgency. Negative for dysuria.        Nocturia   Musculoskeletal:  Positive for back pain.   Neurological:  Positive for numbness.       Objective   Physical Exam  Vitals: I have reviewed the vitals  General: Well-developed.  In no acute distress.  Eyes:   sclera nonicteric.  Conjunctiva not injected.  No discharge.   HEAD: Normocephalic, atraumatic.  HEENT   Mucous membranes moist.  Posterior oropharynx nonerythematous, no tonsillar exudates.      No cervical lymphadenopathy.  Cardio: Regular rate and rhythm.  No murmur, rub or gallop.  Pulmonary: Lungs clear to  auscultation in all fields.  No accessory muscle use.  GI/: Normal active bowel sounds.  Soft, nontender.  No masses or organomegaly appreciated.  Musculoskeletal: No gross deformities appreciated.  Neuro: Alert, age-appropriate.  Normal muscle tone.  Moving all extremities.  Skin: No rash, bruises or lesions.   Labs        Assessment/Plan   Problem List Items Addressed This Visit       CAD S/P percutaneous coronary angioplasty     Had will make her patient not on statin at this time we will check lipids and advised to take statin and explained implications         Chest pain    Essential hypertension    Relevant Orders    Comprehensive Metabolic Panel    CBC and Auto Differential    History of non-ST elevation myocardial infarction (NSTEMI)    Relevant Medications    rosuvastatin (Crestor) 20 mg tablet    Other Relevant Orders    Lipid Panel    Mixed hyperlipidemia    Relevant Orders    Thyroxine, Free    Thyroid Stimulating Hormone    Triiodothyronine, Free    Benign prostatic hyperplasia with lower urinary tract symptoms - Primary     Discussed with patient lets try medication and reviewed meds.  Recheck 4 weeks         Relevant Medications    tamsulosin (Flomax) 0.4 mg 24 hr capsule    Other Relevant Orders    Prostate Specific Antigen, Screen    Other constipation     Discussed with patient increase fluids to 60 ounces daily lets try Metamucil follow-up office and          Other Visit Diagnoses       Establishing care with new doctor, encounter for

## 2025-02-13 ENCOUNTER — HOSPITAL ENCOUNTER (OUTPATIENT)
Dept: RADIOLOGY | Facility: HOSPITAL | Age: 65
Discharge: HOME | End: 2025-02-13
Payer: COMMERCIAL

## 2025-02-13 DIAGNOSIS — D69.6 THROMBOCYTOPENIA, UNSPECIFIED (CMS-HCC): ICD-10-CM

## 2025-02-13 DIAGNOSIS — M54.16 LUMBAR RADICULOPATHY: ICD-10-CM

## 2025-02-13 PROCEDURE — 72148 MRI LUMBAR SPINE W/O DYE: CPT

## 2025-02-13 PROCEDURE — 76705 ECHO EXAM OF ABDOMEN: CPT

## 2025-02-14 ENCOUNTER — TELEPHONE (OUTPATIENT)
Dept: PRIMARY CARE | Facility: CLINIC | Age: 65
End: 2025-02-14
Payer: COMMERCIAL

## 2025-02-14 DIAGNOSIS — D69.6 THROMBOCYTOPENIA (CMS-HCC): Primary | ICD-10-CM

## 2025-02-14 LAB
ALBUMIN SERPL-MCNC: 4.1 G/DL (ref 3.6–5.1)
ALP SERPL-CCNC: 83 U/L (ref 35–144)
ALT SERPL-CCNC: 24 U/L (ref 9–46)
ANION GAP SERPL CALCULATED.4IONS-SCNC: 7 MMOL/L (CALC) (ref 7–17)
AST SERPL-CCNC: 35 U/L (ref 10–35)
BASOPHILS # BLD AUTO: 19 CELLS/UL (ref 0–200)
BASOPHILS NFR BLD AUTO: 0.5 %
BILIRUB SERPL-MCNC: 0.9 MG/DL (ref 0.2–1.2)
BUN SERPL-MCNC: 14 MG/DL (ref 7–25)
CALCIUM SERPL-MCNC: 9 MG/DL (ref 8.6–10.3)
CHLORIDE SERPL-SCNC: 105 MMOL/L (ref 98–110)
CHOLEST SERPL-MCNC: 153 MG/DL
CHOLEST/HDLC SERPL: 3.6 (CALC)
CO2 SERPL-SCNC: 30 MMOL/L (ref 20–32)
CREAT SERPL-MCNC: 1.08 MG/DL (ref 0.7–1.35)
EGFRCR SERPLBLD CKD-EPI 2021: 77 ML/MIN/1.73M2
EOSINOPHIL # BLD AUTO: 41 CELLS/UL (ref 15–500)
EOSINOPHIL NFR BLD AUTO: 1.1 %
ERYTHROCYTE [DISTWIDTH] IN BLOOD BY AUTOMATED COUNT: 13.5 % (ref 11–15)
GLUCOSE SERPL-MCNC: 107 MG/DL (ref 65–99)
HCT VFR BLD AUTO: 43.1 % (ref 38.5–50)
HDLC SERPL-MCNC: 43 MG/DL
HGB BLD-MCNC: 14.3 G/DL (ref 13.2–17.1)
LDLC SERPL CALC-MCNC: 95 MG/DL (CALC)
LYMPHOCYTES # BLD AUTO: 1206 CELLS/UL (ref 850–3900)
LYMPHOCYTES NFR BLD AUTO: 32.6 %
MCH RBC QN AUTO: 29.5 PG (ref 27–33)
MCHC RBC AUTO-ENTMCNC: 33.2 G/DL (ref 32–36)
MCV RBC AUTO: 88.9 FL (ref 80–100)
MONOCYTES # BLD AUTO: 592 CELLS/UL (ref 200–950)
MONOCYTES NFR BLD AUTO: 16 %
NEUTROPHILS # BLD AUTO: 1843 CELLS/UL (ref 1500–7800)
NEUTROPHILS NFR BLD AUTO: 49.8 %
NONHDLC SERPL-MCNC: 110 MG/DL (CALC)
PLATELET # BLD AUTO: 45 THOUSAND/UL (ref 140–400)
PMV BLD REES-ECKER: 14.3 FL (ref 7.5–12.5)
POTASSIUM SERPL-SCNC: 3.6 MMOL/L (ref 3.5–5.3)
PROT SERPL-MCNC: 6.7 G/DL (ref 6.1–8.1)
PSA SERPL-MCNC: 1.2 NG/ML
RBC # BLD AUTO: 4.85 MILLION/UL (ref 4.2–5.8)
SERVICE CMNT-IMP: ABNORMAL
SODIUM SERPL-SCNC: 142 MMOL/L (ref 135–146)
T3FREE SERPL-MCNC: 3.4 PG/ML (ref 2.3–4.2)
T4 FREE SERPL-MCNC: 1.2 NG/DL (ref 0.8–1.8)
TRIGL SERPL-MCNC: 64 MG/DL
TSH SERPL-ACNC: 1.39 MIU/L (ref 0.4–4.5)
WBC # BLD AUTO: 3.7 THOUSAND/UL (ref 3.8–10.8)

## 2025-02-14 NOTE — TELEPHONE ENCOUNTER
LVM  for pt advising him of his results per Dr. Lr and advised him to get the blood drawn and to watch for bleeding and or rash/ bruising. If any of the symptoms appear to go to the ER per doctor. I also advised him that he is to be seen Monday and to call us so we can put him on the schedule due to his lab results.

## 2025-02-14 NOTE — PROGRESS NOTES
Please call and advise low platelet count and I am concerned about this.  If he has any bleeding or skin rash please go to the emergency room otherwise get blood count tomorrow and will need to see in the office on Monday.  If he has any symptoms of bleeding please go to ER.

## 2025-02-17 ENCOUNTER — APPOINTMENT (OUTPATIENT)
Dept: PRIMARY CARE | Facility: CLINIC | Age: 65
End: 2025-02-17
Payer: COMMERCIAL

## 2025-02-17 ENCOUNTER — TELEPHONE (OUTPATIENT)
Dept: NEUROLOGY | Facility: HOSPITAL | Age: 65
End: 2025-02-17

## 2025-02-17 VITALS
TEMPERATURE: 97 F | BODY MASS INDEX: 31.95 KG/M2 | SYSTOLIC BLOOD PRESSURE: 124 MMHG | WEIGHT: 249 LBS | HEART RATE: 67 BPM | RESPIRATION RATE: 18 BRPM | HEIGHT: 74 IN | DIASTOLIC BLOOD PRESSURE: 78 MMHG | OXYGEN SATURATION: 95 %

## 2025-02-17 DIAGNOSIS — N28.1 RENAL CYST: ICD-10-CM

## 2025-02-17 DIAGNOSIS — K76.0 FATTY LIVER: ICD-10-CM

## 2025-02-17 DIAGNOSIS — L30.9 DERMATITIS: ICD-10-CM

## 2025-02-17 DIAGNOSIS — D69.6 THROMBOCYTOPENIA (CMS-HCC): ICD-10-CM

## 2025-02-17 DIAGNOSIS — K76.0 HEPATIC STEATOSIS: Primary | ICD-10-CM

## 2025-02-17 DIAGNOSIS — M54.16 LUMBAR RADICULOPATHY: Primary | ICD-10-CM

## 2025-02-17 PROCEDURE — 3074F SYST BP LT 130 MM HG: CPT | Performed by: FAMILY MEDICINE

## 2025-02-17 PROCEDURE — 99213 OFFICE O/P EST LOW 20 MIN: CPT | Performed by: FAMILY MEDICINE

## 2025-02-17 PROCEDURE — 3008F BODY MASS INDEX DOCD: CPT | Performed by: FAMILY MEDICINE

## 2025-02-17 PROCEDURE — 3078F DIAST BP <80 MM HG: CPT | Performed by: FAMILY MEDICINE

## 2025-02-17 RX ORDER — AMMONIUM LACTATE 12 G/100G
LOTION TOPICAL AS NEEDED
Qty: 396 G | Refills: 3 | Status: SHIPPED | OUTPATIENT
Start: 2025-02-17

## 2025-02-17 ASSESSMENT — PATIENT HEALTH QUESTIONNAIRE - PHQ9
SUM OF ALL RESPONSES TO PHQ9 QUESTIONS 1 AND 2: 0
2. FEELING DOWN, DEPRESSED OR HOPELESS: NOT AT ALL
1. LITTLE INTEREST OR PLEASURE IN DOING THINGS: NOT AT ALL

## 2025-02-17 NOTE — PROGRESS NOTES
Subjective 7 with  Patient ID: Ton Almaraz is a 64 y.o. male who presents for review labs.  HPI  Here for evaluation of low platelet count  He denies skin rash..  Or bleeding gums.  Admits to having seen hematology  Reviewed  2/13 blood count with patient results of WBC low with platelet count of 45,000  According to the patient here ultrasound of liver ordered by hematology  Patient admits to rash on legs with itching.      Review of Systems   HENT:  Negative for mouth sores.    Skin:  Negative for rash.       Objective   Physical Exam  general: alert oriented x three  HEENT hearing normal to voice  Neck supple  Lungs respirations non-labored.  Cardiovascular: no peripheral edema  Skin: warm and dry with exanthem noted bilateral lower extremities thrombocytopenia  Psych: judgement and insight normal  Musculoskeletal:  ambulation normal,    lymph:negative cervical  LYMPADENOPATHY  thyroid: non palpable enlargement   Labs        Assessment/Plan   Problem List Items Addressed This Visit       Thrombocytopenia (CMS-HCC)     Discussed with patient and uncertain if this is due to the aspirin.   or possible hematological issue..  Will hematology opinion and advised to call since he is already seen.         Fatty liver     Discussed with patient ultrasound of liver showing hepatic steatosis with well-defined area measuring 4.2 cm adjacent to the gallbladder this could represent fat sparing region however dedicated liver MRI with IV contrast recommended no splenomegaly noted left renal cyst measuring 7.2 cm.         Renal cyst     Left renal cyst 7.2 cm will seek urology opinion         Relevant Orders    Referral to Urology     Other Visit Diagnoses       Hepatic steatosis    -  Primary    Relevant Orders    MR liver w and wo IV contrast    Referral to Hepatology    Dermatitis        Relevant Medications    ammonium lactate (Lac-Hydrin) 12 % lotion

## 2025-02-18 PROBLEM — D69.6 THROMBOCYTOPENIA (CMS-HCC): Status: ACTIVE | Noted: 2025-02-18

## 2025-02-18 PROBLEM — K76.0 FATTY LIVER: Status: ACTIVE | Noted: 2025-02-18

## 2025-02-18 PROBLEM — N28.1 RENAL CYST: Status: ACTIVE | Noted: 2025-02-18

## 2025-02-18 NOTE — ASSESSMENT & PLAN NOTE
Discussed with patient and uncertain if this is due to the aspirin.   or possible hematological issue..  Will hematology opinion and advised to call since he is already seen.

## 2025-02-18 NOTE — PATIENT INSTRUCTIONS

## 2025-02-18 NOTE — ASSESSMENT & PLAN NOTE
Discussed with patient ultrasound of liver showing hepatic steatosis with well-defined area measuring 4.2 cm adjacent to the gallbladder this could represent fat sparing region however dedicated liver MRI with IV contrast recommended no splenomegaly noted left renal cyst measuring 7.2 cm.

## 2025-02-25 ENCOUNTER — OFFICE VISIT (OUTPATIENT)
Dept: NEUROSURGERY | Facility: CLINIC | Age: 65
End: 2025-02-25
Payer: COMMERCIAL

## 2025-02-25 VITALS
HEIGHT: 74 IN | DIASTOLIC BLOOD PRESSURE: 80 MMHG | SYSTOLIC BLOOD PRESSURE: 114 MMHG | WEIGHT: 248.2 LBS | HEART RATE: 69 BPM | TEMPERATURE: 97.5 F | BODY MASS INDEX: 31.85 KG/M2

## 2025-02-25 DIAGNOSIS — M54.16 LUMBAR RADICULOPATHY: ICD-10-CM

## 2025-02-25 DIAGNOSIS — G57.53 TARSAL TUNNEL SYNDROME OF BOTH LOWER EXTREMITIES: ICD-10-CM

## 2025-02-25 DIAGNOSIS — G61.89 OTHER INFLAMMATORY POLYNEUROPATHIES: Primary | ICD-10-CM

## 2025-02-25 PROCEDURE — 1036F TOBACCO NON-USER: CPT | Performed by: PHYSICIAN ASSISTANT

## 2025-02-25 PROCEDURE — 3008F BODY MASS INDEX DOCD: CPT | Performed by: PHYSICIAN ASSISTANT

## 2025-02-25 PROCEDURE — 99204 OFFICE O/P NEW MOD 45 MIN: CPT | Performed by: PHYSICIAN ASSISTANT

## 2025-02-25 PROCEDURE — 3074F SYST BP LT 130 MM HG: CPT | Performed by: PHYSICIAN ASSISTANT

## 2025-02-25 PROCEDURE — 3079F DIAST BP 80-89 MM HG: CPT | Performed by: PHYSICIAN ASSISTANT

## 2025-02-25 ASSESSMENT — LIFESTYLE VARIABLES
HOW OFTEN DO YOU HAVE A DRINK CONTAINING ALCOHOL: NEVER
SKIP TO QUESTIONS 9-10: 1
AUDIT-C TOTAL SCORE: 0
HOW MANY STANDARD DRINKS CONTAINING ALCOHOL DO YOU HAVE ON A TYPICAL DAY: PATIENT DOES NOT DRINK
HOW OFTEN DO YOU HAVE SIX OR MORE DRINKS ON ONE OCCASION: NEVER

## 2025-02-25 ASSESSMENT — PATIENT HEALTH QUESTIONNAIRE - PHQ9
SUM OF ALL RESPONSES TO PHQ9 QUESTIONS 1 & 2: 0
2. FEELING DOWN, DEPRESSED OR HOPELESS: NOT AT ALL
1. LITTLE INTEREST OR PLEASURE IN DOING THINGS: NOT AT ALL

## 2025-02-25 ASSESSMENT — PAIN SCALES - GENERAL: PAINLEVEL_OUTOF10: 6

## 2025-02-25 NOTE — PROGRESS NOTES
[facility] [place]  Department of Neurological Surgery  New Patient Visit    History of Present Illness:  Ton Almaraz is a 64 y.o. year old male who presents to the spine clinic with numbness and tingling and pins-and-needles sensation bilaterally along the medial surface of his feet and extending to the plantar surface.  At times this will extend to his fall foot and he feels the symptoms in his right foot slightly more significant than the left.  Initially started experiencing this to a lesser degree about 18 months prior with workup including cardiologist, podiatrist, orthopedic spine surgeon, and neurologist before referral to neurosurgery spine specialty.  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  Workup has included x-rays of his lumbar spine and cervical spine as well as recent MRI of his lumbar spine.  Lumbar MRI is significant for central canal stenosis L3-4 and L4-5 along with bilateral foraminal stenosis at L4-5 leading to spine neurosurgery referral.  He comes to me after being scheduled by central scheduling.  In discussion with patient he denies low back pain or radiating leg pain from his back, numbness or tingling in his thighs or shins/calves, gait disturbance, leg heaviness with ambulation distance, balance disturbance, or bowel or bladder incontinence.  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  Upper extremity fine motor coordination and strength without any deficit or new changes.  EMG testing was obtained showing possibility of idiopathic peripheral neuropathy in combination with lumbar radiculopathy.  Further lab work has been ordered and pending.    Prior Spine Surgeries: none    Physical  Therapy: prior July/August 2024  Diabetic: no with last A1C 4.5   Osteoporosis: no  Patient's BMI is Body mass index is 31.87 kg/m².    14/14 systems reviewed and negative other than what is listed in the history of present illness    Patient Active Problem List   Diagnosis    Bilateral carotid artery stenosis    CAD S/P percutaneous coronary angioplasty    Chest pain    Essential hypertension    History of non-ST elevation myocardial infarction (NSTEMI)    Hypokalemia    Lower extremity edema    Mixed hyperlipidemia    PAD (peripheral artery disease) (CMS-HCC)    Persistent atrial fibrillation (Multi)    Polymyalgia rheumatica (Multi)    TIA (transient ischemic attack)    Class 1 obesity due to excess calories with body mass index (BMI) of 30.0 to 30.9 in adult    Class 1 obesity due to excess calories with body mass index (BMI) of 31.0 to 31.9 in adult    History of CVA (cerebrovascular accident)    High risk medication use    Never smoked any substance    On dofetilide therapy    Presence of Watchman left atrial appendage closure device    Rheumatoid arthritis    Family history of ischemic heart disease    Encounter for medication review and counseling    Encounter to discuss treatment options    BMI 32.0-32.9,adult    Benign prostatic hyperplasia with lower urinary tract symptoms    Other constipation    Thrombocytopenia (CMS-HCC)    Fatty liver    Renal cyst     Past Medical History:   Diagnosis Date    Acute serous otitis media, left ear 05/19/2017    Acute serous otitis media, left ear    Body mass index (BMI) 33.0-33.9, adult 03/15/2022    BMI 33.0-33.9,adult    Encounter for preprocedural cardiovascular examination 02/22/2022    Preop cardiovascular exam    Other chest pain 11/04/2021    Chest pain, non-cardiac    Other specified counseling 03/15/2022    Encounter for medication counseling    Other specified symptoms and signs involving the circulatory and respiratory systems 11/04/2021    Bruit of right  carotid artery    Other specified symptoms and signs involving the circulatory and respiratory systems 11/04/2021    Decreased pedal pulses    Pain in leg, unspecified 11/04/2021    Leg pain    Person consulting for explanation of examination or test findings 03/15/2022    Encounter to discuss test results    Persons encountering health services in other specified circumstances 02/22/2022    Encounter to establish care with new doctor     Past Surgical History:   Procedure Laterality Date    OTHER SURGICAL HISTORY  11/04/2021    Appendectomy    OTHER SURGICAL HISTORY  11/04/2021    Colonoscopy    OTHER SURGICAL HISTORY  11/04/2021    Cardioversion    OTHER SURGICAL HISTORY  11/04/2021    Knee replacement    OTHER SURGICAL HISTORY  11/17/2021    Cardiac catheterization with stent placement     Social History     Tobacco Use    Smoking status: Never    Smokeless tobacco: Never   Substance Use Topics    Alcohol use: Not Currently     Comment: very rarely     family history includes Coronary artery disease in an other family member; Lung cancer in his brother; Prostate cancer in his father; Scleroderma in his mother; scleroderma involving lung in his mother.    Current Outpatient Medications:     ammonium lactate (Lac-Hydrin) 12 % lotion, Apply topically if needed for dry skin., Disp: 396 g, Rfl: 3    aspirin 81 mg EC tablet, Take 1 tablet (81 mg) by mouth once daily., Disp: , Rfl:     b complex 0.4 mg tablet, Take 1 tablet by mouth once daily., Disp: , Rfl:     digoxin (Lanoxin) 125 MCG tablet, Take 1 tablet (125 mcg) by mouth once daily., Disp: 30 tablet, Rfl: 11    dofetilide (Tikosyn) 250 mcg capsule, Take 2 capsules in the am and one in the pm, Disp: 270 capsule, Rfl: 3    DULoxetine (Cymbalta) 30 mg DR capsule, Take 1 capsule (30 mg) by mouth once daily. Do not crush or chew., Disp: 30 capsule, Rfl: 2    fluticasone (Flonase) 50 mcg/actuation nasal spray, Administer 2 sprays into each nostril once daily., Disp: ,  Rfl:     hydrOXYzine pamoate (Vistaril) 25 mg capsule, Take 1 capsule (25 mg) by mouth if needed., Disp: , Rfl:     vjevcnduj-[id number]-algal oil 3 mg-35 mg-2 mg -90.314 mg capsule, Take 1 capsule by mouth early in the morning.., Disp: , Rfl:     losartan (Cozaar) 100 mg tablet, Take 1 tablet (100 mg) by mouth once daily., Disp: 90 tablet, Rfl: 3    magnesium oxide (Mag-Ox) 400 mg (241.3 mg magnesium) tablet, Take 1 tablet (400 mg) by mouth every 12 hours., Disp: , Rfl:     nitroglycerin (Nitrostat) 0.4 mg SL tablet, Place 1 tablet (0.4 mg) under the tongue if needed for chest pain., Disp: 25 tablet, Rfl: 5    rosuvastatin (Crestor) 20 mg tablet, Take 1 tablet (20 mg) by mouth once daily., Disp: 30 tablet, Rfl: 10    tamsulosin (Flomax) 0.4 mg 24 hr capsule, Take 1 capsule (0.4 mg) by mouth once daily., Disp: 30 capsule, Rfl: 11  Allergies   Allergen Reactions    Diltiazem Unknown    Metoprolol Succinate Unknown    Oxycodone Unknown       Physical Examination    General: Well developed, awake/alert/oriented x3, no distress, alert and cooperative  Skin: Warm and dry, no lesions, no rashes  ENMT: Mucous membranes moist, no apparent injury, no lesions seen  Head/Neck: Neck Supple, no apparent injury  Respiratory/Thorax: Normal breath sounds with good chest expansion, thorax symmetric  Cardiovascular: No pitting edema, no JVD    Motor Strength: 5/5 Throughout all extremities    Muscle Bulk: Normal and symmetric in all extremities    Posture:   -- Cervical: Normal  -- Thoracic: Normal  -- Lumbar : Normal  Paraspinal muscle spasm/tenderness absent.   Midline tenderness absent    Sensation: Mild deficit to sensation right lateral foot, plantar surface bilateral feet with hypersensitivity, otherwise intact to light touch  Dorothea negative  No clonus or other hyperreflexia    Results    I personally reviewed and interpreted the imaging results which included MRI lumbar spine with severe canal stenosis L2-3, 3-4, bilateral  foraminal stenosis L3-4, 4-5.  X-ray cervical spine with minimal possible anterior listhesis at C6/7    Assessment and Plan:  Ton Almaraz is a 64 y.o. year old male who presents to the spine clinic with numbness and tingling and pins-and-needles sensation bilaterally along the medial surface of his feet and extending to the plantar surface.  At times this will extend to his fall foot and he feels the symptoms in his right foot slightly more significant than the left.  Initially started experiencing this to a lesser degree about 18 months prior with workup including cardiologist, podiatrist, orthopedic spine surgeon, and neurologist before referral to neurosurgery spine specialty.  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  Workup has included x-rays of his lumbar spine and cervical spine as well as recent MRI of his lumbar spine.  Lumbar MRI is significant for central canal stenosis L3-4 and L4-5 along with bilateral foraminal stenosis at L4-5 leading to spine neurosurgery referral.  He comes to me after being scheduled by central scheduling.  In discussion with patient he denies low back pain or radiating leg pain from his back, numbness or tingling in his thighs or shins/calves, gait disturbance, leg heaviness with ambulation distance, balance disturbance, or bowel or bladder incontinence.  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  Upper extremity fine motor coordination and strength without any deficit or new changes.  EMG testing was obtained showing possibility of idiopathic peripheral neuropathy in combination with lumbar radiculopathy.  Further lab work has been ordered and pending.    Patient denies typical questioning for  cervical myelopathy or lumbar stenosis with neurogenic claudication.  MRI is significant for central canal stenosis and have advised him to seek urgent medical care if bowel or bladder incontinence, foot drop, gait disturbance presents.  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  Mechanical impact to peripheral nerves possibility.  May also consider tarsal tunnel as a more distal source of his symptoms.  Will provide referral to physical medicine rehab to discuss local treatment options and to revisit lumbar spine if these are not successful.  Will also reach out to neurologist, Dr. Pearson, to discuss as well.      I have reviewed all prior documentation and reviewed the electronic medical record since admission. I have personally have reviewed all advanced imaging not just the reports and used my interpretation as documented as the relevant findings. I have reviewed the risks and benefits of all treatment recommendations listed in this note with the patient and family.       The above clinical summary has been dictated with voice recognition software. It has not been proofread for grammatical errors, typographical mistakes, or other semantic inconsistencies.    Thank you for visiting our office today. It was our pleasure to take part in your healthcare.     Do not hesitate to call with any questions regarding your plan of care after leaving at [phone] M-F 8am-4pm.     To clinicians, thank you very much for this kind referral. It is a privilege to partner with you in the care of your patients. My office would be delighted to assist you with any further consultations or with questions regarding the plan of care outlined. Do not hesitate to call the office or contact me directly.       Sincerely,      Kwabena Lilly, ELANA, PA-C  Associate Physician Assistant,  Neurosurgery  Clinical   [facility] [employer]    [facility]  [address] [facility]  [address] [place], [place] [address]    Phone: [phone]  Fax: [phone]

## 2025-03-07 DIAGNOSIS — I48.19 PERSISTENT ATRIAL FIBRILLATION (MULTI): ICD-10-CM

## 2025-03-07 RX ORDER — DOFETILIDE 0.25 MG/1
CAPSULE ORAL
Qty: 270 CAPSULE | Refills: 3 | Status: SHIPPED | OUTPATIENT
Start: 2025-03-07

## 2025-03-07 NOTE — TELEPHONE ENCOUNTER
Received request for prescription refills for patient.   Patient follows with Dr. Franco and Dr. Sanz     Request is for Dofetilide  Is patient currently on medication yes    Last OV 1/3/25  Next OV 5/2/25    Pended for signing and sent to provider

## 2025-03-12 ENCOUNTER — LAB (OUTPATIENT)
Dept: LAB | Facility: HOSPITAL | Age: 65
End: 2025-03-12
Payer: COMMERCIAL

## 2025-03-12 LAB — PROT SERPL-MCNC: 6.4 G/DL (ref 6.4–8.2)

## 2025-03-12 PROCEDURE — 86334 IMMUNOFIX E-PHORESIS SERUM: CPT

## 2025-03-12 PROCEDURE — 84165 PROTEIN E-PHORESIS SERUM: CPT

## 2025-03-12 PROCEDURE — 84155 ASSAY OF PROTEIN SERUM: CPT

## 2025-03-12 PROCEDURE — 83521 IG LIGHT CHAINS FREE EACH: CPT

## 2025-03-13 LAB
BASOPHILS # BLD AUTO: 12 CELLS/UL (ref 0–200)
BASOPHILS NFR BLD AUTO: 0.3 %
EOSINOPHIL # BLD AUTO: 20 CELLS/UL (ref 15–500)
EOSINOPHIL NFR BLD AUTO: 0.5 %
ERYTHROCYTE [DISTWIDTH] IN BLOOD BY AUTOMATED COUNT: 13.7 % (ref 11–15)
EST. AVERAGE GLUCOSE BLD GHB EST-MCNC: 105 MG/DL
EST. AVERAGE GLUCOSE BLD GHB EST-SCNC: 5.8 MMOL/L
HBA1C MFR BLD: 5.3 % OF TOTAL HGB
HCT VFR BLD AUTO: 42.7 % (ref 38.5–50)
HGB BLD-MCNC: 14.4 G/DL (ref 13.2–17.1)
KAPPA LC SERPL-MCNC: 1.29 MG/DL (ref 0.33–1.94)
KAPPA LC/LAMBDA SER: 0.86 {RATIO} (ref 0.26–1.65)
LAMBDA LC SERPL-MCNC: 1.5 MG/DL (ref 0.57–2.63)
LYMPHOCYTES # BLD AUTO: 1030 CELLS/UL (ref 850–3900)
LYMPHOCYTES NFR BLD AUTO: 26.4 %
MCH RBC QN AUTO: 29.6 PG (ref 27–33)
MCHC RBC AUTO-ENTMCNC: 33.7 G/DL (ref 32–36)
MCV RBC AUTO: 87.7 FL (ref 80–100)
MONOCYTES # BLD AUTO: 803 CELLS/UL (ref 200–950)
MONOCYTES NFR BLD AUTO: 20.6 %
NEUTROPHILS # BLD AUTO: 2036 CELLS/UL (ref 1500–7800)
NEUTROPHILS NFR BLD AUTO: 52.2 %
PLATELET # BLD AUTO: 43 THOUSAND/UL (ref 140–400)
PMV BLD REES-ECKER: ABNORMAL FL
RBC # BLD AUTO: 4.87 MILLION/UL (ref 4.2–5.8)
SERVICE CMNT-IMP: ABNORMAL
WBC # BLD AUTO: 3.9 THOUSAND/UL (ref 3.8–10.8)

## 2025-03-14 LAB
ENA SS-A AB SER IA-ACNC: NORMAL AI
ENA SS-B AB SER IA-ACNC: NORMAL AI

## 2025-03-15 LAB
A-TOCOPHEROL VIT E SERPL-MCNC: 11.4 MG/L (ref 5.7–19.9)
BETA+GAMMA TOCOPHEROL SERPL-MCNC: 1.1 MG/L
CRP SERPL-MCNC: 3.5 MG/L
GLUCOSE P FAST SERPL-MCNC: 89 MG/DL (ref 65–99)
GLUCOSE SP1 P CHAL SERPL-MCNC: 179 MG/DL
GLUCOSE SP2 P CHAL SERPL-MCNC: 124 MG/DL
GLUCOSE SP3 P CHAL SERPL-MCNC: 77 MG/DL
PYRIDOXAL PHOS SERPL-MCNC: NORMAL UG/L
SERVICE CMNT-IMP: NORMAL
SPECIMEN DRAWN SERPL: 1003
SPECIMEN DRAWN SERPL: 700
SPECIMEN DRAWN SERPL: 803
SPECIMEN DRAWN SERPL: 903
TSH SERPL-ACNC: 1.69 MIU/L (ref 0.4–4.5)
VIT B1 BLD-SCNC: NORMAL NMOL/L

## 2025-03-16 LAB
ANA SER QL IF: NEGATIVE
ANCA AB SER QL: NEGATIVE

## 2025-03-17 ENCOUNTER — APPOINTMENT (OUTPATIENT)
Dept: NEUROLOGY | Facility: CLINIC | Age: 65
End: 2025-03-17
Payer: COMMERCIAL

## 2025-03-17 ENCOUNTER — ANCILLARY PROCEDURE (OUTPATIENT)
Facility: HOSPITAL | Age: 65
End: 2025-03-17
Payer: COMMERCIAL

## 2025-03-17 ENCOUNTER — OFFICE VISIT (OUTPATIENT)
Dept: UROLOGY | Facility: CLINIC | Age: 65
End: 2025-03-17
Payer: COMMERCIAL

## 2025-03-17 ENCOUNTER — OFFICE VISIT (OUTPATIENT)
Dept: NEUROLOGY | Facility: CLINIC | Age: 65
End: 2025-03-17
Payer: COMMERCIAL

## 2025-03-17 ENCOUNTER — PATIENT MESSAGE (OUTPATIENT)
Dept: NEUROLOGY | Facility: CLINIC | Age: 65
End: 2025-03-17

## 2025-03-17 ENCOUNTER — APPOINTMENT (OUTPATIENT)
Dept: PRIMARY CARE | Facility: CLINIC | Age: 65
End: 2025-03-17
Payer: COMMERCIAL

## 2025-03-17 VITALS
SYSTOLIC BLOOD PRESSURE: 138 MMHG | WEIGHT: 245.59 LBS | HEIGHT: 74 IN | BODY MASS INDEX: 31.52 KG/M2 | HEART RATE: 80 BPM | DIASTOLIC BLOOD PRESSURE: 91 MMHG

## 2025-03-17 VITALS
DIASTOLIC BLOOD PRESSURE: 92 MMHG | HEIGHT: 74 IN | WEIGHT: 247 LBS | SYSTOLIC BLOOD PRESSURE: 138 MMHG | BODY MASS INDEX: 31.7 KG/M2 | HEART RATE: 75 BPM

## 2025-03-17 DIAGNOSIS — G57.53 TARSAL TUNNEL SYNDROME OF BOTH LOWER EXTREMITIES: ICD-10-CM

## 2025-03-17 DIAGNOSIS — M54.16 LUMBAR RADICULOPATHY: ICD-10-CM

## 2025-03-17 DIAGNOSIS — R35.1 BENIGN PROSTATIC HYPERPLASIA WITH NOCTURIA: ICD-10-CM

## 2025-03-17 DIAGNOSIS — G62.9 NEUROPATHY: Primary | ICD-10-CM

## 2025-03-17 DIAGNOSIS — K76.0 HEPATIC STEATOSIS: ICD-10-CM

## 2025-03-17 DIAGNOSIS — R77.8 ABNORMAL SPEP: ICD-10-CM

## 2025-03-17 DIAGNOSIS — N40.1 BENIGN PROSTATIC HYPERPLASIA WITH NOCTURIA: ICD-10-CM

## 2025-03-17 DIAGNOSIS — N28.1 RENAL CYST: ICD-10-CM

## 2025-03-17 DIAGNOSIS — R35.0 URINARY FREQUENCY: Primary | ICD-10-CM

## 2025-03-17 LAB
ALBUMIN: 3.9 G/DL (ref 3.4–5)
ALPHA 1 GLOBULIN: 0.3 G/DL (ref 0.2–0.6)
ALPHA 2 GLOBULIN: 0.6 G/DL (ref 0.4–1.1)
BETA GLOBULIN: 0.7 G/DL (ref 0.5–1.2)
GAMMA GLOBULIN: 0.9 G/DL (ref 0.5–1.4)
IMMUNOFIXATION COMMENT: ABNORMAL
M-PROTEIN 1: 0.2 G/DL
PATH REVIEW - SERUM IMMUNOFIXATION: ABNORMAL
PATH REVIEW-SERUM PROTEIN ELECTROPHORESIS: ABNORMAL
PROTEIN ELECTROPHORESIS COMMENT: ABNORMAL

## 2025-03-17 PROCEDURE — 99417 PROLNG OP E/M EACH 15 MIN: CPT | Performed by: STUDENT IN AN ORGANIZED HEALTH CARE EDUCATION/TRAINING PROGRAM

## 2025-03-17 PROCEDURE — 74183 MRI ABD W/O CNTR FLWD CNTR: CPT

## 2025-03-17 PROCEDURE — A9575 INJ GADOTERATE MEGLUMI 0.1ML: HCPCS | Performed by: FAMILY MEDICINE

## 2025-03-17 PROCEDURE — 3008F BODY MASS INDEX DOCD: CPT | Performed by: STUDENT IN AN ORGANIZED HEALTH CARE EDUCATION/TRAINING PROGRAM

## 2025-03-17 PROCEDURE — 1036F TOBACCO NON-USER: CPT

## 2025-03-17 PROCEDURE — 99204 OFFICE O/P NEW MOD 45 MIN: CPT

## 2025-03-17 PROCEDURE — 2550000001 HC RX 255 CONTRASTS: Performed by: FAMILY MEDICINE

## 2025-03-17 PROCEDURE — 3075F SYST BP GE 130 - 139MM HG: CPT

## 2025-03-17 PROCEDURE — 3080F DIAST BP >= 90 MM HG: CPT | Performed by: STUDENT IN AN ORGANIZED HEALTH CARE EDUCATION/TRAINING PROGRAM

## 2025-03-17 PROCEDURE — 3075F SYST BP GE 130 - 139MM HG: CPT | Performed by: STUDENT IN AN ORGANIZED HEALTH CARE EDUCATION/TRAINING PROGRAM

## 2025-03-17 PROCEDURE — 99215 OFFICE O/P EST HI 40 MIN: CPT | Performed by: STUDENT IN AN ORGANIZED HEALTH CARE EDUCATION/TRAINING PROGRAM

## 2025-03-17 PROCEDURE — 3080F DIAST BP >= 90 MM HG: CPT

## 2025-03-17 PROCEDURE — 3008F BODY MASS INDEX DOCD: CPT

## 2025-03-17 RX ORDER — TAMSULOSIN HYDROCHLORIDE 0.4 MG/1
0.4 CAPSULE ORAL 2 TIMES DAILY
Qty: 60 CAPSULE | Refills: 2 | Status: SHIPPED | OUTPATIENT
Start: 2025-03-17 | End: 2025-06-15

## 2025-03-17 RX ORDER — GADOTERATE MEGLUMINE 376.9 MG/ML
20 INJECTION INTRAVENOUS
Status: COMPLETED | OUTPATIENT
Start: 2025-03-17 | End: 2025-03-17

## 2025-03-17 RX ADMIN — GADOTERATE MEGLUMINE 20 ML: 376.9 INJECTION INTRAVENOUS at 14:37

## 2025-03-17 NOTE — PATIENT INSTRUCTIONS
Please start taking 2 pills of duloxetine once a day. Please let me know how you are doing in about 3-4 weeks. If you are tolerating this, we can increase this to 3 tablets once a day.    I have ordered an ultrasound of your feet. If this does not show that the nerves are irritated, I will order an EMG of your left arm and leg.     Follow up in 3 months.

## 2025-03-17 NOTE — LETTER
March 22, 2025     Corey Bello DO  [address]  [address]  [facility] [address]    Patient: Ton Almaraz   YOB: 1960   Date of Visit: 3/17/2025       Dear Dr. Corey Bello DO:    Thank you for referring Ton Almaraz to me for evaluation. Below are my notes for this consultation.  If you have questions, please do not hesitate to call me. I look forward to following your patient along with you.       Sincerely,     Salome Pearson DO      CC: GUANAKO Tracey  ______________________________________________________________________________________       [facility]   Referring: Salome Pearson DO  PCP: GUANAKO Tracey  Date of service: 3/17/25    Chief Complaint   Patient presents with   • Numbness     Bilateral feet, follow up from MRI and labs.        HISTORY OF PRESENT ILLNESS     Subjective    Ton Almaraz is a 64 y.o. right handed male who presents for initial evaluation  of numbness/tingling. Past medical history is significant for hypertension, CAD, afib s/p Watchman, s/p R partial knee replacement and s/p bilateral CTS release (about 20 years ago). He presents with significant other Shannan who assists in providing history.   S/p CTS release about 20 years ago bilaterally.     For the last year Ton has been experiencing dysesthesias in his legs (burning/tingling > numbness).  It started in the first and second digits of both feet and have over time spread to include both soles, sometimes radiating up to both ankles.  He denies similar symptoms in his hands, only noticing that digit 2 on his left hand can become very cold requiring him to wear gloves.  He admits to associated weakness in the proximal legs only as well as chronic low back pain. He has autonomic symptoms of constipation, dry mouth, dry eyes and limited sweating. He denies muscle cramping, saddle anesthesia, or bowel/bladder involvement.     Prior workup included EMG of his  bilateral lower extremities in in May 2024 which was suggestive of both peripheral neuropathy and superimposed bilateral L5-S1 radiculopathy.  He has had an x-ray of his low back that demonstrated degenerative changes throughout without clear compression. He has never had a MRI of his lumbar spine.     For treatment of his symptoms he has tried a chiropractor and physical therapy which have not helped.  He has tried several over-the-counter topicals and Epsom salts which do not provide lengthy improvement of his symptoms.  He did not like gabapentin.  He has not tried any other oral medications.    Of note he is following hematology for leukopenia which they are monitoring.    Neuropathy risk factors:  - A1c 4.8 (2023)  - B12 - 1619  - TSH -1.69  - history of alcohol use?  no,   - history of toxin exposure?  none  - history of cancer and chemotherapy exposure? none  - family history of neuropathy? no    Shx: works as ; denies alcohol, tobacco or recreational drug use      Interval history:  Last visit 1/27/25. MRI Lspine showed severe spinal canal stenosis at L3-4 as well as severe right neural foraminal narrowing at L4-5. I referred him to neurosurgery who has ordered diagnostic injections with PM&R.   He is tolerating duloxetine without side effects which has helped reduce the stabbing pinprick sensations but his feet still continue to be very bothersome with numbness/tingling. He is also noticing that he will roll his right ankle more frequently than before. No new weakness in his lower extremities, saddle anesthesia or bowel/bladder symptoms. His left digit 2 will still become intermittently cold.     Patient Active Problem List   Diagnosis   • Bilateral carotid artery stenosis   • CAD S/P percutaneous coronary angioplasty   • Chest pain   • Essential hypertension   • History of non-ST elevation myocardial infarction (NSTEMI)   • Hypokalemia   • Lower extremity edema   • Mixed hyperlipidemia   • PAD  (peripheral artery disease) (CMS-[facility])   • Persistent atrial fibrillation (Multi)   • Polymyalgia rheumatica (Multi)   • TIA (transient ischemic attack)   • Class 1 obesity due to excess calories with body mass index (BMI) of 30.0 to 30.9 in adult   • Class 1 obesity due to excess calories with body mass index (BMI) of 31.0 to 31.9 in adult   • History of CVA (cerebrovascular accident)   • High risk medication use   • Never smoked any substance   • On dofetilide therapy   • Presence of Watchman left atrial appendage closure device   • Rheumatoid arthritis   • Family history of ischemic heart disease   • Encounter for medication review and counseling   • Encounter to discuss treatment options   • BMI 32.0-32.9,adult   • Benign prostatic hyperplasia with lower urinary tract symptoms   • Other constipation   • Thrombocytopenia (CMS-HCC)   • Hepatic steatosis   • Renal cyst     Past Medical History:   Diagnosis Date   • Acute serous otitis media, left ear 05/19/2017    Acute serous otitis media, left ear   • Body mass index (BMI) 33.0-33.9, adult 03/15/2022    BMI 33.0-33.9,adult   • Encounter for preprocedural cardiovascular examination 02/22/2022    Preop cardiovascular exam   • Other chest pain 11/04/2021    Chest pain, non-cardiac   • Other specified counseling 03/15/2022    Encounter for medication counseling   • Other specified symptoms and signs involving the circulatory and respiratory systems 11/04/2021    Bruit of right carotid artery   • Other specified symptoms and signs involving the circulatory and respiratory systems 11/04/2021    Decreased pedal pulses   • Pain in leg, unspecified 11/04/2021    Leg pain   • Person consulting for explanation of examination or test findings 03/15/2022    Encounter to discuss test results   • Persons encountering health services in other specified circumstances 02/22/2022    Encounter to establish care with new doctor     Past Surgical History:   Procedure Laterality Date    • OTHER SURGICAL HISTORY  11/04/2021    Appendectomy   • OTHER SURGICAL HISTORY  11/04/2021    Colonoscopy   • OTHER SURGICAL HISTORY  11/04/2021    Cardioversion   • OTHER SURGICAL HISTORY  11/04/2021    Knee replacement   • OTHER SURGICAL HISTORY  11/17/2021    Cardiac catheterization with stent placement     Social History     Tobacco Use   • Smoking status: Never   • Smokeless tobacco: Never   Substance Use Topics   • Alcohol use: Not Currently     Comment: very rarely     family history includes Coronary artery disease in an other family member; Lung cancer in his brother; Prostate cancer in his father; Scleroderma in his mother; scleroderma involving lung in his mother.    Current Outpatient Medications   Medication Instructions   • ammonium lactate (Lac-Hydrin) 12 % lotion Topical, As needed   • aspirin 81 mg EC tablet 1 tablet, Daily   • b complex 0.4 mg tablet 1 tablet, Daily   • digoxin (LANOXIN) 125 mcg, oral, Daily   • dofetilide (Tikosyn) 250 mcg capsule TAKE 2 CAPSULES BY MOUTH EVERY MORNING, TAKE 1 CAPSULE EVERY EVENING.   • DULoxetine (CYMBALTA) 30 mg, oral, Daily, Do not crush or chew.   • fluticasone (Flonase) 50 mcg/actuation nasal spray 2 sprays, Daily   • hydrOXYzine pamoate (VISTARIL) 25 mg, As needed   • tgwfutdle-[id number]-algal oil 3 mg-35 mg-2 mg -90.314 mg capsule 1 capsule, Daily   • losartan (COZAAR) 100 mg, oral, Daily   • magnesium oxide (Mag-Ox) 400 mg (241.3 mg magnesium) tablet 1 tablet, Every 12 hours scheduled (0630,1830)   • nitroglycerin (NITROSTAT) 0.4 mg, sublingual, As needed   • rosuvastatin (CRESTOR) 20 mg, oral, Daily   • tamsulosin (FLOMAX) 0.4 mg, oral, 2 times daily     Allergies   Allergen Reactions   • Diltiazem Unknown   • Metoprolol Succinate Unknown   • Oxycodone Unknown       PHYSICAL EXAM     Objective  Neurological Exam  Physical Exam    General Appearance:  No distress, alert, interactive and cooperative.   Skin: No rash present on limbs or extensor  surfaces    Neurological:  Mental status: the patient provided an accurate history, language was normal.   Cranial Nerves:  CN 2   Visual fields full to confrontation.   CN 3, 4, 6   Lids symmetric; no ptosis.   EOMs normal alignment, full range, with normal pursuit and convergence  No nystagmus.   CN 5   Facial sensation intact bilaterally.   CN 7   Normal and symmetric facial strength. Nasolabial folds symmetric.   Able to lift eyebrows and close eye lids with eye lashes buried symmetrically.   CN 8   Hearing intact to conversation.     CN 9, 10   Palate elevates symmetrically.   Phonation within normal limits, no dysarthria.   CN 11   Normal strength of shoulder shrug and neck turning.   CN 12   Tongue midline, with normal bulk and strength; no fasciculations.     Motor:   Muscle bulk: Normal throughout.  Muscle tone: Normal in both upper and lower extremities.  Movements: No fasciculations, tremors, or other abnormal movement.     Manual Muscle Testing (MMT) reveals the following MRC grades:    R L   Shoulder abduction  5 5  Elbow flexion   5 5  Elbow extension  5 5  Wrist extension  5 5  Wrist flexion   5 5  Finger extension  5 5  Finger flexion   5 5  Finger abduction  5 5  Thumb flexion   5 5  Thumb abduction   5 5    Hip flexion   5 5  Hip abduction    5 5  Hip adduction    5 5  Knee flexion   5 5  Knee extension  5 5  Ankle dorsiflexion  5 5  Ankle plantarflexion  5 5  Ankle Inversion   5- 5-  Ankle Eversion   5 5  Big toe extension  5 5  Toe flexion   5- 5-    Reflexes:     R          L  BR:               2          2  Biceps:         2          2  Triceps:        2          2  Knee:           1          2  Ankle:          1          1    Babinski: Toes are down going  Caicedo's: Not present  No clonus      Sensory:   In both upper and lower extremities, sensation was intact to light touch  Pinprick: bilateral feet decreased pinprick on both soles and to midfoot on dorsum (worse on right)  Proprioception:  intact in BLE  Vibration: mild decrease at bilateral big toes    Coordination:    In both upper extremities, finger-nose-finger was intact without dysmetria or overshoot.   In both lower extremities, heel-to-shin was intact  CHUYITA were intact in both upper and lower extremities.      Gait:   Station was stable with a normal base. Gait was stable with a normal arm swing and speed. No ataxia, shuffling, steppage or waddling was present. No circumduction was present.   Can walk on toes. Can heel walk.   Tandem gait was intact.   No Romberg sign was present.      RESULTS   Data reviewed:  - MRI [facility] 2/13/25  IMPRESSION:  At L3-L4 there is severe facet arthropathy with ligamentum flavum thickening, trace spondylolisthesis, and mild lumbar spondylosis contributing to severe spinal canal stenosis and lateral recess narrowing. Severe left neural foraminal narrowing with compression of  the exiting left L3 nerve root.  There is severe right neural foraminal narrowing at L4-L5 with compression of the exiting right L4 nerve root.  Moderate spinal canal stenosis at L2-L3.    - Xray 6/15/24  IMPRESSION:  Moderate lumbar degenerative changes, greatest L4-S1 similar to the previous study.  Minimal anterolisthesis L3-4 from facet arthrosis with no pathologic motion.    - EMG 5/21/24  Peripheral neuropathy idiopathic.  Patient could be screened for causes of peripheral neuropathy such as diabetes mellitus, hypothyroidism, exposure to toxins, autoimmune disorder etc.   Simple Rinne imposed bilateral L5-S1 radiculopathy with mild involvement of the right L4 root   Patient being treated  conservatively   Patient had imaging of the lumbar spine done.   If clinically indicated we could repeat the study in a year       Lab Results   Component Value Date    HGBA1C 5.3 03/12/2025    INPPLFYT27 373 10/11/2019    VITAMINB6 40.3 (H) 03/12/2025    VITEALPHA 11.4 03/12/2025    VITEGAMMA 1.1 03/12/2025    SPEP Aberrant band detected. See  immunofixation. 03/12/2025    TSH 1.69 03/12/2025    TSH 1.39 02/13/2025     Lab Results   Component Value Date    CKTOTAL 128 09/11/2019     Lab Results   Component Value Date    SPEP Aberrant band detected. See immunofixation. 03/12/2025     Serum 3/2025: kappa/lambda FLC wnl, CRP 3.5, B1 121, B6 40.3, vit E wnl, ANCA neg, JASMEET neg, SSA neg, SSB neg      IMPRESSION AND PLAN   Ton Almaraz is a 65 yo nondiabetic male who presents with 1 year history of gradually progressive dysesthesia in BLE. EMG from 5/2024 was suggestive of mild axonal peripheral neuropathy with superimposed bilateral lower lumbosacral radiculopathies. Recent evaluation with neurosurgery raised question of possible tarsal tunnel syndrome as contributing pathology.  Suspected peripheral neuropathy, axonal. Likely very mild involvement based on today's exam findings as well as data from EMG on 5/2024. 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 He will follow up with hematology for further evaluation   Suspected bilateral lumbosacral radiculopathies. Straight leg test positive bilaterally on today's exam. No red flags in history. MRI Lspine on 2/14/25 demonstrated severe spinal canal stenosis with severe neural foraminal narrowing at left L3 and right L4. Evaluation by neurosurgery recommended diagnostic injections with PM&R, scheduled for next month.   Regarding question of possible tarsal tunnel syndrome, this is often difficult to diagnose definitively on EMG. Will obtain neuromuscular ultrasound of tibial nerves for further clarification.    Neuropathic pain (tingling/burning > numbness). He does not like gabapentin. He has tolerated low dose duloxetine without side effects. Will increase this to 60mg qD.   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 Patient will follow up with his hematologist Dr. Bello regarding this.     Plan:  - increase duloxetine to 60mg qHS  - neuromuscular ultrasound of tibial nerves  at ankles. If unremarkable, will consider repeat EMG to look for progression of PPN, evaluate symptoms in UE  - patient to follow up with his hematologist Dr. Bello regarding M-spike on SPEP  - patient understands and agrees to plan  - RTC 3 months      Diagnoses and all orders for this visit:  Neuropathy  -     Referral To Hematology and Oncology; Future  Lumbar radiculopathy  -     Follow Up In Neurology  -     Follow Up In Neurology; Future  Tarsal tunnel syndrome of both lower extremities  -     Point of Care Neuromuscular US; Future  Abnormal SPEP        Patient Instructions   Please start taking 2 pills of duloxetine once a day. Please let me know how you are doing in about 3-4 weeks. If you are tolerating this, we can increase this to 3 tablets once a day.    I have ordered an ultrasound of your feet. If this does not show that the nerves are irritated, I will order an EMG of your left arm and leg.     Follow up in 3 months.        Salome Pearson, DO        I personally spent 60 minutes on the day of the visit completing the review of the medical record and outside records, obtaining history and performing an appropriate physical exam, patient care, counseling and education, placing orders, independently reviewing results, communicating with the patient/family and other providers, coordinating care and performing appropriate clinical documentation.

## 2025-03-17 NOTE — PROGRESS NOTES
Subjective   Patient ID: Ton Almaraz is a 64 y.o. male.    Patient presents with a kidney cyst referred by his PCP.  Cyst was found during imaging of his liver, ultrasoud showed a 7.2 cm left renal cyst.  No cysts on the right.  Per chart review, he had a noncontrast CT in  and the cyst was measured at 7.5 cm.  Creatinine 1.08.  Had MRI of liver today.  Has appt with GI.    Patient is bothered by urinary frequency.  Daytime frequency q1-2hrs.  Nocturia 2-3x.  Admits to urgency.  No incontinence.  No straining/bearing down to initiate stream.  No hesitancy.  Fair stream.  No flank pain.  No gross hematuria.  No dysuria.  No hx of kidney stones.  FMH of enlarged prostate and prostate ca.  His father  from a blood clot.   PSA 1.2.  He started Flomax a month ago but has not noticed any changes.  He couldn't void today, was fasting for liver MRI.      Review of Systems   All other systems reviewed and are negative.    Objective   Physical Exam  Vitals reviewed.       Vitals:    25 1507   BP: (!) 138/91   Pulse: 80     Alert and oriented x3  No acute distress, cooperative  Breathes easily on room air  Normal range of motion  No focal neurological deficits  Appears stated age    Lab Results   Component Value Date    GLUCOSE 107 (H) 2025    CALCIUM 9.0 2025     2025    K 3.6 2025    CO2 30 2025     2025    BUN 14 2025    CREATININE 1.08 2025     Lab Results   Component Value Date    PSA 1.20 2025     === 25 ===    US LIVER WITH DOPPLER    - Impression -  1. Hepatic steatosis with a well-defined area measuring 4.2 cm  adjacent to gallbladder. Finding could represent fat spared region,  however given rounded morphology advise further assessment by  dedicated liver MRI with IV contrast..  2. Patent hepatic vasculature.  3. No splenomegaly.  4. Dominant left renal cyst measuring 7.2 cm.    MACRO:  None    Signed by: Surya Vega  2/16/2025 9:36 AM  Dictation workstation:   [id number]    Assessment/Plan   Diagnoses and all orders for this visit:  Urinary frequency  -     Follow Up In Urology; Future  Renal cyst  -     Referral to Urology  Benign prostatic hyperplasia with nocturia  -     tamsulosin (Flomax) 0.4 mg 24 hr capsule; Take 1 capsule (0.4 mg) by mouth 2 times a day.  -     Follow Up In Urology; Future    Patient with left renal cyst which has remained stable in size since 2022.  Discussed that this is a simple cyst but will repeat a renal US in 1 year for surveillance.  He is bothered by urinary frequency, recently put on Flomax without much improvement.  Discussed increasing dosage vs OAB med, he wants to try increasing Flomax to BID first.  Discussed side effects including lightheadedness/dizziness.  I will see him back in 1-2 mo to check a UA/PVR and will consider an OAB med at that point if no improvement.  Will also order US at that point.  Patient agrees with plan and all questions answered.    Stacey Trejo PA-C 03/17/25 3:06 PM

## 2025-03-17 NOTE — PROGRESS NOTES
[facility]   Referring: Below, Salome BAEZ DO  PCP: GUANAKO Tracey  Date of service: 3/17/25    Chief Complaint   Patient presents with    Numbness     Bilateral feet, follow up from MRI and labs.        HISTORY OF PRESENT ILLNESS     Subjective     Ton Almaraz is a 64 y.o. right handed male who presents for initial evaluation  of numbness/tingling. Past medical history is significant for hypertension, CAD, afib s/p Watchman, s/p R partial knee replacement and s/p bilateral CTS release (about 20 years ago). He presents with significant other Shannan who assists in providing history.   S/p CTS release about 20 years ago bilaterally.     For the last year Ton has been experiencing dysesthesias in his legs (burning/tingling > numbness).  It started in the first and second digits of both feet and have over time spread to include both soles, sometimes radiating up to both ankles.  He denies similar symptoms in his hands, only noticing that digit 2 on his left hand can become very cold requiring him to wear gloves.  He admits to associated weakness in the proximal legs only as well as chronic low back pain. He has autonomic symptoms of constipation, dry mouth, dry eyes and limited sweating. He denies muscle cramping, saddle anesthesia, or bowel/bladder involvement.     Prior workup included EMG of his bilateral lower extremities in in May 2024 which was suggestive of both peripheral neuropathy and superimposed bilateral L5-S1 radiculopathy.  He has had an x-ray of his low back that demonstrated degenerative changes throughout without clear compression. He has never had a MRI of his lumbar spine.     For treatment of his symptoms he has tried a chiropractor and physical therapy which have not helped.  He has tried several over-the-counter topicals and Epsom salts which do not provide lengthy improvement of his symptoms.  He did not like gabapentin.  He has not tried any other oral  medications.    Of note he is following hematology for leukopenia which they are monitoring.    Neuropathy risk factors:  - A1c 4.8 (2023)  - B12 - 1619  - TSH -1.69  - history of alcohol use?  no,   - history of toxin exposure?  none  - history of cancer and chemotherapy exposure? none  - family history of neuropathy? no    Shx: works as ; denies alcohol, tobacco or recreational drug use      Interval history:  Last visit 1/27/25. MRI Lspine showed severe spinal canal stenosis at L3-4 as well as severe right neural foraminal narrowing at L4-5. I referred him to neurosurgery who has ordered diagnostic injections with PM&R.   He is tolerating duloxetine without side effects which has helped reduce the stabbing pinprick sensations but his feet still continue to be very bothersome with numbness/tingling. He is also noticing that he will roll his right ankle more frequently than before. No new weakness in his lower extremities, saddle anesthesia or bowel/bladder symptoms. His left digit 2 will still become intermittently cold.     Patient Active Problem List   Diagnosis    Bilateral carotid artery stenosis    CAD S/P percutaneous coronary angioplasty    Chest pain    Essential hypertension    History of non-ST elevation myocardial infarction (NSTEMI)    Hypokalemia    Lower extremity edema    Mixed hyperlipidemia    PAD (peripheral artery disease) (CMS-[facility])    Persistent atrial fibrillation (Multi)    Polymyalgia rheumatica (Multi)    TIA (transient ischemic attack)    Class 1 obesity due to excess calories with body mass index (BMI) of 30.0 to 30.9 in adult    Class 1 obesity due to excess calories with body mass index (BMI) of 31.0 to 31.9 in adult    History of CVA (cerebrovascular accident)    High risk medication use    Never smoked any substance    On dofetilide therapy    Presence of Watchman left atrial appendage closure device    Rheumatoid arthritis    Family history of ischemic heart disease     Encounter for medication review and counseling    Encounter to discuss treatment options    BMI 32.0-32.9,adult    Benign prostatic hyperplasia with lower urinary tract symptoms    Other constipation    Thrombocytopenia (CMS-HCC)    Hepatic steatosis    Renal cyst     Past Medical History:   Diagnosis Date    Acute serous otitis media, left ear 05/19/2017    Acute serous otitis media, left ear    Body mass index (BMI) 33.0-33.9, adult 03/15/2022    BMI 33.0-33.9,adult    Encounter for preprocedural cardiovascular examination 02/22/2022    Preop cardiovascular exam    Other chest pain 11/04/2021    Chest pain, non-cardiac    Other specified counseling 03/15/2022    Encounter for medication counseling    Other specified symptoms and signs involving the circulatory and respiratory systems 11/04/2021    Bruit of right carotid artery    Other specified symptoms and signs involving the circulatory and respiratory systems 11/04/2021    Decreased pedal pulses    Pain in leg, unspecified 11/04/2021    Leg pain    Person consulting for explanation of examination or test findings 03/15/2022    Encounter to discuss test results    Persons encountering health services in other specified circumstances 02/22/2022    Encounter to establish care with new doctor     Past Surgical History:   Procedure Laterality Date    OTHER SURGICAL HISTORY  11/04/2021    Appendectomy    OTHER SURGICAL HISTORY  11/04/2021    Colonoscopy    OTHER SURGICAL HISTORY  11/04/2021    Cardioversion    OTHER SURGICAL HISTORY  11/04/2021    Knee replacement    OTHER SURGICAL HISTORY  11/17/2021    Cardiac catheterization with stent placement     Social History     Tobacco Use    Smoking status: Never    Smokeless tobacco: Never   Substance Use Topics    Alcohol use: Not Currently     Comment: very rarely     family history includes Coronary artery disease in an other family member; Lung cancer in his brother; Prostate cancer in his father; Scleroderma in his  mother; scleroderma involving lung in his mother.    Current Outpatient Medications   Medication Instructions    ammonium lactate (Lac-Hydrin) 12 % lotion Topical, As needed    aspirin 81 mg EC tablet 1 tablet, Daily    b complex 0.4 mg tablet 1 tablet, Daily    digoxin (LANOXIN) 125 mcg, oral, Daily    dofetilide (Tikosyn) 250 mcg capsule TAKE 2 CAPSULES BY MOUTH EVERY MORNING, TAKE 1 CAPSULE EVERY EVENING.    DULoxetine (CYMBALTA) 30 mg, oral, Daily, Do not crush or chew.    fluticasone (Flonase) 50 mcg/actuation nasal spray 2 sprays, Daily    hydrOXYzine pamoate (VISTARIL) 25 mg, As needed    [id number]-algal oil 3 mg-35 mg-2 mg -90.314 mg capsule 1 capsule, Daily    losartan (COZAAR) 100 mg, oral, Daily    magnesium oxide (Mag-Ox) 400 mg (241.3 mg magnesium) tablet 1 tablet, Every 12 hours scheduled (0630,1830)    nitroglycerin (NITROSTAT) 0.4 mg, sublingual, As needed    rosuvastatin (CRESTOR) 20 mg, oral, Daily    tamsulosin (FLOMAX) 0.4 mg, oral, 2 times daily     Allergies   Allergen Reactions    Diltiazem Unknown    Metoprolol Succinate Unknown    Oxycodone Unknown       PHYSICAL EXAM     Objective   Neurological Exam  Physical Exam    General Appearance:  No distress, alert, interactive and cooperative.   Skin: No rash present on limbs or extensor surfaces    Neurological:  Mental status: the patient provided an accurate history, language was normal.   Cranial Nerves:  CN 2   Visual fields full to confrontation.   CN 3, 4, 6   Lids symmetric; no ptosis.   EOMs normal alignment, full range, with normal pursuit and convergence  No nystagmus.   CN 5   Facial sensation intact bilaterally.   CN 7   Normal and symmetric facial strength. Nasolabial folds symmetric.   Able to lift eyebrows and close eye lids with eye lashes buried symmetrically.   CN 8   Hearing intact to conversation.     CN 9, 10   Palate elevates symmetrically.   Phonation within normal limits, no dysarthria.   CN 11   Normal strength  of shoulder shrug and neck turning.   CN 12   Tongue midline, with normal bulk and strength; no fasciculations.     Motor:   Muscle bulk: Normal throughout.  Muscle tone: Normal in both upper and lower extremities.  Movements: No fasciculations, tremors, or other abnormal movement.     Manual Muscle Testing (MMT) reveals the following MRC grades:    R L   Shoulder abduction  5 5  Elbow flexion   5 5  Elbow extension  5 5  Wrist extension  5 5  Wrist flexion   5 5  Finger extension  5 5  Finger flexion   5 5  Finger abduction  5 5  Thumb flexion   5 5  Thumb abduction   5 5    Hip flexion   5 5  Hip abduction    5 5  Hip adduction    5 5  Knee flexion   5 5  Knee extension  5 5  Ankle dorsiflexion  5 5  Ankle plantarflexion  5 5  Ankle Inversion   5- 5-  Ankle Eversion   5 5  Big toe extension  5 5  Toe flexion   5- 5-    Reflexes:     R          L  BR:               2          2  Biceps:         2          2  Triceps:        2          2  Knee:           1          2  Ankle:          1          1    Babinski: Toes are down going  Caicedo's: Not present  No clonus      Sensory:   In both upper and lower extremities, sensation was intact to light touch  Pinprick: bilateral feet decreased pinprick on both soles and to midfoot on dorsum (worse on right)  Proprioception: intact in BLE  Vibration: mild decrease at bilateral big toes    Coordination:    In both upper extremities, finger-nose-finger was intact without dysmetria or overshoot.   In both lower extremities, heel-to-shin was intact  CHUYITA were intact in both upper and lower extremities.      Gait:   Station was stable with a normal base. Gait was stable with a normal arm swing and speed. No ataxia, shuffling, steppage or waddling was present. No circumduction was present.   Can walk on toes. Can heel walk.   Tandem gait was intact.   No Romberg sign was present.      RESULTS   Data reviewed:  - MRI [facility] 2/13/25  IMPRESSION:  At L3-L4 there is severe facet  arthropathy with ligamentum flavum thickening, trace spondylolisthesis, and mild lumbar spondylosis contributing to severe spinal canal stenosis and lateral recess narrowing. Severe left neural foraminal narrowing with compression of  the exiting left L3 nerve root.  There is severe right neural foraminal narrowing at L4-L5 with compression of the exiting right L4 nerve root.  Moderate spinal canal stenosis at L2-L3.    - Xray 6/15/24  IMPRESSION:  Moderate lumbar degenerative changes, greatest L4-S1 similar to the previous study.  Minimal anterolisthesis L3-4 from facet arthrosis with no pathologic motion.    - EMG 5/21/24  Peripheral neuropathy idiopathic.  Patient could be screened for causes of peripheral neuropathy such as diabetes mellitus, hypothyroidism, exposure to toxins, autoimmune disorder etc.   Simple Rinne imposed bilateral L5-S1 radiculopathy with mild involvement of the right L4 root   Patient being treated  conservatively   Patient had imaging of the lumbar spine done.   If clinically indicated we could repeat the study in a year       Lab Results   Component Value Date    HGBA1C 5.3 03/12/2025    VVMURKHF43 373 10/11/2019    VITAMINB6 40.3 (H) 03/12/2025    VITEALPHA 11.4 03/12/2025    VITEGAMMA 1.1 03/12/2025    SPEP Aberrant band detected. See immunofixation. 03/12/2025    TSH 1.69 03/12/2025    TSH 1.39 02/13/2025     Lab Results   Component Value Date    CKTOTAL 128 09/11/2019     Lab Results   Component Value Date    SPEP Aberrant band detected. See immunofixation. 03/12/2025     Serum 3/2025: kappa/lambda FLC wnl, CRP 3.5, B1 121, B6 40.3, vit E wnl, ANCA neg, JASMEET neg, SSA neg, SSB neg      IMPRESSION AND PLAN   Ton Almaraz is a 65 yo nondiabetic male who presents with 1 year history of gradually progressive dysesthesia in BLE. EMG from 5/2024 was suggestive of mild axonal peripheral neuropathy with superimposed bilateral lower lumbosacral radiculopathies. Recent evaluation with neurosurgery  raised question of possible tarsal tunnel syndrome as contributing pathology.  Suspected peripheral neuropathy, axonal. Likely very mild involvement based on today's exam findings as well as data from EMG on 5/2024. 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 He will follow up with hematology for further evaluation   Suspected bilateral lumbosacral radiculopathies. Straight leg test positive bilaterally on today's exam. No red flags in history. MRI Lspine on 2/14/25 demonstrated severe spinal canal stenosis with severe neural foraminal narrowing at left L3 and right L4. Evaluation by neurosurgery recommended diagnostic injections with PM&R, scheduled for next month.   Regarding question of possible tarsal tunnel syndrome, this is often difficult to diagnose definitively on EMG. Will obtain neuromuscular ultrasound of tibial nerves for further clarification.    Neuropathic pain (tingling/burning > numbness). He does not like gabapentin. He has tolerated low dose duloxetine without side effects. Will increase this to 60mg qD.   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 Patient will follow up with his hematologist Dr. Bello regarding this.     Plan:  - increase duloxetine to 60mg qHS  - neuromuscular ultrasound of tibial nerves at ankles. If unremarkable, will consider repeat EMG to look for progression of PPN, evaluate symptoms in UE  - patient to follow up with his hematologist Dr. Bello regarding M-spike on SPEP  - patient understands and agrees to plan  - RTC 3 months      Diagnoses and all orders for this visit:  Neuropathy  -     Referral To Hematology and Oncology; Future  Lumbar radiculopathy  -     Follow Up In Neurology  -     Follow Up In Neurology; Future  Tarsal tunnel syndrome of both lower extremities  -     Point of Care Neuromuscular US; Future  Abnormal SPEP        Patient Instructions   Please start taking 2 pills of duloxetine once a day. Please let me know  how you are doing in about 3-4 weeks. If you are tolerating this, we can increase this to 3 tablets once a day.    I have ordered an ultrasound of your feet. If this does not show that the nerves are irritated, I will order an EMG of your left arm and leg.     Follow up in 3 months.        Salome Pearson, DO        I personally spent 60 minutes on the day of the visit completing the review of the medical record and outside records, obtaining history and performing an appropriate physical exam, patient care, counseling and education, placing orders, independently reviewing results, communicating with the patient/family and other providers, coordinating care and performing appropriate clinical documentation.

## 2025-03-18 ENCOUNTER — APPOINTMENT (OUTPATIENT)
Dept: PRIMARY CARE | Facility: CLINIC | Age: 65
End: 2025-03-18
Payer: COMMERCIAL

## 2025-03-20 LAB
A-TOCOPHEROL VIT E SERPL-MCNC: 11.4 MG/L (ref 5.7–19.9)
BETA+GAMMA TOCOPHEROL SERPL-MCNC: 1.1 MG/L
CRP SERPL-MCNC: 3.5 MG/L
GLUCOSE P FAST SERPL-MCNC: 89 MG/DL (ref 65–99)
GLUCOSE SP1 P CHAL SERPL-MCNC: 179 MG/DL
GLUCOSE SP2 P CHAL SERPL-MCNC: 124 MG/DL
GLUCOSE SP3 P CHAL SERPL-MCNC: 77 MG/DL
PYRIDOXAL PHOS SERPL-MCNC: 40.3 NG/ML (ref 2.1–21.7)
SERVICE CMNT-IMP: NORMAL
SPECIMEN DRAWN SERPL: 1003
SPECIMEN DRAWN SERPL: 700
SPECIMEN DRAWN SERPL: 803
SPECIMEN DRAWN SERPL: 903
TSH SERPL-ACNC: 1.69 MIU/L (ref 0.4–4.5)
VIT B1 BLD-SCNC: 121 NMOL/L (ref 78–185)

## 2025-03-21 ENCOUNTER — OFFICE VISIT (OUTPATIENT)
Dept: GASTROENTEROLOGY | Facility: CLINIC | Age: 65
End: 2025-03-21
Payer: COMMERCIAL

## 2025-03-21 VITALS
DIASTOLIC BLOOD PRESSURE: 97 MMHG | SYSTOLIC BLOOD PRESSURE: 164 MMHG | HEIGHT: 74 IN | TEMPERATURE: 97.9 F | HEART RATE: 80 BPM | BODY MASS INDEX: 31.9 KG/M2 | WEIGHT: 248.6 LBS

## 2025-03-21 DIAGNOSIS — K76.0 HEPATIC STEATOSIS: ICD-10-CM

## 2025-03-21 PROCEDURE — 1036F TOBACCO NON-USER: CPT | Performed by: NURSE PRACTITIONER

## 2025-03-21 PROCEDURE — 3008F BODY MASS INDEX DOCD: CPT | Performed by: NURSE PRACTITIONER

## 2025-03-21 PROCEDURE — 99203 OFFICE O/P NEW LOW 30 MIN: CPT | Performed by: NURSE PRACTITIONER

## 2025-03-21 PROCEDURE — 99213 OFFICE O/P EST LOW 20 MIN: CPT | Performed by: NURSE PRACTITIONER

## 2025-03-21 PROCEDURE — 3077F SYST BP >= 140 MM HG: CPT | Performed by: NURSE PRACTITIONER

## 2025-03-21 PROCEDURE — 3080F DIAST BP >= 90 MM HG: CPT | Performed by: NURSE PRACTITIONER

## 2025-03-21 ASSESSMENT — ENCOUNTER SYMPTOMS
AGITATION: 0
DIFFICULTY URINATING: 0
TROUBLE SWALLOWING: 0
UNEXPECTED WEIGHT CHANGE: 0
JOINT SWELLING: 0
DIARRHEA: 0
CONSTIPATION: 0
WHEEZING: 0
TREMORS: 0
NAUSEA: 0
VOMITING: 0
SLEEP DISTURBANCE: 0
CHILLS: 0
BLOOD IN STOOL: 0
COLOR CHANGE: 0
NUMBNESS: 0
LIGHT-HEADEDNESS: 0
HEADACHES: 0
HEMATURIA: 0
SHORTNESS OF BREATH: 0
WEAKNESS: 0
COUGH: 0
PALPITATIONS: 0
VOICE CHANGE: 0
DIZZINESS: 0
ABDOMINAL PAIN: 0
ABDOMINAL DISTENTION: 0
CONFUSION: 0
BRUISES/BLEEDS EASILY: 0
FEVER: 0
DYSURIA: 0
FREQUENCY: 0
APPETITE CHANGE: 0

## 2025-03-21 ASSESSMENT — PAIN SCALES - GENERAL: PAINLEVEL_OUTOF10: 0-NO PAIN

## 2025-03-21 NOTE — ASSESSMENT & PLAN NOTE
64 year old with imaging showing hepatic steatosis with normal liver enzymes/function tests.  No evidence of cirrhosis.  Risk factors for fatty liver include HTN, CAD, HLD.   Asymptomatic    Discussed natural history of fatty liver including risk factors and management.  Exercise/Activity  Vigorous physical activity like brisk walking or structured gym exercises 3 times a week for 30 minutes at minimum.    Resistance training to build muscle mass which will aid in weight loss.  Swimming, water aerobics are excellent forms of exercises that are easy on joints.    Exercise for 30 minutes or more at least 3 times a week  Aim to lose 7-10% of your total body weight over 6-12 months  Diet  Avoid/Limit simple carbs including simple sugars  Avoid eating foods that are rich in sugar in excess such as high sugar content fruits (pineapple, ana...) and desserts, cakes and pies  Eat more good foods that are rich in good fat such as cold water fish (salmon, swordfish...) as well as avocados and peanut butter in moderation  Snack on nuts that are high in protein and good oils such as walnuts, almonds.   Eat a mediteranean diet which is rich in grilled lean meats, high protein beans and legumes and olive oil.          Will complete fibroscan to grade and stage fatty liver.  Based on these findings, will determine follow-up and treatment course, which could include new drug for ELLSWORTH.

## 2025-03-21 NOTE — PROGRESS NOTES
Patient ID: Ton Almaraz is a 64 y.o. male who presents for fatty liver.    HPI  64 year old with history of HLD, CAD, HTN and a-fib referred for fatty liver.    Pt has history of thrombocytopenia and underwent US of abdomen which showed increased echogenicity and area of focal fatty sparing.  No splenomegaly.  This led to MRI of the abdomen which confirms hepatic steatosis and focal fatty sparing along the gallbladder.  No evidence of cirrhosis or enhancing lesion.  Mild splenomegaly.     He saw a hematologist who did bone marrow bx which was essentially normal.    Most recent labs are normal outside of low plt count.   Liver enzymes/function tests are normal.    His brother has fatty liver.    No ETOH.  No risk factors for viral hepatitis.  He had full autoimmune work-up done which was normal.    Denies jaundice, icterus, itching, abdominal distension, edema, bleeding or confusion.    Denies fevers, chills, abdominal pain.       Review of Systems   Constitutional:  Negative for appetite change, chills, fever and unexpected weight change.   HENT:  Negative for mouth sores, nosebleeds, trouble swallowing and voice change.    Eyes:  Negative for visual disturbance.   Respiratory:  Negative for cough, shortness of breath and wheezing.    Cardiovascular:  Negative for chest pain, palpitations and leg swelling.   Gastrointestinal:  Negative for abdominal distention, abdominal pain, blood in stool, constipation, diarrhea, nausea and vomiting.   Genitourinary:  Negative for decreased urine volume, difficulty urinating, dysuria, frequency, hematuria and urgency.   Musculoskeletal:  Negative for gait problem and joint swelling.   Skin:  Negative for color change, pallor and rash.   Neurological:  Negative for dizziness, tremors, weakness, light-headedness, numbness and headaches.   Hematological:  Does not bruise/bleed easily.   Psychiatric/Behavioral:  Negative for agitation, behavioral problems, confusion and sleep  disturbance.        Objective   Physical Exam  Constitutional:       General: He is awake.      Appearance: Normal appearance. He is well-developed.   HENT:      Head: Normocephalic and atraumatic.      Right Ear: Hearing normal.      Left Ear: Hearing normal.      Nose: Nose normal.      Mouth/Throat:      Lips: Pink.      Mouth: Mucous membranes are moist.   Eyes:      General: Lids are normal.      Extraocular Movements: Extraocular movements intact.      Conjunctiva/sclera: Conjunctivae normal.      Pupils: Pupils are equal, round, and reactive to light.   Cardiovascular:      Rate and Rhythm: Normal rate and regular rhythm.      Pulses: Normal pulses.      Heart sounds: Normal heart sounds.   Pulmonary:      Effort: Pulmonary effort is normal.      Breath sounds: Normal breath sounds.   Abdominal:      General: Abdomen is flat. Bowel sounds are normal.      Palpations: Abdomen is soft.   Musculoskeletal:      Cervical back: Normal range of motion and neck supple.   Feet:      Right foot:      Skin integrity: Skin integrity normal.      Left foot:      Skin integrity: Skin integrity normal.   Skin:     General: Skin is warm.   Neurological:      General: No focal deficit present.      Mental Status: He is alert and oriented to person, place, and time.      Cranial Nerves: Cranial nerves 2-12 are intact.      Sensory: Sensation is intact.      Motor: Motor function is intact.      Coordination: Coordination is intact.      Gait: Gait is intact.   Psychiatric:         Attention and Perception: Attention and perception normal.         Mood and Affect: Mood normal.         Speech: Speech normal.         Behavior: Behavior is cooperative.         Thought Content: Thought content normal.         Cognition and Memory: Cognition normal.         Judgment: Judgment normal.         Current Outpatient Medications   Medication Sig Dispense Refill    ammonium lactate (Lac-Hydrin) 12 % lotion Apply topically if needed for dry  skin. 396 g 3    aspirin 81 mg EC tablet Take 1 tablet (81 mg) by mouth once daily.      b complex 0.4 mg tablet Take 1 tablet by mouth once daily.      digoxin (Lanoxin) 125 MCG tablet Take 1 tablet (125 mcg) by mouth once daily. 30 tablet 11    dofetilide (Tikosyn) 250 mcg capsule TAKE 2 CAPSULES BY MOUTH EVERY MORNING, TAKE 1 CAPSULE EVERY EVENING. 270 capsule 3    DULoxetine (Cymbalta) 30 mg DR capsule Take 1 capsule (30 mg) by mouth once daily. Do not crush or chew. 30 capsule 2    fluticasone (Flonase) 50 mcg/actuation nasal spray Administer 2 sprays into each nostril once daily.      hydrOXYzine pamoate (Vistaril) 25 mg capsule Take 1 capsule (25 mg) by mouth if needed.      blbtrxqvc-E4-mmK60-algal oil 3 mg-35 mg-2 mg -90.314 mg capsule Take 1 capsule by mouth early in the morning..      losartan (Cozaar) 100 mg tablet Take 1 tablet (100 mg) by mouth once daily. 90 tablet 3    magnesium oxide (Mag-Ox) 400 mg (241.3 mg magnesium) tablet Take 1 tablet (400 mg) by mouth every 12 hours.      nitroglycerin (Nitrostat) 0.4 mg SL tablet Place 1 tablet (0.4 mg) under the tongue if needed for chest pain. 25 tablet 5    rosuvastatin (Crestor) 20 mg tablet Take 1 tablet (20 mg) by mouth once daily. 30 tablet 10    tamsulosin (Flomax) 0.4 mg 24 hr capsule Take 1 capsule (0.4 mg) by mouth 2 times a day. 60 capsule 2     No current facility-administered medications for this visit.     LABS:   Lab Results   Component Value Date    ALBUMIN 4.1 02/13/2025    BILITOT 0.9 02/13/2025    ALKPHOS 83 02/13/2025    ALT 24 02/13/2025    AST 35 02/13/2025    PROT 6.4 03/12/2025      Lab Results   Component Value Date    WBC 3.9 03/12/2025    HGB 14.4 03/12/2025    HCT 42.7 03/12/2025    MCV 87.7 03/12/2025    PLT 43 (L) 03/12/2025     Lab Results   Component Value Date    INR 1.1 06/26/2023        === 03/17/25 ===    MR LIVER WO AND W CONTRAST    - Impression -  Liver ultrasound with Doppler 13 February 2025 noted fatty liver with  a  possible liver lesion    Today's MRI redemonstrates hepatic steatosis, but the only  potentially corresponding focal abnormality in the liver on today's  exam is an area of relative fatty sparing which I have annotated on  the out of phase chemical shift pulse sequence series 3, image 20  (will also appear in the key image series). This is roughly similar  in size to the perceived liver abnormality and would account for the  findings under ultrasound (the difference in echogenicity between the  lesions/pseudolesion versus the background liver on prior ultrasound  also supports the notion it is only an area of focal fatty sparing,  not a true lesion)    Today's more sensitive and specific MRI with and without contrast  confirms no enhancing hepatic (or any other abdominal) mass suspect  for neoplasm    All three hepatic veins are patent; no acute hepatic venous thrombosis    The entire portal venous system including the splenic vein, SMV and  its major tributaries, main and intrahepatic portal veins are all  patent; no acute portal venous thrombosis    Borderline to mild splenomegaly but unchanged back through 6 January 2023, the oldest comparison exam available    No abdominal adenopathy, free fluid or fluid collection    The large left renal cyst is Bosniak type 2; the smaller, Bosniak  type 1. Neither require follow-up    MACRO:  None    Signed by: Gianluca Pagan 3/18/2025 10:21 AM  Dictation workstation:   [id number]        Assessment/Plan   Problem List Items Addressed This Visit             ICD-10-CM    Hepatic steatosis K76.0     64 year old with imaging showing hepatic steatosis with normal liver enzymes/function tests.  No evidence of cirrhosis.  Risk factors for fatty liver include HTN, CAD, HLD.   Asymptomatic    Discussed natural history of fatty liver including risk factors and management.  Exercise/Activity  Vigorous physical activity like brisk walking or structured gym exercises 3 times a week for 30  minutes at minimum.    Resistance training to build muscle mass which will aid in weight loss.  Swimming, water aerobics are excellent forms of exercises that are easy on joints.    Exercise for 30 minutes or more at least 3 times a week  Aim to lose 7-10% of your total body weight over 6-12 months  Diet  Avoid/Limit simple carbs including simple sugars  Avoid eating foods that are rich in sugar in excess such as high sugar content fruits (pineapple, ana...) and desserts, cakes and pies  Eat more good foods that are rich in good fat such as cold water fish (salmon, swordfish...) as well as avocados and peanut butter in moderation  Snack on nuts that are high in protein and good oils such as walnuts, almonds.   Eat a mediteranean diet which is rich in grilled lean meats, high protein beans and legumes and olive oil.          Will complete fibroscan to grade and stage fatty liver.  Based on these findings, will determine follow-up and treatment course, which could include new drug for ELLSWORTH.              Relevant Orders    Liver Elastography (Fibroscan)            THI Dean 03/21/25 7:59 AM

## 2025-03-25 RX ORDER — CETIRIZINE HYDROCHLORIDE 10 MG/1
10 TABLET ORAL DAILY
Qty: 30 TABLET | Refills: 0 | OUTPATIENT
Start: 2025-03-25 | End: 2025-04-24

## 2025-03-26 ENCOUNTER — TELEPHONE (OUTPATIENT)
Dept: CARDIOLOGY | Facility: CLINIC | Age: 65
End: 2025-03-26
Payer: COMMERCIAL

## 2025-03-26 NOTE — TELEPHONE ENCOUNTER
Called patient's number and spoke to patient's emergency contact about moving about scheduled for 05/02 to be with Patrica. She said it was okay to move the appointment to be with Patrica at 830am for same day. She said she would have patient call back if it was huge issue to change appointment

## 2025-03-28 ENCOUNTER — APPOINTMENT (OUTPATIENT)
Dept: NEUROSURGERY | Facility: CLINIC | Age: 65
End: 2025-03-28
Payer: COMMERCIAL

## 2025-04-17 ENCOUNTER — CONSULT (OUTPATIENT)
Dept: ORTHOPEDIC SURGERY | Facility: CLINIC | Age: 65
End: 2025-04-17
Payer: COMMERCIAL

## 2025-04-17 DIAGNOSIS — G57.53 TARSAL TUNNEL SYNDROME OF BOTH LOWER EXTREMITIES: ICD-10-CM

## 2025-04-17 DIAGNOSIS — G61.89 OTHER INFLAMMATORY POLYNEUROPATHIES: ICD-10-CM

## 2025-04-17 DIAGNOSIS — M54.16 LUMBAR RADICULOPATHY: ICD-10-CM

## 2025-04-17 PROCEDURE — 99244 OFF/OP CNSLTJ NEW/EST MOD 40: CPT | Performed by: PHYSICAL MEDICINE & REHABILITATION

## 2025-04-17 PROCEDURE — 99214 OFFICE O/P EST MOD 30 MIN: CPT | Performed by: PHYSICAL MEDICINE & REHABILITATION

## 2025-04-17 RX ORDER — PREGABALIN 25 MG/1
25 CAPSULE ORAL 2 TIMES DAILY
Qty: 60 CAPSULE | Refills: 2 | Status: SHIPPED | OUTPATIENT
Start: 2025-04-17 | End: 2025-05-17

## 2025-04-17 ASSESSMENT — PAIN SCALES - GENERAL: PAINLEVEL_OUTOF10: 8

## 2025-04-17 ASSESSMENT — PAIN - FUNCTIONAL ASSESSMENT: PAIN_FUNCTIONAL_ASSESSMENT: 0-10

## 2025-04-17 NOTE — PROGRESS NOTES
New Consult/New Patient Note    4/17/2025   Kwabena Macias, DARIANA    Assessment: Pleasant 64-year-old male with chronic bilateral lower extremity paresthesia and pain.  Symptoms seem to start at the foot and ankle and radiate up.  Denies any significant pain above the knee.  Symptoms are not classic for neurogenic claudication or lumbar radiculopathy however he does have severe central canal stenosis at L3-4.  Fortunately, denies any worsening neurological deficits  - At this time his main pain generator seems to be more of a peripheral process-peripheral neuropathy versus possible vascular component.  There is no obvious discoloration or significant skin changes in the feet or ankle.  Palpable pulses.  Did recommend he at least discuss this possibility with his PCP  - Advised that if he has any worsening lower back pain or more classic neurogenic claudication type symptoms to return to our office and we could further discuss.    PLAN:  1)  Imaging/Diagnostic Studies: Extensive reviewed interpreted most recent lumbar MRI as well as Kwabena macias most recent note.  Severe central canal stenosis at L3-4.  Lumbar x-ray also personally reviewed interpreted with noted anterolisthesis at L3-4 and significant facet arthropathy from L4-S1 bilaterally.  Most recent CMP reviewed with no significant renal impairment.  Of note, he did have a positive serum protein electrophoresis  2)  Therapy/Rehabilitation: Completed with no significant benefit  3)  Pharmacological Management: Will trial Lyrica 25 mg up to twice daily-advised potential sedate of side effects.  OARRS checked no suspicious activity and pain agreement was signed today.  4)  Spine/Surgical Interventions: Discussed possible lumbar ANYA but given his very atypical symptoms we deferred at this time.  5)  Alternative Treatments: May consider alternative treatment options in the future including manipulation (chiropractor versus osteopathic) and/or acupuncture if patient  does not obtain optimal relief with initial treatment plan.  6)  Consultations:  None at this time  7)  Follow -up: 4-6 weeks or PRN if symptoms worsen/do not improve.   8)  Future treatment considerations: Further titration of Lyrica.  Consider trial of lumbar ANYA    Patient advised of the difference between hurt and harm and advised to continue with all normal activities and exercises. Patient verbalized understanding of the above plan and was happy with the care provided.      The above clinical summary has been dictated with voice recognition software. It has not been proofread for grammatical errors, typographical mistakes, or other semantic inconsistencies.    Thank you for visiting our office today. It was our pleasure to take part in your healthcare.     Do not hesitate to call with any questions regarding your plan of care after leaving at [phone]    To clinicians, thank you very much for this kind referral. It is a privilege to partner with you in the care of your patients. My office would be delighted to assist you with any further consultations or with questions regarding the plan of care outlined. Do not hesitate to call the office or contact me directly.     Sincerely,    AISHA Faustin MD  , Physical Medicine and Rehabilitation, Orthopedic Spine  [facility] [employer] [facility]  [facility] [place]         Ton BRIA Almaraz   is a 64 y.o. male who presents with chronic bilateral lower extremity paresthesia and tingling with intermittent pain.  Several surgical spine-Kwabena milks.  Known lumbar stenosis but symptoms somewhat atypical from a lumbar standpoint.  Location:  Pain starts at either great toe, entire foot to just above the ankle.  Typically not above the knee.  Also with lower back pain, bilateral, at the pantline.  Denies any clear radicular pain.  No worsening weakness  Radiation:  as above  Quality: sharp, burning   current 7/10,   "at its worst 9-10/10  Exacerbated by \"unclear\"  Relieved by \"unclear\"  Onset, traumatic event: no recent  Has tried:  infra-red light     Valsalva sign is neg  Grocery cart sign is neg  Smoker:  no  Does wake them at night  Litigation: none    Patient denies bowel/bladder incontinence, denies fever, denies unintentional weight loss, denies clumsiness of hands, feet, or dropping things.  Denies any constitutional or myelopathic symptomatology.      PREVIOUS TREATMENTS  IN THE LAST SIX MONTHS     Active conservative therapy  in the last six months (see below)              1. Physical therapy:  not in the past 6 months                                                                                   2. Home exercise program after PT:  no                                                    3. A physician supervised home exercise program (HEP): no                4. Chiropractic Care:  no                                                                   Passive conservative therapy  in the last six months (see below)              1. NSAIDS:  advil                                                                                                         2. Prescription pain medication: Gabapentin, Duloxetine                                                            3. Acupuncture:  no                                                                                        4. Tens unit: no     Assistive Devices: none    Work status:        ROS: Other than listed in HPI, PMHX below, and intake paperwork including a 30 point patient-recorded review of symptoms which was personally reviewed and inclusive of no history of unintentional weight loss, change in appetite, significant malaise, fevers, chills, or change in bowel/bladder, shortness of breath, or chest pain.    I have confirmed and edited as necessary Past Medical, Past Surgical, Family, Social History and ROS as obtained by others. These were also obtained on " new patient forms.      PHYSICAL EXAM:   GENERAL APPEARANCE:  Well nourished, well developed, and no apparent distress.  NEURO PSYCH: Patient oriented to person, place, Mood pleasant. Benign affect.  MUSCULOSKELETAL and NEUROLOGICAL   SPINE ROM:   LUMBAR ROM: Full.  No significant reproduction of pain with 30 second extension testing  MUSCLE BULK: Normal and symmetrical in the upper & lower extremities.  MUSCLE TONE: Normal  MOTOR: 5/5 in all muscle groups tested in bilateral lower extremities   SENSORY: Normal sensory exam to light touch in the bilateral lower extremities  GAIT: Normal.   No sig. balance deficit appreciated  No obvious erythema or skin changes in the bilateral foot or ankle.    DATA REVIEW:   The below imaging studies were personally reviewed and discussed with the patient.    Medical Decision Making:  The above note constitutes a Moderate to High level of medical decision making based on past data and imaging review, new and chronic symptoms with exacerbation, change in weakness or sensation, new imaging and diagnostic studies ordered, discussion of potential interventional or surgical treatment options, acute or chronic pain that may pose a threat to bodily function.    Medical History[1]    Medication Documentation Review Audit       Reviewed by Kostas Guzman MA (Medical Assistant) on 04/17/25 at 0843      Medication Order Taking? Sig Documenting Provider Last Dose Status   ammonium lactate (Lac-Hydrin) 12 % lotion [id number]  Apply topically if needed for dry skin. Jose G Lr,   Active   aspirin 81 mg EC tablet [id number]  Take 1 tablet (81 mg) by mouth once daily. Historical Provider, MD  Active   b complex 0.4 mg tablet [id number] No Take 1 tablet by mouth once daily. Historical Provider, MD Taking Active   digoxin (Lanoxin) 125 MCG tablet [id number]  Take 1 tablet (125 mcg) by mouth once daily. Nia Estes, APRN-CNP  Active   dofetilide (Tikosyn) 250 mcg capsule [id number]  TAKE 2  CAPSULES BY MOUTH EVERY MORNING, TAKE 1 CAPSULE EVERY EVENING. Claire Sanz MD  Active   DULoxetine (Cymbalta) 30 mg DR capsule [id number]  Take 1 capsule (30 mg) by mouth once daily. Do not crush or chew. Salome Pearson, DO  Active   fluticasone (Flonase) 50 mcg/actuation nasal spray [id number] No Administer 2 sprays into each nostril once daily. Historical Provider, MD Taking Active   hydrOXYzine pamoate (Vistaril) 25 mg capsule [id number]  Take 1 capsule (25 mg) by mouth if needed. Historical Provider, MD  Active   yajatotad-Y3-utY73-algal oil 3 mg-35 mg-2 mg -90.314 mg capsule [id number]  Take 1 capsule by mouth early in the morning.. Historical Provider, MD  Active   losartan (Cozaar) 100 mg tablet [id number]  Take 1 tablet (100 mg) by mouth once daily. Alberto Franco MD  Active   magnesium oxide (Mag-Ox) 400 mg (241.3 mg magnesium) tablet [id number]  Take 1 tablet (400 mg) by mouth every 12 hours. Historical Provider, MD  Active   nitroglycerin (Nitrostat) 0.4 mg SL tablet [id number] No Place 1 tablet (0.4 mg) under the tongue if needed for chest pain. Alberto Franco MD Taking Active   rosuvastatin (Crestor) 20 mg tablet [id number]  Take 1 tablet (20 mg) by mouth once daily. Jose G Lr, DO  Active   tamsulosin (Flomax) 0.4 mg 24 hr capsule [id number]  Take 1 capsule (0.4 mg) by mouth 2 times a day. Stacey Trejo PA-C  Active                    RX Allergies[2]    Social History     Socioeconomic History    Marital status:      Spouse name: Not on file    Number of children: Not on file    Years of education: Not on file    Highest education level: Not on file   Occupational History    Not on file   Tobacco Use    Smoking status: Never    Smokeless tobacco: Never   Vaping Use    Vaping status: Never Used   Substance and Sexual Activity    Alcohol use: Not Currently     Comment: very rarely    Drug use: Not Currently    Sexual activity: Defer   Other Topics Concern    Not on file    Social History Narrative    Not on file     Social Drivers of Health     Financial Resource Strain: Low Risk  (12/17/2024)    Received from [facility]    Overall Financial Resource Strain (CARDIA)     Difficulty of Paying Living Expenses: Not hard at all   Food Insecurity: No Food Insecurity (12/17/2024)    Received from [facility]    Hunger Vital Sign     Worried About Running Out of Food in the Last Year: Never true     Ran Out of Food in the Last Year: Never true   Transportation Needs: Unknown (12/17/2024)    Received from [facility]    PRAPARE - Transportation     Lack of Transportation (Medical): Not on file     Lack of Transportation (Non-Medical): No   Physical Activity: Not on file   Stress: Not on file   Social Connections: Not on file   Intimate Partner Violence: Not on file   Housing Stability: Unknown (12/17/2024)    Received from [facility]    Housing Stability Vital Sign     Unable to Pay for Housing in the Last Year: Not on file     Number of Times Moved in the Last Year: Not on file     Homeless in the Last Year: No       Surgical History[3]         [1]   Past Medical History:  Diagnosis Date    Acute serous otitis media, left ear 05/19/2017    Acute serous otitis media, left ear    Body mass index (BMI) 33.0-33.9, adult 03/15/2022    BMI 33.0-33.9,adult    Encounter for preprocedural cardiovascular examination 02/22/2022    Preop cardiovascular exam    Other chest pain 11/04/2021    Chest pain, non-cardiac    Other specified counseling 03/15/2022    Encounter for medication counseling    Other specified symptoms and signs involving the circulatory and respiratory systems 11/04/2021    Bruit of right carotid artery    Other specified symptoms and signs involving the circulatory and respiratory systems 11/04/2021    Decreased pedal pulses    Pain in leg, unspecified 11/04/2021    Leg pain    Person  consulting for explanation of examination or test findings 03/15/2022    Encounter to discuss test results    Persons encountering health services in other specified circumstances 02/22/2022    Encounter to establish care with new doctor   [2]   Allergies  Allergen Reactions    Diltiazem Unknown    Metoprolol Succinate Unknown    Oxycodone Unknown   [3]   Past Surgical History:  Procedure Laterality Date    OTHER SURGICAL HISTORY  11/04/2021    Appendectomy    OTHER SURGICAL HISTORY  11/04/2021    Colonoscopy    OTHER SURGICAL HISTORY  11/04/2021    Cardioversion    OTHER SURGICAL HISTORY  11/04/2021    Knee replacement    OTHER SURGICAL HISTORY  11/17/2021    Cardiac catheterization with stent placement

## 2025-04-27 DIAGNOSIS — I48.19 OTHER PERSISTENT ATRIAL FIBRILLATION: ICD-10-CM

## 2025-04-28 RX ORDER — LANOLIN ALCOHOL/MO/W.PET/CERES
CREAM (GRAM) TOPICAL
Qty: 180 TABLET | Refills: 3 | Status: SHIPPED | OUTPATIENT
Start: 2025-04-28

## 2025-04-28 NOTE — TELEPHONE ENCOUNTER
Received request for prescription refills for patient.   Patient follows with Dr. Sanz     Request is for Magnesium Oxide 40 mg   Is patient currently on medication YES    Last OV 4/16/24  Next OV TBD 5/02/25 With Patrica Fernández     Pended for signing and sent to provider

## 2025-05-01 NOTE — PROGRESS NOTES
Electrophysiology Office Visit     CHIEF COMPLAINT  Chief Complaint   Patient presents with    Follow-up     6 month for afib      Primary EP doctor: Dr Sanz  Primary Cardiologist: Dr Franco    EP History: persistent AF, Watchman, CAD s/p LAD stent, remote TIA, PMR, RA, fatty liver  9/3/2019- 9/5/2019 Admit for AF RVR. IV cardizem, IVAN/DCCV  1/31/2021 - 2/1/2021 Admitted w/ nSTEMI s/p Stenting to mid LAD on 2/1/21  2/15/2022 DCCV  2/28/2022 - 3/2/2022 Admit for Dofetilide loading and DCCV  1/6/2023 LAAO with Watchman due to not being able to take RA meds with a DOAC.   Maintained on Dofetilide 500mg in AM & 250 mcg PM, digoxin 0.125 mg daily, MgOx 400mg BID. Watchman   Intolerance to beta-blocker due to chronic cough and calcium channel blocker due to increasing lower extremity edema.     HPI: 5/2/2025  64 year old male pmhx persistent AF, Watchman, CAD s/p LAD stent, remote TIA, PMR, RA, fatty liver.     Here for 6 month OV for AF. On dofetilide, digoxin, Mgox. Has Watchman. No cardiac concerns.     Feels well but c/o diffuse itching. He is going to a neurologist, Salome Pearson, , for LE issues w/ progressive dysesthesia in BLE. Will increase duloxetine and have neuromuscular ultrasound along with EMG.     Today's EKG Interpretation: NSR, rate 70bpm, pr 190ms qrs 98 ms, qtc 432ms    VITALS  Vitals:    05/02/25 0841   BP: 144/76   Pulse: 70     Wt Readings from Last 4 Encounters:   05/02/25 112 kg (246 lb)   03/21/25 113 kg (248 lb 9.6 oz)   03/17/25 111 kg (245 lb 9.5 oz)   03/17/25 112 kg (247 lb)       PHYSICAL EXAM:  GENERAL:  Well developed, well nourished, in no acute distress.  NEURO/PSYCH:  No focal deficits. A&O x 3   LUNGS:  Clear to auscultation bilaterally. No wheezing, rhonci, or crackles  HEART:  RRR, no M/R/G.   EXTREMITIES:  Warm with good color, no clubbing or cyanosis.  No pitting edema.     Medical History[1]  Surgical History[2]  Social History[3]  Family History[4]  RX Allergies[5]    Current Outpatient Medications   Medication Instructions    ammonium lactate (Lac-Hydrin) 12 % lotion Topical, As needed    aspirin 81 mg EC tablet 1 tablet, Daily    b complex 0.4 mg tablet 1 tablet, Daily    cetirizine (ZYRTEC) 10 mg, oral, Daily    digoxin (LANOXIN) 125 mcg, oral, Daily    dofetilide (Tikosyn) 250 mcg capsule TAKE 2 CAPSULES BY MOUTH EVERY MORNING, TAKE 1 CAPSULE EVERY EVENING.    DULoxetine (CYMBALTA) 30 mg, oral, Daily, Do not crush or chew.    fluticasone (Flonase) 50 mcg/actuation nasal spray 2 sprays, Daily    hydrOXYzine pamoate (VISTARIL) 25 mg, As needed    lylyrfmpc-[id number]-algal oil 3 mg-35 mg-2 mg -90.314 mg capsule 1 capsule, Daily    losartan (COZAAR) 100 mg, oral, Daily    magnesium oxide (Mag-Ox) 400 mg (241.3 mg elemental) tablet Take 1 tablet (400 mg) by mouth 2 times a day.    nitroglycerin (NITROSTAT) 0.4 mg, sublingual, As needed    pregabalin (LYRICA) 25 mg, oral, 2 times daily    rosuvastatin (CRESTOR) 20 mg, oral, Daily    tamsulosin (FLOMAX) 0.4 mg, oral, 2 times daily      Relevant reports:   2/1/2021 Heart Cath  1. The entire Left Main: 0% stenosis.   2. Proximal LAD Lesion: The percent stenosis is 40%.   3. Mid LAD Lesion: The percent stenosis is 90%.   4. Mid LAD Lesion: pre-dilation, Xience Alpine 3.5x15 post-dilation: <10% residual stenosis: pre-procedure MARCY flow was 3(complete perfusion) and post-procedure MARCY flow was 3(complete perfusion).   5. Proximal and mid CX Lesion: The percent stenosis is 10%.   6. Mid RCA Lesion: The percent stenosis is 10-30%.   7. The Left Ventricular Ejection Fraction is 55%.    9/4/2019 ECHO  1. Normal echocardiogram except the patient's atrial fibrillation controlled rate  2. Normal left and right ventricular systolic function with an estimated ejection fraction of the LV at 55-60%  3. Normal LV diastolic filling pattern for atrial fib  4. Normal aortic, mitral, tricuspid and pulmonic valves with trivial mitral and tricuspid  insufficiency with a normal RVSP in the range of 20  5. Normal left and right atrium  6. Normal aortic root  7. Normal inferior vena cava with normal inspiratory response  8. No apparent pericardial effusion, echo producing mass or intracardiac shunt  9. No comparison tracing available for comment    3/16/2018 Exercise stress test  1. Exercise-induced chest pain, which was right-sided.   2. No significant ST changes with exercise.  Frequent single PVCs throughout the test. The patient had rare ventricular couplets. There was 1 ventricular triplet.    11/10/2021 Carotid duplex: BL ICA <50%    Problem List[6]     ASSESSMENT AND PLAN  Problem List Items Addressed This Visit       CAD S/P percutaneous coronary angioplasty  Stenting to mid LAD on 2/1/21    Persistent atrial fibrillation (Multi)  Currently doing well on dofetilide, digoxin and magnesium oxide.  Continue this regimen.  EKG shows normal sinus rhythm  Follow-up in 6 months with Dr. Sanz    Relevant Orders    ECG 12 lead (Clinic Performed)    Follow Up In Cardiology    Presence of Watchman left atrial appendage closure device on 1/6/2023     Chronic pruritus - Primary  Patient has diffuse chronic pruritus of the skin over the past 3 months.  I ran a drug interaction check on [employer] for Zyrtec with digoxin and dofetilide and there are no interactions.  I told him to take Zyrtec 10 mg once daily. I prescribed this medication to his pharmacy    Relevant Medications    cetirizine (ZyrTEC) 10 mg tablet        THERON Jeffries, DARIANA             [1]   Past Medical History:  Diagnosis Date    Acute serous otitis media, left ear 05/19/2017    Acute serous otitis media, left ear    Body mass index (BMI) 33.0-33.9, adult 03/15/2022    BMI 33.0-33.9,adult    Encounter for preprocedural cardiovascular examination 02/22/2022    Preop cardiovascular exam    Other chest pain 11/04/2021    Chest pain, non-cardiac    Other specified counseling 03/15/2022    Encounter for  medication counseling    Other specified symptoms and signs involving the circulatory and respiratory systems 2021    Bruit of right carotid artery    Other specified symptoms and signs involving the circulatory and respiratory systems 2021    Decreased pedal pulses    Pain in leg, unspecified 2021    Leg pain    Person consulting for explanation of examination or test findings 03/15/2022    Encounter to discuss test results    Persons encountering health services in other specified circumstances 2022    Encounter to establish care with new doctor   [2]   Past Surgical History:  Procedure Laterality Date    OTHER SURGICAL HISTORY  2021    Appendectomy    OTHER SURGICAL HISTORY  2021    Colonoscopy    OTHER SURGICAL HISTORY  2021    Cardioversion    OTHER SURGICAL HISTORY  2021    Knee replacement    OTHER SURGICAL HISTORY  2021    Cardiac catheterization with stent placement   [3]   Social History  Tobacco Use    Smoking status: Never    Smokeless tobacco: Never   Vaping Use    Vaping status: Never Used   Substance Use Topics    Alcohol use: Not Currently     Comment: very rarely    Drug use: Not Currently   [4]   Family History  Problem Relation Name Age of Onset    Scleroderma Mother      Other (scleroderma involving lung) Mother      Prostate cancer Father          blood  clot      Lung cancer Brother      Coronary artery disease Other Family hx    [5]   Allergies  Allergen Reactions    Diltiazem Unknown    Gabapentin Drowsiness    Metoprolol Succinate Unknown    Oxycodone Unknown    Pregabalin Drowsiness   [6]   Patient Active Problem List  Diagnosis    Bilateral carotid artery stenosis    CAD S/P percutaneous coronary angioplasty    Chest pain    Essential hypertension    History of non-ST elevation myocardial infarction (NSTEMI)    Hypokalemia    Lower extremity edema    Mixed hyperlipidemia    PAD (peripheral artery disease) (CMS-HCC)    Persistent  atrial fibrillation (Multi)    Polymyalgia rheumatica (Multi)    TIA (transient ischemic attack)    Class 1 obesity due to excess calories with body mass index (BMI) of 30.0 to 30.9 in adult    Class 1 obesity due to excess calories with body mass index (BMI) of 31.0 to 31.9 in adult    History of CVA (cerebrovascular accident)    High risk medication use    Never smoked any substance    On dofetilide therapy    Presence of Watchman left atrial appendage closure device    Rheumatoid arthritis    Family history of ischemic heart disease    Encounter for medication review and counseling    Encounter to discuss treatment options    BMI 32.0-32.9,adult    Benign prostatic hyperplasia with lower urinary tract symptoms    Other constipation    Thrombocytopenia (CMS-HCC)    Hepatic steatosis    Renal cyst    Chronic pruritus

## 2025-05-02 ENCOUNTER — OFFICE VISIT (OUTPATIENT)
Dept: UROLOGY | Facility: CLINIC | Age: 65
End: 2025-05-02
Payer: COMMERCIAL

## 2025-05-02 ENCOUNTER — APPOINTMENT (OUTPATIENT)
Dept: CARDIOLOGY | Facility: CLINIC | Age: 65
End: 2025-05-02
Payer: COMMERCIAL

## 2025-05-02 VITALS
HEART RATE: 70 BPM | BODY MASS INDEX: 31.57 KG/M2 | WEIGHT: 246 LBS | DIASTOLIC BLOOD PRESSURE: 76 MMHG | HEIGHT: 74 IN | SYSTOLIC BLOOD PRESSURE: 144 MMHG

## 2025-05-02 VITALS
WEIGHT: 247.58 LBS | DIASTOLIC BLOOD PRESSURE: 96 MMHG | SYSTOLIC BLOOD PRESSURE: 158 MMHG | HEIGHT: 74 IN | HEART RATE: 71 BPM | BODY MASS INDEX: 31.77 KG/M2

## 2025-05-02 DIAGNOSIS — Z98.61 CAD S/P PERCUTANEOUS CORONARY ANGIOPLASTY: ICD-10-CM

## 2025-05-02 DIAGNOSIS — I25.10 CAD S/P PERCUTANEOUS CORONARY ANGIOPLASTY: ICD-10-CM

## 2025-05-02 DIAGNOSIS — N40.1 BENIGN PROSTATIC HYPERPLASIA WITH NOCTURIA: ICD-10-CM

## 2025-05-02 DIAGNOSIS — L29.9 CHRONIC PRURITUS: Primary | ICD-10-CM

## 2025-05-02 DIAGNOSIS — R35.1 BENIGN PROSTATIC HYPERPLASIA WITH NOCTURIA: ICD-10-CM

## 2025-05-02 DIAGNOSIS — Z95.818 PRESENCE OF WATCHMAN LEFT ATRIAL APPENDAGE CLOSURE DEVICE: ICD-10-CM

## 2025-05-02 DIAGNOSIS — I48.19 PERSISTENT ATRIAL FIBRILLATION (MULTI): ICD-10-CM

## 2025-05-02 DIAGNOSIS — R31.29 MICROSCOPIC HEMATURIA: Primary | ICD-10-CM

## 2025-05-02 DIAGNOSIS — R35.0 URINARY FREQUENCY: ICD-10-CM

## 2025-05-02 LAB
POC APPEARANCE, URINE: CLEAR
POC BILIRUBIN, URINE: NEGATIVE
POC BLOOD, URINE: ABNORMAL
POC COLOR, URINE: YELLOW
POC GLUCOSE, URINE: NEGATIVE MG/DL
POC KETONES, URINE: NEGATIVE MG/DL
POC LEUKOCYTES, URINE: NEGATIVE
POC NITRITE,URINE: NEGATIVE
POC PH, URINE: 7 PH
POC PROTEIN, URINE: NEGATIVE MG/DL
POC SPECIFIC GRAVITY, URINE: 1.01
POC UROBILINOGEN, URINE: 0.2 EU/DL

## 2025-05-02 PROCEDURE — 99213 OFFICE O/P EST LOW 20 MIN: CPT

## 2025-05-02 PROCEDURE — 3077F SYST BP >= 140 MM HG: CPT

## 2025-05-02 PROCEDURE — 93000 ELECTROCARDIOGRAM COMPLETE: CPT | Performed by: INTERNAL MEDICINE

## 2025-05-02 PROCEDURE — 1036F TOBACCO NON-USER: CPT

## 2025-05-02 PROCEDURE — 3080F DIAST BP >= 90 MM HG: CPT

## 2025-05-02 PROCEDURE — 81003 URINALYSIS AUTO W/O SCOPE: CPT

## 2025-05-02 PROCEDURE — 3008F BODY MASS INDEX DOCD: CPT

## 2025-05-02 RX ORDER — TAMSULOSIN HYDROCHLORIDE 0.4 MG/1
0.4 CAPSULE ORAL 2 TIMES DAILY
Qty: 180 CAPSULE | Refills: 1 | Status: SHIPPED | OUTPATIENT
Start: 2025-05-02 | End: 2025-10-29

## 2025-05-02 RX ORDER — CETIRIZINE HYDROCHLORIDE 10 MG/1
10 TABLET ORAL DAILY
Qty: 30 TABLET | Refills: 6 | Status: SHIPPED | OUTPATIENT
Start: 2025-05-02 | End: 2026-05-02

## 2025-05-02 NOTE — PROGRESS NOTES
Subjective   Patient ID: Ton Almaraz is a 64 y.o. male.    HPI  Patient presents for urinary frequency fu after increasing dosage of Flomax to BID.  Patient states he saw an improvement overall and is pleased, specifically less frequency at night.      Daytime frequency q3-4h (prev 1-2).  Nocturia 1-2 (2-3x).  He admits to snoring, has never had a sleep study but was told he could have KATHY.  After supper he stops fluids (4-5 hours before bed).  No longer having urgency.  Stronger stream with larger volumes at a time.  Reports 70-80% improvement in his urinary sxs overall.  No hx of gross hematuria.    Pertinent  hx    PSA  2025 = 1.20    01/20/2025 US liver: 7.2 cm left renal cyst    Review of Systems  See HPI.    Objective   Physical Exam  Vitals:    05/02/25 0950   BP: (!) 158/96   Pulse: 71     Alert and oriented x3  No acute distress, cooperative  Breathes easily on room air  Normal range of motion  No focal neurological deficits  Appears stated age    Results for orders placed or performed in visit on 05/02/25 (from the past 24 hours)   POCT UA Automated manually resulted   Result Value Ref Range    POC Color, Urine Yellow Straw, Yellow, Light-Yellow    POC Appearance, Urine Clear Clear    POC Glucose, Urine NEGATIVE NEGATIVE mg/dl    POC Bilirubin, Urine NEGATIVE NEGATIVE    POC Ketones, Urine NEGATIVE NEGATIVE mg/dl    POC Specific Gravity, Urine 1.015 1.005 - 1.035    POC Blood, Urine TRACE-Lysed (A) NEGATIVE    POC PH, Urine 7.0 No Reference Range Established PH    POC Protein, Urine NEGATIVE NEGATIVE mg/dl    POC Urobilinogen, Urine 0.2 0.2, 1.0 EU/DL    Poc Nitrite, Urine NEGATIVE NEGATIVE    POC Leukocytes, Urine NEGATIVE NEGATIVE     Assessment/Plan   Diagnoses and all orders for this visit:  Urinary frequency  -     POCT UA Automated manually resulted  -     Post-Void Residual  -     Follow Up In Urology; Future  Benign prostatic hyperplasia with nocturia  -     tamsulosin (Flomax) 0.4 mg 24 hr  capsule; Take 1 capsule (0.4 mg) by mouth 2 times a day.  -     Follow Up In Urology; Future    Patient presents for BPH with urinary frequency fu after increasing Flomax to BID.  Overall from starting med to now he reports a 70-80% improvement and is much more pleased with his urinary sxs.  Refills sent to pharmacy.  UA with trace blood today, will send for micro. Explained to patient that microscopic hematuria is defined by the AUA as >3 or more red blood cells per high-power field on microscopic evaluation of a single urine specimen.  So long urine results are normal, I will see him back in 6 mo.  Patient agrees with plan and all questions answered.    PLAN:  Urine microscopy  Refill Flomax BID  RTO 6 mo    Stacey Trejo PA-C 05/02/25 9:41 AM

## 2025-05-02 NOTE — PATIENT INSTRUCTIONS
Behavioral Modification for Urinary Frequency:  -avoid/decrease bladder irritants (caffeine, soda, iced tea, energy drinks, alcohol).  -limit fluid intake 3-4 hrs before bedtime.

## 2025-05-02 NOTE — PATIENT INSTRUCTIONS
Great to see you today.   Please take your medications and or do life style behavior modifications as discussed.   Please make appointment in 6 months with Dr Yvon Floyd taking Zyrtec once daily for itching.  Please call if you have any questions or concerns.  Please go to emergency department if you have abrupt onset of chest, shortness of breath, light headedness or dizziness.

## 2025-05-04 LAB
APPEARANCE UR: CLEAR
BACTERIA UR CULT: NORMAL
BILIRUB UR QL STRIP: NEGATIVE
COLOR UR: YELLOW
GLUCOSE UR QL STRIP: NEGATIVE
HGB UR QL STRIP: NEGATIVE
KETONES UR QL STRIP: NEGATIVE
LEUKOCYTE ESTERASE UR QL STRIP: NEGATIVE
NITRITE UR QL STRIP: NEGATIVE
PH UR STRIP: 7.5 [PH] (ref 5–8)
PROT UR QL STRIP: NEGATIVE
SP GR UR STRIP: 1.01 (ref 1–1.03)

## 2025-05-09 ENCOUNTER — APPOINTMENT (OUTPATIENT)
Dept: NEUROLOGY | Facility: HOSPITAL | Age: 65
End: 2025-05-09
Payer: COMMERCIAL

## 2025-05-21 PROBLEM — D47.2 MGUS (MONOCLONAL GAMMOPATHY OF UNKNOWN SIGNIFICANCE): Status: ACTIVE | Noted: 2025-05-21

## 2025-05-21 PROBLEM — D69.6 THROMBOCYTOPENIA: Status: ACTIVE | Noted: 2025-05-21

## 2025-05-30 ENCOUNTER — PROCEDURE VISIT (OUTPATIENT)
Dept: NEUROLOGY | Facility: HOSPITAL | Age: 65
End: 2025-05-30
Payer: COMMERCIAL

## 2025-05-30 DIAGNOSIS — R20.2 NUMBNESS AND TINGLING OF BOTH FEET: Primary | ICD-10-CM

## 2025-05-30 DIAGNOSIS — R20.0 NUMBNESS AND TINGLING OF BOTH FEET: Primary | ICD-10-CM

## 2025-05-30 PROCEDURE — 76883 US NRV&ACC STRUX 1XTR COMPRE: CPT | Performed by: STUDENT IN AN ORGANIZED HEALTH CARE EDUCATION/TRAINING PROGRAM

## 2025-05-30 NOTE — PROGRESS NOTES
NEUROMUSCULAR ULTRASOUND OF THE BILATERAL ANKLE    INDICATION:  Clinical Information: 2 year history of numbness and tingling primarily in the sole of his bilateral feet. Does feel like maybe there is some numbness and tingling on the dorsum of his bilateral feet but nothing at the level of the ankles or proximal.    Neuromuscular ultrasound to be performed:    (a) to evaluate the echotexture and size of the left tibial nerve at the tarsal tunnel;  (b) to assess for any identifiable structural source of left distal tibial nerve compression.    HEIGHT: 6 ft 2 in  WEIGHT: 240 lbs.    COMPARISON:   None.    TECHNIQUE:  Neuromuscular ultrasound of the bilateral ankles was performed using a [employer] P9 ultrasound machine with a 15-6 MHz matrix linear transducer. The tibial nerve was examined adjacent under the flexor retinaculum adjacent to the medial malleolus, talus and calcaneus. The patient was examined supine. In addition, under the flexor retinaculum, tendons of the tibialis posterior, flexor digitorum longus and flexor hallucis longer were identified. The posterior tibial artery and adjacent veins were also identified.     FINDINGS:  The left tibial nerve CSA at the level of the medial malleolus was 10.1 mm2 (mean 13.7 mm2, upper limit of normal 22.3 mm2). The echogenicity of the left tibial nerve was normal. Color Doppler of the left tibial nerve showed normal nerve vascularity. No ganglion or other abnormal mass was appreciated. The posterior tibial artery and adjacent veins were also identified and were normal.     Slightly proximal up the lower extremity, the left tibial nerve CSA was 15.2 mm2. The echogenicity was normal.    Distal to the medial malleolus, the left tibial nerve branched into the medial and lateral plantar nerves. The left medial plantar nerve CSA was 5.3 mm2. The left lateral plantar nerve CSA was 4.0 mm2.    The echogenicity of the left plantar fascia was normal. The maximum depth of the left  plantar was 2.4 mm (NL < 4.6 mm). Color Doppler of the left plantar fascia showed normal vascularity.    The right tibial nerve CSA at the level of the medial malleolus was 11.3 mm2 (mean 13.7 mm2, upper limit of normal 22.3 mm2). The echogenicity of the right tibial nerve was normal. Color Doppler of the right tibial nerve showed normal nerve vascularity. No ganglion or other abnormal mass was appreciated. The posterior tibial artery and adjacent veins were also identified and were normal.     Slightly proximal up the lower extremity, the right tibial nerve CSA was 14.2 mm2. The echogenicity was normal.    Distal to the medial malleolus, the right tibial nerve branched into the medial and lateral plantar nerves. The right medial plantar nerve CSA was 4.6 mm2. The right lateral plantar nerve CSA was 4.7 mm2.    The echogenicity of the right plantar fascia was normal. The maximum depth of the right plantar was 3.7 mm (NL < 4.6 mm). Color Doppler of the right plantar fascia showed normal vascularity.    IMPRESSION:  This is a normal neuromuscular ultrasound examination of the bilateral tibial nerves and ankles.     There was no ultrasound evidence of a distal tibial neuropathy at the tarsal tunnel on either the left or the right by absolute criteria. The other nearby visualized osseous, ligamentous and tendinous structures of the ankle joint appeared normal.     The adjacent bilateral posterior tibial arteries and veins were also normal.    In addition, there was no ultrasound evidence of plantar fasciitis bilaterally.     Performed by: Real Paiz MD  Authorized by: Real Paiz MD

## 2025-06-09 DIAGNOSIS — M79.2 NEUROPATHIC PAIN: ICD-10-CM

## 2025-06-09 RX ORDER — DULOXETIN HYDROCHLORIDE 30 MG/1
60 CAPSULE, DELAYED RELEASE ORAL DAILY
Qty: 60 CAPSULE | Refills: 1 | Status: SHIPPED | OUTPATIENT
Start: 2025-06-09 | End: 2025-08-08

## 2025-06-13 ENCOUNTER — APPOINTMENT (OUTPATIENT)
Dept: NEUROLOGY | Facility: CLINIC | Age: 65
End: 2025-06-13
Payer: COMMERCIAL

## 2025-06-30 ENCOUNTER — APPOINTMENT (OUTPATIENT)
Dept: NEUROLOGY | Facility: CLINIC | Age: 65
End: 2025-06-30
Payer: COMMERCIAL

## 2025-06-30 ENCOUNTER — TELEPHONE (OUTPATIENT)
Dept: NEUROLOGY | Facility: CLINIC | Age: 65
End: 2025-06-30

## 2025-06-30 ENCOUNTER — APPOINTMENT (OUTPATIENT)
Dept: GASTROENTEROLOGY | Facility: CLINIC | Age: 65
End: 2025-06-30
Payer: COMMERCIAL

## 2025-06-30 VITALS
WEIGHT: 244 LBS | HEIGHT: 74 IN | DIASTOLIC BLOOD PRESSURE: 96 MMHG | BODY MASS INDEX: 31.32 KG/M2 | SYSTOLIC BLOOD PRESSURE: 153 MMHG | HEART RATE: 76 BPM

## 2025-06-30 DIAGNOSIS — M79.2 NEUROPATHIC PAIN: ICD-10-CM

## 2025-06-30 DIAGNOSIS — G60.9 IDIOPATHIC PERIPHERAL NEUROPATHY: Primary | ICD-10-CM

## 2025-06-30 PROCEDURE — 3080F DIAST BP >= 90 MM HG: CPT | Performed by: STUDENT IN AN ORGANIZED HEALTH CARE EDUCATION/TRAINING PROGRAM

## 2025-06-30 PROCEDURE — 99215 OFFICE O/P EST HI 40 MIN: CPT | Performed by: STUDENT IN AN ORGANIZED HEALTH CARE EDUCATION/TRAINING PROGRAM

## 2025-06-30 PROCEDURE — 3077F SYST BP >= 140 MM HG: CPT | Performed by: STUDENT IN AN ORGANIZED HEALTH CARE EDUCATION/TRAINING PROGRAM

## 2025-06-30 PROCEDURE — 3008F BODY MASS INDEX DOCD: CPT | Performed by: STUDENT IN AN ORGANIZED HEALTH CARE EDUCATION/TRAINING PROGRAM

## 2025-06-30 RX ORDER — DEXAMETHASONE 4 MG/1
TABLET ORAL
COMMUNITY
Start: 2025-06-09

## 2025-06-30 RX ORDER — DULOXETIN HYDROCHLORIDE 30 MG/1
90 CAPSULE, DELAYED RELEASE ORAL DAILY
Qty: 270 CAPSULE | Refills: 2 | Status: SHIPPED | OUTPATIENT
Start: 2025-06-30 | End: 2025-09-28

## 2025-06-30 NOTE — LETTER
July 13, 2025     GUANAKO Tracey  [facility] [address] [address]    Patient: Ton Almaraz   YOB: 1960   Date of Visit: 6/30/2025       Dear GUANAKO Best:    Thank you for referring Ton Almaraz to me for evaluation. Below are my notes for this consultation.  If you have questions, please do not hesitate to call me. I look forward to following your patient along with you.       Sincerely,     Salome BAEZ Below, DO      CC: No Recipients  ______________________________________________________________________________________       [facility]   Referring: No ref. provider found  PCP: GUANAKO Tracey  Date of service: 6/30/25    Chief Complaint   Patient presents with   • Neuropathy     3 month follow up. Seen by [facility] Hematology[facility]On[facility], pt states symptoms have improved some.        HISTORY OF PRESENT ILLNESS     Subjective    Ton Almaraz is a 64 y.o. right handed male who presents for initial evaluation  of numbness/tingling. Past medical history is significant for hypertension, CAD, afib s/p Watchman, s/p R partial knee replacement and s/p bilateral CTS release (about 20 years ago). He presents with significant other Shannan who assists in providing history.   S/p CTS release about 20 years ago bilaterally.     For the last year Ton has been experiencing dysesthesias in his legs (burning/tingling > numbness).  It started in the first and second digits of both feet and have over time spread to include both soles, sometimes radiating up to both ankles.  He denies similar symptoms in his hands, only noticing that digit 2 on his left hand can become very cold requiring him to wear gloves.  He admits to associated weakness in the proximal legs only as well as chronic low back pain. He has autonomic symptoms of constipation, dry mouth, dry eyes and limited sweating. He denies muscle cramping,  "saddle anesthesia, or bowel/bladder involvement.     Prior workup included EMG of his bilateral lower extremities in in May 2024 which was suggestive of both peripheral neuropathy and superimposed bilateral L5-S1 radiculopathy.  He has had an x-ray of his low back that demonstrated degenerative changes throughout without clear compression. He has never had a MRI of his lumbar spine.     For treatment of his symptoms he has tried a chiropractor and physical therapy which have not helped.  He has tried several over-the-counter topicals (bengay, voltaren, vicks) and Epsom salts which do not provide lengthy improvement of his symptoms.  He did not like gabapentin.  He has not tried any other oral medications.    Of note he is following hematology for leukopenia which they are monitoring.    Neuropathy risk factors:  - A1c 4.8 (2023)  - B12 - 1619  - TSH -1.69  - history of alcohol use?  no,   - history of toxin exposure?  none  - history of cancer and chemotherapy exposure? none  - family history of neuropathy? No  - M-spike noted on SPEP    Shx: works as ; denies alcohol, tobacco or recreational drug use      Interval history:  Last visit 3/17/25. NM U/S of BLE did not show evidence of tarsal tunnel syndrome. Duloxetine seems to be only partially helpful. He is no longer getting the \"stingers\" that he used to get but is still getting pain in the bottom of his feet up to his ankle joints, worse in toes 1-2 bilaterally. His back pain has somewhat worsened but it is not radicular in nature. He saw PM&R for consideration of injections but it was felt that his symptoms were a bit atypical for lumbar radiculopathy and instead trial of lyrica was suggested. He did not tolerate this as it made him very sleepy. He also since gone back to taking only 1 tablet of duloxetine a day as he did not notice any difference on increased dose. His wife got him a cream called Mama Bear [employer] which seems to be somewhat helpful. " "He denies autonomic symptoms of dry eyes, dry mouth, lack of sweating.   Of note, he continues to follow with hematology for low white blood count and has been on steroids the last month due to this. He is not exactly sure what the cause of this is but was told by hematology that he \"can live with it.\" Records from outside hematologist are not available to review.     Patient Active Problem List   Diagnosis   • Bilateral carotid artery stenosis   • CAD S/P percutaneous coronary angioplasty   • Chest pain   • Essential hypertension   • History of non-ST elevation myocardial infarction (NSTEMI)   • Hypokalemia   • Lower extremity edema   • Mixed hyperlipidemia   • PAD (peripheral artery disease)   • Persistent atrial fibrillation (Multi)   • Polymyalgia rheumatica (Multi)   • TIA (transient ischemic attack)   • Class 1 obesity due to excess calories with body mass index (BMI) of 30.0 to 30.9 in adult   • Class 1 obesity due to excess calories with body mass index (BMI) of 31.0 to 31.9 in adult   • History of CVA (cerebrovascular accident)   • High risk medication use   • Never smoked any substance   • On dofetilide therapy   • Presence of Watchman left atrial appendage closure device   • Rheumatoid arthritis   • Family history of ischemic heart disease   • Encounter for medication review and counseling   • Encounter to discuss treatment options   • BMI 32.0-32.9,adult   • Benign prostatic hyperplasia with lower urinary tract symptoms   • Other constipation   • Thrombocytopenia   • Hepatic steatosis   • Renal cyst   • Chronic pruritus     Past Medical History:   Diagnosis Date   • Acute serous otitis media, left ear 05/19/2017    Acute serous otitis media, left ear   • Body mass index (BMI) 33.0-33.9, adult 03/15/2022    BMI 33.0-33.9,adult   • Encounter for preprocedural cardiovascular examination 02/22/2022    Preop cardiovascular exam   • Other chest pain 11/04/2021    Chest pain, non-cardiac   • Other specified " counseling 03/15/2022    Encounter for medication counseling   • Other specified symptoms and signs involving the circulatory and respiratory systems 11/04/2021    Bruit of right carotid artery   • Other specified symptoms and signs involving the circulatory and respiratory systems 11/04/2021    Decreased pedal pulses   • Pain in leg, unspecified 11/04/2021    Leg pain   • Person consulting for explanation of examination or test findings 03/15/2022    Encounter to discuss test results   • Persons encountering health services in other specified circumstances 02/22/2022    Encounter to establish care with new doctor     Past Surgical History:   Procedure Laterality Date   • OTHER SURGICAL HISTORY  11/04/2021    Appendectomy   • OTHER SURGICAL HISTORY  11/04/2021    Colonoscopy   • OTHER SURGICAL HISTORY  11/04/2021    Cardioversion   • OTHER SURGICAL HISTORY  11/04/2021    Knee replacement   • OTHER SURGICAL HISTORY  11/17/2021    Cardiac catheterization with stent placement     Social History     Tobacco Use   • Smoking status: Never     Passive exposure: Never   • Smokeless tobacco: Never   Substance Use Topics   • Alcohol use: Not Currently     Comment: very rarely     family history includes Coronary artery disease in an other family member; Lung cancer in his brother; Prostate cancer in his father; Scleroderma in his mother; scleroderma involving lung in his mother.    Current Outpatient Medications   Medication Instructions   • ammonium lactate (Lac-Hydrin) 12 % lotion Topical, As needed   • aspirin 81 mg EC tablet 1 tablet, Daily   • b complex 0.4 mg tablet 1 tablet, Daily   • cetirizine (ZYRTEC) 10 mg, oral, Daily   • dexAMETHasone (Decadron) 4 mg tablet TAKE 3 TABLETS BY MOUTH EVERY MONDAY   • digoxin (LANOXIN) 125 mcg, oral, Daily   • dofetilide (Tikosyn) 250 mcg capsule TAKE 2 CAPSULES BY MOUTH EVERY MORNING, TAKE 1 CAPSULE EVERY EVENING.   • DULoxetine (CYMBALTA) 90 mg, oral, Daily, Do not crush or chew.    • fluticasone (Flonase) 50 mcg/actuation nasal spray 2 sprays, Daily   • hydrOXYzine pamoate (VISTARIL) 25 mg, As needed   • tmjwisrxn-[id number]-[id number]-algal oil 3 mg-35 mg-2 mg -90.314 mg capsule 1 capsule, Daily   • losartan (COZAAR) 100 mg, oral, Daily   • magnesium oxide (Mag-Ox) 400 mg (241.3 mg elemental) tablet Take 1 tablet (400 mg) by mouth 2 times a day.   • nitroglycerin (NITROSTAT) 0.4 mg, sublingual, As needed   • rosuvastatin (CRESTOR) 20 mg, oral, Daily   • tamsulosin (FLOMAX) 0.4 mg, oral, 2 times daily     Allergies   Allergen Reactions   • Diltiazem Unknown   • Gabapentin Drowsiness   • Metoprolol Succinate Unknown   • Oxycodone Unknown   • Pregabalin Drowsiness       PHYSICAL EXAM     Objective  Neurological Exam  Physical Exam    General Appearance:  No distress, alert, interactive and cooperative.   Skin: No rash present on limbs or extensor surfaces    Neurological:  Mental status: the patient provided an accurate history, language was normal.   Cranial Nerves:  CN 2   Visual fields full to confrontation.   CN 3, 4, 6   Lids symmetric; no ptosis.   EOMs normal alignment, full range, with normal pursuit and convergence  No nystagmus.   CN 5   Facial sensation intact bilaterally.   CN 7   Normal and symmetric facial strength. Nasolabial folds symmetric.   Able to lift eyebrows and close eye lids with eye lashes buried symmetrically.   CN 8   Hearing intact to conversation.     CN 9, 10   Palate elevates symmetrically.   Phonation within normal limits, no dysarthria.   CN 11   Normal strength of shoulder shrug and neck turning.   CN 12   Tongue midline, with normal bulk and strength; no fasciculations.     Motor:   Muscle bulk: Normal throughout.  Muscle tone: Normal in both upper and lower extremities.  Movements: No fasciculations, tremors, or other abnormal movement.     Manual Muscle Testing (MMT) reveals the following MRC grades:    R L   Shoulder abduction  5 5  Elbow flexion   5 5  Elbow  extension  5 5  Wrist extension  5 5  Wrist flexion   5 5  Finger extension  5 5  Finger flexion   5 5  Finger abduction  5 5  Thumb flexion   5 5  Thumb abduction   5 5    Hip flexion   5 5  Hip abduction    5 5  Hip adduction    5 5  Knee flexion   5 5  Knee extension  5 5  Ankle dorsiflexion  5 5  Ankle plantarflexion  5 5  Ankle Inversion   5- 5-  Ankle Eversion   5 5  Big toe extension  5 5  Toe flexion   5- 5-    Reflexes:     R          L  BR:               2          2  Biceps:         2          2  Triceps:        2          2  Knee:           1          2  Ankle:          1          1    Babinski: Toes are down going  Caicedo's: Not present  No clonus      Sensory:   In both upper and lower extremities, sensation was intact to light touch  Pinprick: bilateral feet decreased pinprick on both soles and to midfoot on dorsum (worse on right)  Proprioception: intact in BLE  Vibration: mild decrease at bilateral big toes    Coordination:    In both upper extremities, finger-nose-finger was intact without dysmetria or overshoot.   In both lower extremities, heel-to-shin was intact  CHUYITA were intact in both upper and lower extremities.      Gait:   Station was stable with a normal base. Gait was stable with a normal arm swing and speed. No ataxia, shuffling, steppage or waddling was present. No circumduction was present.     RESULTS   Data reviewed:  - MRI Lspine 2/13/25  IMPRESSION:  At L3-L4 there is severe facet arthropathy with ligamentum flavum thickening, trace spondylolisthesis, and mild lumbar spondylosis contributing to severe spinal canal stenosis and lateral recess narrowing. Severe left neural foraminal narrowing with compression of  the exiting left L3 nerve root.  There is severe right neural foraminal narrowing at L4-L5 with compression of the exiting right L4 nerve root.  Moderate spinal canal stenosis at L2-L3.    - Xray 6/15/24  IMPRESSION:  Moderate lumbar degenerative changes, greatest L4-S1  similar to the previous study.  Minimal anterolisthesis L3-4 from facet arthrosis with no pathologic motion.    - EMG 5/21/24  Peripheral neuropathy idiopathic.  Patient could be screened for causes of peripheral neuropathy such as diabetes mellitus, hypothyroidism, exposure to toxins, autoimmune disorder etc.   Simple Rinne imposed bilateral L5-S1 radiculopathy with mild involvement of the right L4 root   Patient being treated  conservatively   Patient had imaging of the lumbar spine done.   If clinically indicated we could repeat the study in a year     - Neuromuscular U/S 5/30/25  This is a normal neuromuscular ultrasound examination of the bilateral tibial nerves and ankles.   There was no ultrasound evidence of a distal tibial neuropathy at the tarsal tunnel on either the left or the right by absolute criteria. The other nearby visualized osseous, ligamentous and tendinous structures of the ankle joint appeared normal.   The adjacent bilateral posterior tibial arteries and veins were also normal.  In addition, there was no ultrasound evidence of plantar fasciitis bilaterally.      Lab Results   Component Value Date    HGBA1C 5.3 03/12/2025    SABXCQNU77 373 10/11/2019    VITAMINB6 40.3 (H) 03/12/2025    VITEALPHA 11.4 03/12/2025    VITEGAMMA 1.1 03/12/2025    SPEP Aberrant band detected. See immunofixation. 03/12/2025    TSH 1.69 03/12/2025    TSH 1.39 02/13/2025     Lab Results   Component Value Date    CKTOTAL 128 09/11/2019     Lab Results   Component Value Date    SPEP Aberrant band detected. See immunofixation. 03/12/2025     Serum 3/2025: kappa/lambda FLC wnl, CRP 3.5, B1 121, B6 40.3, vit E wnl, ANCA neg, JASMEET neg, SSA neg, SSB neg      IMPRESSION AND PLAN   Ton Almaraz is a 63 yo nondiabetic male who presents with 1 year history of gradually progressive dysesthesia in BLE. EMG from 5/2024 was suggestive of mild axonal peripheral neuropathy with superimposed bilateral lower lumbosacral radiculopathies. NM  U/S of BLE was normal without evidence of contributing pathology.   Suspected peripheral neuropathy, axonal. His symptoms seem to be clinically worse than what was demonstrated mild involvement seen on EMG from 5/2024. Unclear etiology but SPEP demonstrated aberrant monoclongal IgG lambda M-band. As his symptoms continue to worsen, will obtain updated EMG to evaluate for progression of neuropathy. A superimposed SFN cannot be fully excluded.   Possible bilateral lumbosacral radiculopathies per EDX findings from 5/2024. MRI Lspine on 2/14/25 demonstrated central spinal canal stenosis with severe neural foraminal narrowing at left L3 and right L4. He has been evaluated by PM&R and neurosurgery who have both recommended conservative management.   Neuropathic pain (tingling/burning > numbness). He does not like gabapentin, lyrica made him sleepy and topicals did not improve his symptoms. He has tolerated low dose duloxetine without side effects. Will continue to optimize with titration goal of up to 90mg qD.   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 Patient will follow up with his hematologist Dr. Bello regarding this. Recent records unable to be viewed. Will request.     Plan:  - increase duloxetine to 60mg qHS. After 2 weeks, increase to 90mg qHS. Side effects reviewed.   - if no improvement, will consider switching duloxetine to OXC  - EMG of left arm, left leg  - patient to continue to follow up with his hematologist Dr. Bello regarding M-spike on SPEP. Will request records to review.   - patient understands and agrees to plan  - RTC 2-3 months      Diagnoses and all orders for this visit:  Idiopathic peripheral neuropathy  -     EMG & nerve conduction; Future  -     Follow Up In Neurology; Future  Neuropathic pain  -     DULoxetine (Cymbalta) 30 mg DR capsule; Take 3 capsules (90 mg) by mouth once daily. Do not crush or chew.      Patient Instructions   For the duloxetine, please increase back to 2  tablets once a day. In 2 weeks, you can increase this to 3 tablets once a day.     To schedule an EMG or EEG at [facility], please call [phone] or [phone], followed by choosing option #3.  Please schedule this on a Wednesday.     We will try to get notes from Dr. Bello in regards to your Mspike    Follow up after EMG, in about 2 months       Salome Pearson, DO        I personally spent 41 minutes on the day of the visit completing the review of the medical record and outside records, obtaining history and performing an appropriate physical exam, patient care, counseling and education, placing orders, independently reviewing results, communicating with the patient/family and other providers, coordinating care and performing appropriate clinical documentation.      Addendum 7/1/2025.  Reviewed hematology notes from Dr. Bello from 5/16/2025. 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 It is suspected that thromocytopenia is from hypersplenism and possible ITP. He was started on decadron for possible ITP.

## 2025-06-30 NOTE — TELEPHONE ENCOUNTER
----- Message from Dionne Boss sent at 6/30/2025  9:13 AM EDT -----  Faxed release to 's office at [phone]. Confirmation received.

## 2025-06-30 NOTE — PROGRESS NOTES
[facility]   Referring: No ref. provider found  PCP: GUANAKO Tracey  Date of service: 6/30/25    Chief Complaint   Patient presents with    Neuropathy     3 month follow up. Seen by [facility], pt states symptoms have improved some.        HISTORY OF PRESENT ILLNESS     Subjective     Ton Almaraz is a 64 y.o. right handed male who presents for initial evaluation  of numbness/tingling. Past medical history is significant for hypertension, CAD, afib s/p Watchman, s/p R partial knee replacement and s/p bilateral CTS release (about 20 years ago). He presents with significant other Shannan who assists in providing history.   S/p CTS release about 20 years ago bilaterally.     For the last year Ton has been experiencing dysesthesias in his legs (burning/tingling > numbness).  It started in the first and second digits of both feet and have over time spread to include both soles, sometimes radiating up to both ankles.  He denies similar symptoms in his hands, only noticing that digit 2 on his left hand can become very cold requiring him to wear gloves.  He admits to associated weakness in the proximal legs only as well as chronic low back pain. He has autonomic symptoms of constipation, dry mouth, dry eyes and limited sweating. He denies muscle cramping, saddle anesthesia, or bowel/bladder involvement.     Prior workup included EMG of his bilateral lower extremities in in May 2024 which was suggestive of both peripheral neuropathy and superimposed bilateral L5-S1 radiculopathy.  He has had an x-ray of his low back that demonstrated degenerative changes throughout without clear compression. He has never had a MRI of his lumbar spine.     For treatment of his symptoms he has tried a chiropractor and physical therapy which have not helped.  He has tried several over-the-counter topicals (bengay, voltaren, vicks) and Epsom salts which do not provide lengthy improvement of  "his symptoms.  He did not like gabapentin.  He has not tried any other oral medications.    Of note he is following hematology for leukopenia which they are monitoring.    Neuropathy risk factors:  - A1c 4.8 (2023)  - B12 - 1619  - TSH -1.69  - history of alcohol use?  no,   - history of toxin exposure?  none  - history of cancer and chemotherapy exposure? none  - family history of neuropathy? No  - M-spike noted on SPEP    Shx: works as ; denies alcohol, tobacco or recreational drug use      Interval history:  Last visit 3/17/25. NM U/S of BLE did not show evidence of tarsal tunnel syndrome. Duloxetine seems to be only partially helpful. He is no longer getting the \"stingers\" that he used to get but is still getting pain in the bottom of his feet up to his ankle joints, worse in toes 1-2 bilaterally. His back pain has somewhat worsened but it is not radicular in nature. He saw PM&R for consideration of injections but it was felt that his symptoms were a bit atypical for lumbar radiculopathy and instead trial of lyrica was suggested. He did not tolerate this as it made him very sleepy. He also since gone back to taking only 1 tablet of duloxetine a day as he did not notice any difference on increased dose. His wife got him a cream called [employer] [place] which seems to be somewhat helpful. He denies autonomic symptoms of dry eyes, dry mouth, lack of sweating.   Of note, he continues to follow with hematology for low white blood count and has been on steroids the last month due to this. He is not exactly sure what the cause of this is but was told by hematology that he \"can live with it.\" Records from outside hematologist are not available to review.     Patient Active Problem List   Diagnosis    Bilateral carotid artery stenosis    CAD S/P percutaneous coronary angioplasty    Chest pain    Essential hypertension    History of non-ST elevation myocardial infarction (NSTEMI)    Hypokalemia    Lower " extremity edema    Mixed hyperlipidemia    PAD (peripheral artery disease)    Persistent atrial fibrillation (Multi)    Polymyalgia rheumatica (Multi)    TIA (transient ischemic attack)    Class 1 obesity due to excess calories with body mass index (BMI) of 30.0 to 30.9 in adult    Class 1 obesity due to excess calories with body mass index (BMI) of 31.0 to 31.9 in adult    History of CVA (cerebrovascular accident)    High risk medication use    Never smoked any substance    On dofetilide therapy    Presence of Watchman left atrial appendage closure device    Rheumatoid arthritis    Family history of ischemic heart disease    Encounter for medication review and counseling    Encounter to discuss treatment options    BMI 32.0-32.9,adult    Benign prostatic hyperplasia with lower urinary tract symptoms    Other constipation    Thrombocytopenia    Hepatic steatosis    Renal cyst    Chronic pruritus     Past Medical History:   Diagnosis Date    Acute serous otitis media, left ear 05/19/2017    Acute serous otitis media, left ear    Body mass index (BMI) 33.0-33.9, adult 03/15/2022    BMI 33.0-33.9,adult    Encounter for preprocedural cardiovascular examination 02/22/2022    Preop cardiovascular exam    Other chest pain 11/04/2021    Chest pain, non-cardiac    Other specified counseling 03/15/2022    Encounter for medication counseling    Other specified symptoms and signs involving the circulatory and respiratory systems 11/04/2021    Bruit of right carotid artery    Other specified symptoms and signs involving the circulatory and respiratory systems 11/04/2021    Decreased pedal pulses    Pain in leg, unspecified 11/04/2021    Leg pain    Person consulting for explanation of examination or test findings 03/15/2022    Encounter to discuss test results    Persons encountering health services in other specified circumstances 02/22/2022    Encounter to establish care with new doctor     Past Surgical History:   Procedure  Laterality Date    OTHER SURGICAL HISTORY  11/04/2021    Appendectomy    OTHER SURGICAL HISTORY  11/04/2021    Colonoscopy    OTHER SURGICAL HISTORY  11/04/2021    Cardioversion    OTHER SURGICAL HISTORY  11/04/2021    Knee replacement    OTHER SURGICAL HISTORY  11/17/2021    Cardiac catheterization with stent placement     Social History     Tobacco Use    Smoking status: Never     Passive exposure: Never    Smokeless tobacco: Never   Substance Use Topics    Alcohol use: Not Currently     Comment: very rarely     family history includes Coronary artery disease in an other family member; Lung cancer in his brother; Prostate cancer in his father; Scleroderma in his mother; scleroderma involving lung in his mother.    Current Outpatient Medications   Medication Instructions    ammonium lactate (Lac-Hydrin) 12 % lotion Topical, As needed    aspirin 81 mg EC tablet 1 tablet, Daily    b complex 0.4 mg tablet 1 tablet, Daily    cetirizine (ZYRTEC) 10 mg, oral, Daily    dexAMETHasone (Decadron) 4 mg tablet TAKE 3 TABLETS BY MOUTH EVERY MONDAY    digoxin (LANOXIN) 125 mcg, oral, Daily    dofetilide (Tikosyn) 250 mcg capsule TAKE 2 CAPSULES BY MOUTH EVERY MORNING, TAKE 1 CAPSULE EVERY EVENING.    DULoxetine (CYMBALTA) 90 mg, oral, Daily, Do not crush or chew.    fluticasone (Flonase) 50 mcg/actuation nasal spray 2 sprays, Daily    hydrOXYzine pamoate (VISTARIL) 25 mg, As needed    ahhprokyy-Z1-duE75-algal oil 3 mg-35 mg-2 mg -90.314 mg capsule 1 capsule, Daily    losartan (COZAAR) 100 mg, oral, Daily    magnesium oxide (Mag-Ox) 400 mg (241.3 mg elemental) tablet Take 1 tablet (400 mg) by mouth 2 times a day.    nitroglycerin (NITROSTAT) 0.4 mg, sublingual, As needed    rosuvastatin (CRESTOR) 20 mg, oral, Daily    tamsulosin (FLOMAX) 0.4 mg, oral, 2 times daily     Allergies   Allergen Reactions    Diltiazem Unknown    Gabapentin Drowsiness    Metoprolol Succinate Unknown    Oxycodone Unknown    Pregabalin Drowsiness        PHYSICAL EXAM     Objective   Neurological Exam  Physical Exam    General Appearance:  No distress, alert, interactive and cooperative.   Skin: No rash present on limbs or extensor surfaces    Neurological:  Mental status: the patient provided an accurate history, language was normal.   Cranial Nerves:  CN 2   Visual fields full to confrontation.   CN 3, 4, 6   Lids symmetric; no ptosis.   EOMs normal alignment, full range, with normal pursuit and convergence  No nystagmus.   CN 5   Facial sensation intact bilaterally.   CN 7   Normal and symmetric facial strength. Nasolabial folds symmetric.   Able to lift eyebrows and close eye lids with eye lashes buried symmetrically.   CN 8   Hearing intact to conversation.     CN 9, 10   Palate elevates symmetrically.   Phonation within normal limits, no dysarthria.   CN 11   Normal strength of shoulder shrug and neck turning.   CN 12   Tongue midline, with normal bulk and strength; no fasciculations.     Motor:   Muscle bulk: Normal throughout.  Muscle tone: Normal in both upper and lower extremities.  Movements: No fasciculations, tremors, or other abnormal movement.     Manual Muscle Testing (MMT) reveals the following MRC grades:    R L   Shoulder abduction  5 5  Elbow flexion   5 5  Elbow extension  5 5  Wrist extension  5 5  Wrist flexion   5 5  Finger extension  5 5  Finger flexion   5 5  Finger abduction  5 5  Thumb flexion   5 5  Thumb abduction   5 5    Hip flexion   5 5  Hip abduction    5 5  Hip adduction    5 5  Knee flexion   5 5  Knee extension  5 5  Ankle dorsiflexion  5 5  Ankle plantarflexion  5 5  Ankle Inversion   5- 5-  Ankle Eversion   5 5  Big toe extension  5 5  Toe flexion   5- 5-    Reflexes:     R          L  BR:               2          2  Biceps:         2          2  Triceps:        2          2  Knee:           1          2  Ankle:          1          1    Babinski: Toes are down going  Caicedo's: Not present  No clonus      Sensory:   In  both upper and lower extremities, sensation was intact to light touch  Pinprick: bilateral feet decreased pinprick on both soles and to midfoot on dorsum (worse on right)  Proprioception: intact in BLE  Vibration: mild decrease at bilateral big toes    Coordination:    In both upper extremities, finger-nose-finger was intact without dysmetria or overshoot.   In both lower extremities, heel-to-shin was intact  CHUYITA were intact in both upper and lower extremities.      Gait:   Station was stable with a normal base. Gait was stable with a normal arm swing and speed. No ataxia, shuffling, steppage or waddling was present. No circumduction was present.     RESULTS   Data reviewed:  - MRI Lspine 2/13/25  IMPRESSION:  At L3-L4 there is severe facet arthropathy with ligamentum flavum thickening, trace spondylolisthesis, and mild lumbar spondylosis contributing to severe spinal canal stenosis and lateral recess narrowing. Severe left neural foraminal narrowing with compression of  the exiting left L3 nerve root.  There is severe right neural foraminal narrowing at L4-L5 with compression of the exiting right L4 nerve root.  Moderate spinal canal stenosis at L2-L3.    - Xray 6/15/24  IMPRESSION:  Moderate lumbar degenerative changes, greatest L4-S1 similar to the previous study.  Minimal anterolisthesis L3-4 from facet arthrosis with no pathologic motion.    - EMG 5/21/24  Peripheral neuropathy idiopathic.  Patient could be screened for causes of peripheral neuropathy such as diabetes mellitus, hypothyroidism, exposure to toxins, autoimmune disorder etc.   Simple Rinne imposed bilateral L5-S1 radiculopathy with mild involvement of the right L4 root   Patient being treated  conservatively   Patient had imaging of the lumbar spine done.   If clinically indicated we could repeat the study in a year     - Neuromuscular U/S 5/30/25  This is a normal neuromuscular ultrasound examination of the bilateral tibial nerves and ankles.    There was no ultrasound evidence of a distal tibial neuropathy at the tarsal tunnel on either the left or the right by absolute criteria. The other nearby visualized osseous, ligamentous and tendinous structures of the ankle joint appeared normal.   The adjacent bilateral posterior tibial arteries and veins were also normal.  In addition, there was no ultrasound evidence of plantar fasciitis bilaterally.      Lab Results   Component Value Date    HGBA1C 5.3 03/12/2025    QKUMYXDP65 373 10/11/2019    VITAMINB6 40.3 (H) 03/12/2025    VITEALPHA 11.4 03/12/2025    VITEGAMMA 1.1 03/12/2025    SPEP Aberrant band detected. See immunofixation. 03/12/2025    TSH 1.69 03/12/2025    TSH 1.39 02/13/2025     Lab Results   Component Value Date    CKTOTAL 128 09/11/2019     Lab Results   Component Value Date    SPEP Aberrant band detected. See immunofixation. 03/12/2025     Serum 3/2025: kappa/lambda FLC wnl, CRP 3.5, B1 121, B6 40.3, vit E wnl, ANCA neg, JASMEET neg, SSA neg, SSB neg      IMPRESSION AND PLAN   Ton Almaraz is a 63 yo nondiabetic male who presents with 1 year history of gradually progressive dysesthesia in BLE. EMG from 5/2024 was suggestive of mild axonal peripheral neuropathy with superimposed bilateral lower lumbosacral radiculopathies. NM U/S of BLE was normal without evidence of contributing pathology.   Suspected peripheral neuropathy, axonal. His symptoms seem to be clinically worse than what was demonstrated mild involvement seen on EMG from 5/2024. Unclear etiology but SPEP demonstrated aberrant monoclongal IgG lambda M-band. As his symptoms continue to worsen, will obtain updated EMG to evaluate for progression of neuropathy. A superimposed SFN cannot be fully excluded.   Possible bilateral lumbosacral radiculopathies per EDX findings from 5/2024. MRI Lspine on 2/14/25 demonstrated central spinal canal stenosis with severe neural foraminal narrowing at left L3 and right L4. He has been evaluated by PM&R and  neurosurgery who have both recommended conservative management.   Neuropathic pain (tingling/burning > numbness). He does not like gabapentin, lyrica made him sleepy and topicals did not improve his symptoms. He has tolerated low dose duloxetine without side effects. Will continue to optimize with titration goal of up to 90mg qD.   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 Patient will follow up with his hematologist Dr. Bello regarding this. Recent records unable to be viewed. Will request.     Plan:  - increase duloxetine to 60mg qHS. After 2 weeks, increase to 90mg qHS. Side effects reviewed.   - if no improvement, will consider switching duloxetine to OXC  - EMG of left arm, left leg  - patient to continue to follow up with his hematologist Dr. Bello regarding M-spike on SPEP. Will request records to review.   - patient understands and agrees to plan  - RTC 2-3 months      Diagnoses and all orders for this visit:  Idiopathic peripheral neuropathy  -     EMG & nerve conduction; Future  -     Follow Up In Neurology; Future  Neuropathic pain  -     DULoxetine (Cymbalta) 30 mg DR capsule; Take 3 capsules (90 mg) by mouth once daily. Do not crush or chew.      Patient Instructions   For the duloxetine, please increase back to 2 tablets once a day. In 2 weeks, you can increase this to 3 tablets once a day.     To schedule an EMG or EEG at [facility], please call [phone] or [phone], followed by choosing option #3.  Please schedule this on a Wednesday.     We will try to get notes from Dr. Bello in regards to your Mspike    Follow up after EMG, in about 2 months       Salome BAEZ Below, DO        I personally spent 41 minutes on the day of the visit completing the review of the medical record and outside records, obtaining history and performing an appropriate physical exam, patient care, counseling and education, placing orders, independently reviewing results, communicating with the  patient/family and other providers, coordinating care and performing appropriate clinical documentation.      Addendum 7/1/2025.  Reviewed hematology notes from Dr. Bello from 5/16/2025. 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 It is suspected that thromocytopenia is from hypersplenism and possible ITP. He was started on decadron for possible ITP.

## 2025-06-30 NOTE — PATIENT INSTRUCTIONS
For the duloxetine, please increase back to 2 tablets once a day. In 2 weeks, you can increase this to 3 tablets once a day.     To schedule an EMG or EEG at [facility], please call [phone] or [phone], followed by choosing option #3.  Please schedule this on a Wednesday.     We will try to get notes from Dr. Bello in regards to your Mspike    Follow up after EMG, in about 2 months

## 2025-07-30 ENCOUNTER — HOSPITAL ENCOUNTER (OUTPATIENT)
Dept: NEUROLOGY | Facility: HOSPITAL | Age: 65
Discharge: HOME | End: 2025-07-30
Payer: COMMERCIAL

## 2025-07-30 DIAGNOSIS — G60.9 IDIOPATHIC PERIPHERAL NEUROPATHY: ICD-10-CM

## 2025-07-30 PROCEDURE — 95913 NRV CNDJ TEST 13/> STUDIES: CPT | Performed by: STUDENT IN AN ORGANIZED HEALTH CARE EDUCATION/TRAINING PROGRAM

## 2025-07-30 PROCEDURE — 95885 MUSC TST DONE W/NERV TST LIM: CPT | Mod: RT | Performed by: STUDENT IN AN ORGANIZED HEALTH CARE EDUCATION/TRAINING PROGRAM

## 2025-07-30 PROCEDURE — 95886 MUSC TEST DONE W/N TEST COMP: CPT | Performed by: STUDENT IN AN ORGANIZED HEALTH CARE EDUCATION/TRAINING PROGRAM

## 2025-07-30 PROCEDURE — 95885 MUSC TST DONE W/NERV TST LIM: CPT | Performed by: STUDENT IN AN ORGANIZED HEALTH CARE EDUCATION/TRAINING PROGRAM

## 2025-10-03 ENCOUNTER — APPOINTMENT (OUTPATIENT)
Dept: UROLOGY | Facility: CLINIC | Age: 65
End: 2025-10-03
Payer: COMMERCIAL

## 2025-10-06 ENCOUNTER — APPOINTMENT (OUTPATIENT)
Dept: NEUROLOGY | Facility: CLINIC | Age: 65
End: 2025-10-06
Payer: COMMERCIAL

## 2025-11-07 ENCOUNTER — APPOINTMENT (OUTPATIENT)
Dept: CARDIOLOGY | Facility: CLINIC | Age: 65
End: 2025-11-07
Payer: COMMERCIAL

## 2026-01-07 ENCOUNTER — APPOINTMENT (OUTPATIENT)
Dept: CARDIOLOGY | Facility: CLINIC | Age: 66
End: 2026-01-07
Payer: COMMERCIAL

## (undated) DEVICE — GAUZE,SPONGE,FLUFF,6"X6.75",STRL,10/TRAY: Brand: MEDLINE

## (undated) DEVICE — COVER LT HNDL BLU PLAS

## (undated) DEVICE — JELLY,LUBE,STERILE,FLIP TOP,TUBE,2-OZ: Brand: MEDLINE

## (undated) DEVICE — GOWN,AURORA,NONRNF,XL,30/CS: Brand: MEDLINE

## (undated) DEVICE — ENDO CARRY-ON PROCEDURE KIT INCLUDES LUBRICANT, DEFENDO OLYMPUS AIR, WATER, SUCTION, BIOPSY VALVE KIT, ENZYMATIC SPONGE, AND BASIN.: Brand: ENDO CARRY-ON PROCEDURE KIT

## (undated) DEVICE — SKIN PREP TRAY 4 COMPARTM TRAY: Brand: MEDLINE INDUSTRIES, INC.

## (undated) DEVICE — COUNTER NDL 40 COUNT HLD 70 FOAM BLK ADH W/ MAG

## (undated) DEVICE — SPONGE,LAP,18"X18",DLX,XR,ST,5/PK,40/PK: Brand: MEDLINE

## (undated) DEVICE — FORCEPS BX L240CM JAW DIA2.8MM L CAP W/ NDL MIC MESH TOOTH

## (undated) DEVICE — NEPTUNE E-SEP SMOKE EVACUATION PENCIL, COATED, 70MM BLADE, PUSH BUTTON SWITCH: Brand: NEPTUNE E-SEP

## (undated) DEVICE — TUBE SET 96 MM 64 MM H2O PERISTALTIC STD AUX CHANNEL

## (undated) DEVICE — GOWN,AURORA,NONREINFORCED,LARGE: Brand: MEDLINE

## (undated) DEVICE — SPONGE GZ W4XL4IN RAYON POLY CVR W/NONWOVEN FAB STRL 2/PK

## (undated) DEVICE — TOWEL,OR,DSP,ST,BLUE,STD,4/PK,20PK/CS: Brand: MEDLINE

## (undated) DEVICE — BRUSH ENDO CLN L90.5IN SHTH DIA1.7MM BRIST DIA5-7MM 2-6MM

## (undated) DEVICE — SYRINGE MED 10ML LUERLOCK TIP W/O SFTY DISP

## (undated) DEVICE — LABEL MED MINI W/ MARKER

## (undated) DEVICE — DEVICE SEAL L23CM NANO COAT MARYLAND JAW OPN DIV LIGASURE

## (undated) DEVICE — GLOVE ORANGE PI 8   MSG9080

## (undated) DEVICE — GLOVE ORANGE PI 7 1/2   MSG9075

## (undated) DEVICE — SINGLE PORT MANIFOLD: Brand: NEPTUNE 2

## (undated) DEVICE — SYRINGE IRRIG 60ML SFT PLIABLE BLB EZ TO GRP 1 HND USE W/

## (undated) DEVICE — Device: Brand: ENDO SMARTCAP

## (undated) DEVICE — HYPODERMIC SAFETY NEEDLE: Brand: MAGELLAN

## (undated) DEVICE — BRUSH ENDO COMBO

## (undated) DEVICE — YANKAUER,SMOOTH HANDLE,HIGH CAPACITY: Brand: MEDLINE INDUSTRIES, INC.

## (undated) DEVICE — ELECTRODE PT RET AD L9FT HI MOIST COND ADH HYDRGEL CORDED

## (undated) DEVICE — TUBING, SUCTION, 1/4" X 10', STRAIGHT: Brand: MEDLINE

## (undated) DEVICE — ADAPTER FLSH PMP FLD MGMT GI IRRIG OFP 2 DISPOSABLE

## (undated) DEVICE — TUBING, SUCTION, 9/32" X 12', STRAIGHT: Brand: MEDLINE INDUSTRIES, INC.

## (undated) DEVICE — ENDO CARRY-ON PROCEDURE KIT: Brand: ENDO CARRY-ON PROCEDURE KIT